# Patient Record
Sex: MALE | Race: WHITE | Employment: OTHER | URBAN - METROPOLITAN AREA
[De-identification: names, ages, dates, MRNs, and addresses within clinical notes are randomized per-mention and may not be internally consistent; named-entity substitution may affect disease eponyms.]

---

## 2017-06-14 ENCOUNTER — TELEPHONE (OUTPATIENT)
Dept: CARDIOLOGY CLINIC | Age: 67
End: 2017-06-14

## 2017-07-05 ENCOUNTER — PROCEDURE VISIT (OUTPATIENT)
Dept: CARDIOLOGY CLINIC | Age: 67
End: 2017-07-05

## 2017-07-05 VITALS — DIASTOLIC BLOOD PRESSURE: 86 MMHG | HEART RATE: 64 BPM | SYSTOLIC BLOOD PRESSURE: 138 MMHG

## 2017-07-05 DIAGNOSIS — Z95.810 ICD (IMPLANTABLE CARDIOVERTER-DEFIBRILLATOR), BIVENTRICULAR, IN SITU: Primary | ICD-10-CM

## 2017-07-05 PROCEDURE — 93284 PRGRMG EVAL IMPLANTABLE DFB: CPT | Performed by: INTERNAL MEDICINE

## 2017-07-05 PROCEDURE — 93290 INTERROG DEV EVAL ICPMS IP: CPT | Performed by: INTERNAL MEDICINE

## 2017-07-06 ENCOUNTER — TELEPHONE (OUTPATIENT)
Dept: CARDIOLOGY CLINIC | Age: 67
End: 2017-07-06

## 2017-07-07 ENCOUNTER — TELEPHONE (OUTPATIENT)
Dept: CARDIOLOGY CLINIC | Age: 67
End: 2017-07-07

## 2017-08-07 DIAGNOSIS — I48.20 CHRONIC ATRIAL FIBRILLATION (HCC): ICD-10-CM

## 2017-08-07 DIAGNOSIS — I48.91 LONE ATRIAL FIBRILLATION (HCC): ICD-10-CM

## 2017-08-07 DIAGNOSIS — I10 HTN (HYPERTENSION), BENIGN: ICD-10-CM

## 2017-08-07 RX ORDER — AMIODARONE HYDROCHLORIDE 200 MG/1
200 TABLET ORAL DAILY
Qty: 30 TABLET | Refills: 0 | Status: SHIPPED | OUTPATIENT
Start: 2017-08-07 | End: 2017-09-06 | Stop reason: SDUPTHER

## 2017-08-08 DIAGNOSIS — I48.91 LONE ATRIAL FIBRILLATION (HCC): ICD-10-CM

## 2017-08-08 DIAGNOSIS — I48.20 CHRONIC ATRIAL FIBRILLATION (HCC): ICD-10-CM

## 2017-08-08 DIAGNOSIS — I10 HTN (HYPERTENSION), BENIGN: ICD-10-CM

## 2017-08-08 RX ORDER — HYDRALAZINE HYDROCHLORIDE 50 MG/1
TABLET, FILM COATED ORAL
Qty: 90 TABLET | Refills: 0 | Status: SHIPPED | OUTPATIENT
Start: 2017-08-08 | End: 2017-11-01 | Stop reason: SDUPTHER

## 2017-08-10 DIAGNOSIS — I48.20 CHRONIC ATRIAL FIBRILLATION (HCC): ICD-10-CM

## 2017-08-10 DIAGNOSIS — I48.91 LONE ATRIAL FIBRILLATION (HCC): ICD-10-CM

## 2017-08-10 DIAGNOSIS — I10 HTN (HYPERTENSION), BENIGN: ICD-10-CM

## 2017-08-10 RX ORDER — AMIODARONE HYDROCHLORIDE 200 MG/1
200 TABLET ORAL DAILY
Qty: 30 TABLET | Refills: 0 | OUTPATIENT
Start: 2017-08-10

## 2017-08-16 ENCOUNTER — OFFICE VISIT (OUTPATIENT)
Dept: CARDIOLOGY CLINIC | Age: 67
End: 2017-08-16

## 2017-08-16 ENCOUNTER — TELEPHONE (OUTPATIENT)
Dept: CARDIOLOGY CLINIC | Age: 67
End: 2017-08-16

## 2017-08-16 VITALS
SYSTOLIC BLOOD PRESSURE: 138 MMHG | DIASTOLIC BLOOD PRESSURE: 88 MMHG | WEIGHT: 315 LBS | HEART RATE: 71 BPM | BODY MASS INDEX: 45.1 KG/M2 | HEIGHT: 70 IN

## 2017-08-16 DIAGNOSIS — I48.91 LONE ATRIAL FIBRILLATION (HCC): Primary | ICD-10-CM

## 2017-08-16 PROCEDURE — 93000 ELECTROCARDIOGRAM COMPLETE: CPT | Performed by: INTERNAL MEDICINE

## 2017-08-16 PROCEDURE — 99213 OFFICE O/P EST LOW 20 MIN: CPT | Performed by: INTERNAL MEDICINE

## 2017-08-16 RX ORDER — TRIAMTERENE AND HYDROCHLOROTHIAZIDE 37.5; 25 MG/1; MG/1
1 TABLET ORAL EVERY MORNING
COMMUNITY
End: 2020-02-04

## 2017-09-06 DIAGNOSIS — I48.20 CHRONIC ATRIAL FIBRILLATION (HCC): ICD-10-CM

## 2017-09-06 DIAGNOSIS — I48.91 LONE ATRIAL FIBRILLATION (HCC): ICD-10-CM

## 2017-09-06 DIAGNOSIS — I10 HTN (HYPERTENSION), BENIGN: ICD-10-CM

## 2017-09-07 RX ORDER — AMIODARONE HYDROCHLORIDE 200 MG/1
200 TABLET ORAL DAILY
Qty: 30 TABLET | Refills: 6 | Status: SHIPPED | OUTPATIENT
Start: 2017-09-07 | End: 2017-11-03 | Stop reason: ALTCHOICE

## 2017-09-15 ENCOUNTER — TELEPHONE (OUTPATIENT)
Dept: SURGERY | Age: 67
End: 2017-09-15

## 2017-09-25 ENCOUNTER — TELEPHONE (OUTPATIENT)
Dept: NON INVASIVE DIAGNOSTICS | Age: 67
End: 2017-09-25

## 2017-09-26 ENCOUNTER — PROCEDURE VISIT (OUTPATIENT)
Dept: CARDIOLOGY CLINIC | Age: 67
End: 2017-09-26

## 2017-09-26 VITALS — DIASTOLIC BLOOD PRESSURE: 84 MMHG | HEART RATE: 64 BPM | SYSTOLIC BLOOD PRESSURE: 132 MMHG

## 2017-09-26 DIAGNOSIS — Z95.810 ICD (IMPLANTABLE CARDIOVERTER-DEFIBRILLATOR), BIVENTRICULAR, IN SITU: Primary | ICD-10-CM

## 2017-09-26 PROCEDURE — 93284 PRGRMG EVAL IMPLANTABLE DFB: CPT | Performed by: INTERNAL MEDICINE

## 2017-09-28 ENCOUNTER — OFFICE VISIT (OUTPATIENT)
Dept: SURGERY | Age: 67
End: 2017-09-28

## 2017-09-28 VITALS
HEIGHT: 72 IN | HEART RATE: 81 BPM | SYSTOLIC BLOOD PRESSURE: 139 MMHG | BODY MASS INDEX: 42.66 KG/M2 | DIASTOLIC BLOOD PRESSURE: 77 MMHG | WEIGHT: 315 LBS

## 2017-09-28 DIAGNOSIS — K40.90 INGUINAL HERNIA OF RIGHT SIDE WITHOUT OBSTRUCTION OR GANGRENE: Primary | ICD-10-CM

## 2017-09-28 PROCEDURE — 99204 OFFICE O/P NEW MOD 45 MIN: CPT | Performed by: SURGERY

## 2017-09-28 ASSESSMENT — ENCOUNTER SYMPTOMS
COLOR CHANGE: 0
SORE THROAT: 0
RECTAL PAIN: 0
BACK PAIN: 0
CONSTIPATION: 0
EYE REDNESS: 0
EYE ITCHING: 0
ANAL BLEEDING: 0
PHOTOPHOBIA: 0
CHOKING: 0
STRIDOR: 0
APNEA: 0
ABDOMINAL PAIN: 1

## 2017-10-06 ENCOUNTER — TELEPHONE (OUTPATIENT)
Dept: SURGERY | Age: 67
End: 2017-10-06

## 2017-10-11 ENCOUNTER — HOSPITAL ENCOUNTER (OUTPATIENT)
Dept: CT IMAGING | Age: 67
Discharge: HOME | End: 2017-10-11
Attending: SURGERY | Admitting: SURGERY

## 2017-10-11 DIAGNOSIS — K40.90 UNILATERAL INGUINAL HERNIA WITHOUT OBSTRUCTION OR GANGRENE, RECURRENCE NOT SPECIFIED: ICD-10-CM

## 2017-10-11 DIAGNOSIS — K40.90 UNILATERAL INGUINAL HERNIA WITHOUT OBSTRUCTION OR GANGRENE: ICD-10-CM

## 2017-10-12 ENCOUNTER — OFFICE VISIT (OUTPATIENT)
Dept: SURGERY | Age: 67
End: 2017-10-12

## 2017-10-12 VITALS
HEART RATE: 71 BPM | BODY MASS INDEX: 42.66 KG/M2 | SYSTOLIC BLOOD PRESSURE: 138 MMHG | DIASTOLIC BLOOD PRESSURE: 70 MMHG | HEIGHT: 72 IN | WEIGHT: 315 LBS

## 2017-10-12 DIAGNOSIS — K40.90 INGUINAL HERNIA OF RIGHT SIDE WITHOUT OBSTRUCTION OR GANGRENE: Primary | ICD-10-CM

## 2017-10-12 PROCEDURE — 99214 OFFICE O/P EST MOD 30 MIN: CPT | Performed by: SURGERY

## 2017-10-12 ASSESSMENT — ENCOUNTER SYMPTOMS
ANAL BLEEDING: 0
EYE REDNESS: 0
EYE ITCHING: 0
BACK PAIN: 0
SORE THROAT: 0
CHOKING: 0
STRIDOR: 0
PHOTOPHOBIA: 0
RECTAL PAIN: 0
APNEA: 0
COLOR CHANGE: 0
CONSTIPATION: 0

## 2017-10-13 NOTE — PROGRESS NOTES
no rebound and no guarding. A hernia is present. Hernia confirmed positive in the right inguinal area. Musculoskeletal: Normal range of motion. Neurological: He is alert and oriented to person, place, and time. Skin: Skin is warm. Psychiatric: He has a normal mood and affect. ASSESSMENT:  1. Inguinal hernia of right side without obstruction or gangrene          PLAN:  Treatment:  I will proceed with robotic-assisted right inguinal hernia repair. Patient acknowledges the placement of mesh in order to reduce recurrence. .  Patient counseled on risks, benefits, and alternatives of treatment plan at length. Patient states an understanding and willingness to proceed with plan. No orders of the defined types were placed in this encounter. No orders of the defined types were placed in this encounter. Follow Up:  Return in about 2 weeks (around 10/26/2017).       Gaby Good MD

## 2017-10-16 ENCOUNTER — PROCEDURE VISIT (OUTPATIENT)
Dept: CARDIOLOGY CLINIC | Age: 67
End: 2017-10-16

## 2017-10-16 ENCOUNTER — HOSPITAL ENCOUNTER (OUTPATIENT)
Dept: PULMONOLOGY | Age: 67
Discharge: HOME | End: 2017-10-16
Attending: INTERNAL MEDICINE | Admitting: INTERNAL MEDICINE

## 2017-10-16 DIAGNOSIS — Z92.29 PERSONAL HISTORY OF OTHER DRUG THERAPY (CODE): ICD-10-CM

## 2017-10-16 DIAGNOSIS — Z92.29 PERSONAL HISTORY OF OTHER DRUG THERAPY: ICD-10-CM

## 2017-10-16 DIAGNOSIS — Z95.810 ICD (IMPLANTABLE CARDIOVERTER-DEFIBRILLATOR), BIVENTRICULAR, IN SITU: Primary | ICD-10-CM

## 2017-10-16 PROCEDURE — 93297 REM INTERROG DEV EVAL ICPMS: CPT | Performed by: INTERNAL MEDICINE

## 2017-10-16 PROCEDURE — 93296 REM INTERROG EVL PM/IDS: CPT | Performed by: INTERNAL MEDICINE

## 2017-10-16 PROCEDURE — 93295 DEV INTERROG REMOTE 1/2/MLT: CPT | Performed by: INTERNAL MEDICINE

## 2017-10-17 ENCOUNTER — TELEPHONE (OUTPATIENT)
Dept: SURGERY | Age: 67
End: 2017-10-17

## 2017-10-17 NOTE — TELEPHONE ENCOUNTER
Spoke to Penelope regarding his surgery (robotic right inguinal hernia repair) scheduled @ [facility]. Notified of dates, times and location. PAT - 11/6/17 @ 9  SURGERY - 11/10/17 @ 10  P/O - 11/20/17 @ 900    NPO AFTER MIDNIGHT  HOLD ELIQUIS 5 DAYS PRIOR (FAXED OVER CC REQUEST TO DR. Federico Joya)  OKAY TO CONT.  81MG ASA   SENT

## 2017-10-25 ENCOUNTER — TELEPHONE (OUTPATIENT)
Dept: CARDIOLOGY CLINIC | Age: 67
End: 2017-10-25

## 2017-10-25 NOTE — TELEPHONE ENCOUNTER
Cardiac Risk assessment letter faxed to Dr. Lilian Lizama for (R) Inguinal Hernia, Robotic (L) Inguinal Hernia Repair surgery.   [phone]

## 2017-10-26 ENCOUNTER — TELEPHONE (OUTPATIENT)
Dept: CARDIOLOGY CLINIC | Age: 67
End: 2017-10-26

## 2017-10-26 DIAGNOSIS — R94.2 ABNORMAL PFTS: Primary | ICD-10-CM

## 2017-10-26 NOTE — TELEPHONE ENCOUNTER
Patient advised of abnormal PFT and that Dr. Candace Stanton wants him to discontinue Amiodarone. Per Dr. Doron Burgess, patient should follow up with Pulmonologist.  Patient advised and voiced understanding. Referral in [facility] and sent to Dr. Lovely Alonzo.

## 2017-11-01 DIAGNOSIS — I48.91 LONE ATRIAL FIBRILLATION (HCC): ICD-10-CM

## 2017-11-01 DIAGNOSIS — I10 HTN (HYPERTENSION), BENIGN: ICD-10-CM

## 2017-11-01 DIAGNOSIS — I48.20 CHRONIC ATRIAL FIBRILLATION (HCC): ICD-10-CM

## 2017-11-01 NOTE — TELEPHONE ENCOUNTER
Patient called needs 90 day supply sent to [address] [place] [address] & [address] [facility]. Advised 24 hours.

## 2017-11-02 RX ORDER — HYDRALAZINE HYDROCHLORIDE 50 MG/1
50 TABLET, FILM COATED ORAL 3 TIMES DAILY
Qty: 90 TABLET | Refills: 3 | Status: SHIPPED | OUTPATIENT
Start: 2017-11-02 | End: 2018-03-01 | Stop reason: SDUPTHER

## 2017-11-02 NOTE — ANESTHESIA PRE-OP
Pre-Admission Testing   Pre-Procedural Screening   NP Note    PMH:  Past Medical History:   Diagnosis Date    Atrial fibrillation ([address]     CAD (coronary artery disease)     Cardiac pacemaker 2/19/2014    BIV ICD Serial # BL [id number] Moderl # [id number]    Cardiomyopathy ([address]     CHF (congestive heart failure) ([address]     Hematoma - postoperative 2/19/2014    Pseudo aneurism post op at [address] groin    History of cardiac cath 12/4/2013 12/13 EF30% mild LAD, CX     History of cardiovascular disorder 12/4/2013    EF 29%, mild ischemia RCA    History of cardioversion 12/6/2013 12/13 unsuccessful    History of cardioversion 5/18/15    DCCV-.  History of echocardiogram 12/4/2013 12/13 EF 30-35%, mild dilated LV, severe LV syst funct, mild MR, TR     History of transesophageal echocardiography (KVNG) for monitoring 12/4/2013 12/13 no clots    Hyperlipidemia     Hypertelorism     Hypertension     Obesity     S/P ablation of atrial fibrillation 2/14/2014    for a-fib by dr. Renny Chandra S/P cardiac cath 2/5/14       PSH:    Past Surgical History:   Procedure Laterality Date    ATRIAL ABLATION SURGERY  2/21/2014    for a-fib    CARDIAC DEFIBRILLATOR PLACEMENT  2/19/14    Medtronic BiV ICD [facility]    CARDIOVERSION  05/18/2015    KVNG/Cardioversion    KNEE ARTHROSCOPY          Current Medications:   Not in a hospital admission. Allergies:   No Known Allergies          Social History:  Social History     Social History    Marital status:      Spouse name: N/A    Number of children: N/A    Years of education: N/A     Occupational History    Not on file. Social History Main Topics    Smoking status: Former Smoker     Packs/day: 1.00     Years: 4.00     Types: Cigarettes, Cigars     Start date: 7     Quit date: 1974    Smokeless tobacco: Never Used    Alcohol use 0.0 oz/week      Comment: \"Occ.  Maybe Once A Week\"    Drug use:     Sexual activity: Not Currently     Other Topics II  Teeth:  Missing upper and lower wisdom teeth        LUNGS:  No increased work of breathing, good air exchange, clear to auscultation bilaterally, no crackles or wheezing  CARDIOVASCULAR:  Normal apical impulse, regular rate and rhythm, normal S1 and S2, no S3 or S4, and no murmur noted    Testing Results:    EKG:  A-V paced  11/6/2017    LABS:    CBC  Lab Results   Component Value Date/Time    WBC 14.7 (H) 11/06/2017 12:40 PM    HGB 14.6 11/06/2017 12:40 PM    HCT 44.5 11/06/2017 12:40 PM     11/06/2017 12:40 PM     RENAL  Lab Results   Component Value Date/Time     11/06/2017 12:40 PM    K 5.0 11/06/2017 12:40 PM    CL 99 11/06/2017 12:40 PM    CO2 27 11/06/2017 12:40 PM    BUN 29 (H) 11/06/2017 12:40 PM    CREATININE 1.5 (H) 11/06/2017 12:40 PM    GLUCOSE 100 11/06/2017 12:40 PM       Summary:  Chart reviewed, pt. Interviewed and examined. Planned surgical procedure:  Right inguinal hernia per Dr. Gini Sandhoff    1. Cardiac risk assessment per Dr. Cintia Tolentino. Considered a medium risk candidate for any joslyn-operative cardiac complications. ICD/Pacer interrogation: 10/15/2017  Medtronic DDD mode, no events. AP- 97%  Pacer dependent. Battery life: 4.2 years. hx HTN, HLD, CMP, s/p ICD pacer implanted 2/2014. AF s/p cardioversion 2013, 2015. S/p ablation, 2014. On beta blocker, ASA, and Eliquis, last dose 11/5/2017. Cardiac cath @ [address] [place] [address], 2014 with post cath pseudoaneurysm, hematoma with transfusion. 2.  ANTOINE, wears CPAP. Instructed to bring machine DOS. 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 Has NL PFT's. Remote smoking hx 1970's. Able to lie flat. PFT's 9/2016:  Pre FEV1 2.88L, Post FEV1 3.09L, 82% of pred. 3.  Denies reflux. Not on PPI. 4.  BUN: 29, CR: 1.5 previous baseline was 1.3. WBC: 14.7. Labs to Dr. Gini Sandhoff via [employer] secure text. 6.  Right inguinal hernia.      Anesthesia Evaluation will follow by a certified practitioner prior to surgery.     Electronically signed by Kevin Escamilla NP on 11/2/2017 at 1:52 PM

## 2017-11-03 ENCOUNTER — INITIAL CONSULT (OUTPATIENT)
Dept: PULMONOLOGY | Age: 67
End: 2017-11-03

## 2017-11-03 VITALS
HEIGHT: 72 IN | HEART RATE: 71 BPM | OXYGEN SATURATION: 96 % | BODY MASS INDEX: 42.66 KG/M2 | WEIGHT: 315 LBS | SYSTOLIC BLOOD PRESSURE: 120 MMHG | DIASTOLIC BLOOD PRESSURE: 72 MMHG | RESPIRATION RATE: 20 BRPM

## 2017-11-03 DIAGNOSIS — I48.91 ATRIAL FIBRILLATION, UNSPECIFIED TYPE (HCC): ICD-10-CM

## 2017-11-03 DIAGNOSIS — J41.0 SIMPLE CHRONIC BRONCHITIS (HCC): Primary | ICD-10-CM

## 2017-11-03 DIAGNOSIS — G47.33 OBSTRUCTIVE SLEEP APNEA: ICD-10-CM

## 2017-11-03 PROBLEM — J44.9 COPD (CHRONIC OBSTRUCTIVE PULMONARY DISEASE) (HCC): Status: ACTIVE | Noted: 2017-11-03

## 2017-11-03 PROCEDURE — 99204 OFFICE O/P NEW MOD 45 MIN: CPT | Performed by: INTERNAL MEDICINE

## 2017-11-03 NOTE — PROGRESS NOTES
Subjective:   CHIEF COMPLAINT / HPI: Lauren Quiroga is a 19-year-old male referred here for evaluation of abnormal pulmonary function testing. He had this PFT testing recently at [facility] to follow-up on his long-term amiodarone exposure. He denies a past history of chronic lung disease or asthma but does periodically hear himself wheeze and states that he was a cigarette smoker for about 3 years as a young adult and has had secondary smoke exposure. He is never been formally diagnosed with COPD or asthma in the past.  He denies chest pain or chest discomfort. He has a long history of chronic Atrial fibrillation and his had cardioversion done and has an implanted defibrillator. He also carries a history of cardiomyopathy. About 4 years ago he was diagnosed with obstructive sleep apnea and is on CPAP nightly and is compliant with its use and has had a good clinical response with less daytime sleepiness and improved daytime energy     He presently is being evaluated for a right inguinal hernia and is to have surgery by Dr. Hollie Chandra in the near future    Past Medical History:  Past Medical History:   Diagnosis Date    Atrial fibrillation ([address]     CAD (coronary artery disease)     Cardiac pacemaker 2/19/2014    BIV ICD Serial # BL [id number] Moderl # [id number]    Cardiomyopathy ([address]     CHF (congestive heart failure) ([address]     COPD (chronic obstructive pulmonary disease) ([facility] 11/3/2017    Hematoma - postoperative 2/19/2014    Pseudo aneurism post op at [address] groin    History of cardiac cath 12/4/2013 12/13 EF30% mild LAD, CX     History of cardiovascular disorder 12/4/2013    EF 29%, mild ischemia RCA    History of cardioversion 12/6/2013 12/13 unsuccessful    History of cardioversion 5/18/15    DCCV-.      History of echocardiogram 12/4/2013 12/13 EF 30-35%, mild dilated LV, severe LV syst funct, mild MR, TR     History of transesophageal echocardiography (KVNG) for monitoring 12/4/2013 Height: 6' (1.829 m)         CONSTITUTIONAL:  awake, alert, cooperative, no apparent distress, and appears stated age, Moderately obese  NECK:  Supple and nontender,  trachea midline, no adenopathy, thyroid nl, no JVD, no wheezing or stridor over neck, Increased neck girth  CHEST: Chest expansion equal and symmetrical, no intercostal retraction, no increased work of breathing  LUNGS: Breath sounds are clear throughout all areas. I hear no wheezing or rhonchi   CARDIOVASCULAR: Normal S1 and S2 , no murmurs or gallops ,no pericardial rubs  ABDOMEN:  normal bowel sounds, non-distended and no masses palpated, has right inguinal hernia. No hepatospleenomegaly  LYMPHADENOPATHY:  no axillary or supraclavicular adenopathy. No cervical adnenopathy  RIGHT AND LEFT LOWER EXTREMITIES: No edema, no inflammation, no tenderness. RADIOLOGY: CT abdomen 10/11/17  FINDINGS:   Lower Chest: Calcified granuloma in the left lung base.  Lung bases are   otherwise clear. I specifically do not see any evidence of interstitial lung disease or pulmonary fibrosis      PFT: Spirometry done at [facility] on 10/18/17 demonstrated a minimal tomild obstructive defect with a significant response to bronchodilators in his small airways. His total lung capacity was normal which rules out a restrictive lung process which is typically seen with Cordarone-induced pulmonary toxicity    Assessment:     1. Simple chronic bronchitis ([address]    2. Obstructive sleep apnea    3. Atrial fibrillation, unspecified type ([address]        Plan:   I suspect he does have mild but asymptomatic COPD. This can be followed by serial pulmonary function testing in the future. No specific therapy is required this time. I do not think he has any definite evidence of Cordarone-induced pulmonary toxicity. I will get a baseline chest x-ray but will hold off on CT chest scanning at this time. He is okay for his upcoming surgery.   Return in about 6 months (around 5/3/2018) for Recheck for COPD, Recheck for Obstructive Sleep Apnea. This dictation was performed with a verbal recognition program and it was checked for errors. It is possible that there are still dictated errors within this office note. Any errors should be brought immediately to my attention for correction. All efforts were made to ensure that this office note is accurate.

## 2017-11-06 ENCOUNTER — HOSPITAL ENCOUNTER (OUTPATIENT)
Dept: PREADMISSION TESTING | Age: 67
Discharge: HOME | End: 2017-11-06
Attending: SURGERY | Admitting: SURGERY

## 2017-11-06 VITALS
TEMPERATURE: 97.5 F | HEART RATE: 72 BPM | BODY MASS INDEX: 45.1 KG/M2 | HEIGHT: 69 IN | DIASTOLIC BLOOD PRESSURE: 62 MMHG | WEIGHT: 304.5 LBS | SYSTOLIC BLOOD PRESSURE: 131 MMHG | RESPIRATION RATE: 16 BRPM | OXYGEN SATURATION: 96 %

## 2017-11-06 DIAGNOSIS — Z92.29 PERSONAL HISTORY OF OTHER DRUG THERAPY (CODE): ICD-10-CM

## 2017-11-06 LAB
ANION GAP SERPL CALCULATED.3IONS-SCNC: 13 MMOL/L (ref 4–16)
BUN BLDV-MCNC: 29 MG/DL (ref 6–23)
CALCIUM SERPL-MCNC: 9.9 MG/DL (ref 8.3–10.6)
CHLORIDE BLD-SCNC: 99 MMOL/L (ref 99–110)
CO2: 27 MMOL/L (ref 21–32)
CREAT SERPL-MCNC: 1.5 MG/DL (ref 0.9–1.3)
GFR AFRICAN AMERICAN: 57 ML/MIN/1.73M2
GFR NON-AFRICAN AMERICAN: 47 ML/MIN/1.73M2
GLUCOSE BLD-MCNC: 100 MG/DL (ref 70–140)
HCT VFR BLD CALC: 44.5 % (ref 42–52)
HEMOGLOBIN: 14.6 GM/DL (ref 13.5–18)
MCH RBC QN AUTO: 29.8 PG (ref 27–31)
MCHC RBC AUTO-ENTMCNC: 32.8 % (ref 32–36)
MCV RBC AUTO: 90.8 FL (ref 78–100)
PDW BLD-RTO: 13.4 % (ref 11.7–14.9)
PLATELET # BLD: 305 K/CU MM (ref 140–440)
PMV BLD AUTO: 11 FL (ref 7.5–11.1)
POTASSIUM SERPL-SCNC: 5 MMOL/L (ref 3.5–5.1)
RBC # BLD: 4.9 M/CU MM (ref 4.6–6.2)
SODIUM BLD-SCNC: 139 MMOL/L (ref 135–145)
WBC # BLD: 14.7 K/CU MM (ref 4–10.5)

## 2017-11-06 RX ORDER — ZOLPIDEM TARTRATE 10 MG/1
TABLET ORAL NIGHTLY
COMMUNITY
End: 2020-07-27 | Stop reason: ALTCHOICE

## 2017-11-07 ENCOUNTER — HOSPITAL ENCOUNTER (OUTPATIENT)
Dept: GENERAL RADIOLOGY | Age: 67
Discharge: HOME | End: 2017-11-07
Attending: INTERNAL MEDICINE | Admitting: INTERNAL MEDICINE

## 2017-11-07 DIAGNOSIS — I48.91 ATRIAL FIBRILLATION, UNSPECIFIED TYPE (HCC): ICD-10-CM

## 2017-11-07 DIAGNOSIS — J41.0 SIMPLE CHRONIC BRONCHITIS (HCC): ICD-10-CM

## 2017-11-07 LAB
EKG ATRIAL RATE: 78 BPM
EKG DIAGNOSIS: NORMAL
EKG Q-T INTERVAL: 390 MS
EKG QRS DURATION: 122 MS
EKG QTC CALCULATION (BAZETT): 447 MS
EKG R AXIS: 248 DEGREES
EKG T AXIS: 41 DEGREES
EKG VENTRICULAR RATE: 79 BPM

## 2017-11-10 PROBLEM — K40.90 RIGHT INGUINAL HERNIA: Status: ACTIVE | Noted: 2017-11-10

## 2017-11-20 ENCOUNTER — OFFICE VISIT (OUTPATIENT)
Dept: SURGERY | Age: 67
End: 2017-11-20

## 2017-11-20 VITALS
HEIGHT: 69 IN | DIASTOLIC BLOOD PRESSURE: 78 MMHG | WEIGHT: 304.01 LBS | BODY MASS INDEX: 45.03 KG/M2 | HEART RATE: 80 BPM | SYSTOLIC BLOOD PRESSURE: 131 MMHG

## 2017-11-20 DIAGNOSIS — K40.90 NON-RECURRENT UNILATERAL INGUINAL HERNIA WITHOUT OBSTRUCTION OR GANGRENE: Primary | ICD-10-CM

## 2017-11-20 PROCEDURE — 99024 POSTOP FOLLOW-UP VISIT: CPT | Performed by: SURGERY

## 2017-11-20 NOTE — PROGRESS NOTES
Chief Complaint   Patient presents with    Post-Op Check     c/c-post op check- [facility] 11/10/17         SUBJECTIVE:  Patient here for post op visit s/p Robotic-Assisted right JUAN inguinal hernia repair with mesh, 11/10/17 . This is 1st visit. Pain is minimal.  Wounds: min bruising and no discharge. BM regular some constipation, used colace once. OBJECTIVE:  Physical Exam   Constitutional: He is oriented to person, place, and time. He appears well-developed and well-nourished. No distress. Abdominal: Soft. Bowel sounds are normal. He exhibits no distension and no mass. There is no tenderness. There is no rebound and no guarding. Neurological: He is alert and oriented to person, place, and time. He has normal reflexes. He displays normal reflexes. No cranial nerve deficit. He exhibits normal muscle tone. Coordination normal.   Skin: Skin is warm and dry. No rash noted. He is not diaphoretic. No erythema. No pallor. Wound well healed without signs of active infection. Suture line intact. Abdomen soft, nontender, nondistended. ASSESSMENT:  Patient doing well on this post operative check. Wounds well healed. 1. Non-recurrent unilateral inguinal hernia without obstruction or gangrene        PLAN:  Continue same  Increase activity as tolerated  Take colace PRN for constipation        No orders of the defined types were placed in this encounter. No orders of the defined types were placed in this encounter. Follow Up: Return in about 2 weeks (around 12/4/2017).   Cindy Montelongo PA-C

## 2017-12-04 ENCOUNTER — OFFICE VISIT (OUTPATIENT)
Dept: SURGERY | Age: 67
End: 2017-12-04

## 2017-12-04 VITALS
HEART RATE: 71 BPM | SYSTOLIC BLOOD PRESSURE: 138 MMHG | WEIGHT: 290 LBS | HEIGHT: 69 IN | BODY MASS INDEX: 42.95 KG/M2 | DIASTOLIC BLOOD PRESSURE: 67 MMHG

## 2017-12-04 DIAGNOSIS — K40.90 NON-RECURRENT UNILATERAL INGUINAL HERNIA WITHOUT OBSTRUCTION OR GANGRENE: Primary | ICD-10-CM

## 2017-12-04 PROCEDURE — 99024 POSTOP FOLLOW-UP VISIT: CPT | Performed by: SURGERY

## 2017-12-04 NOTE — PROGRESS NOTES
Chief Complaint   Patient presents with    Post-Op Check     c/c-post op check- sugar [facility] 11/10/17         SUBJECTIVE:  Patient here for post op visit s/p Robotic-Assisted right JUAN inguinal hernia repair with mesh, 11/10/17. This is 2nd visit. Pain is 0/10  Wounds: min bruising and no discharge. BM some constipation     OBJECTIVE:  Physical Exam    Wound well healed without signs of active infection. Suture line intact. Abdomen soft, nontender, nondistended. Path reveals:       ASSESSMENT:  Patient doing well on this post operative check. Wounds well healed. 1. Non-recurrent unilateral inguinal hernia without obstruction or gangrene        PLAN:    Continue same  Increase activity as tolerated        No orders of the defined types were placed in this encounter. No orders of the defined types were placed in this encounter. Follow Up: Return if symptoms worsen or fail to improve.     Juan Carlos Coe MD

## 2018-01-22 ENCOUNTER — PROCEDURE VISIT (OUTPATIENT)
Dept: CARDIOLOGY CLINIC | Age: 68
End: 2018-01-22

## 2018-01-22 DIAGNOSIS — Z95.810 ICD (IMPLANTABLE CARDIOVERTER-DEFIBRILLATOR), BIVENTRICULAR, IN SITU: Primary | ICD-10-CM

## 2018-01-22 PROCEDURE — 93296 REM INTERROG EVL PM/IDS: CPT | Performed by: INTERNAL MEDICINE

## 2018-01-22 PROCEDURE — 93297 REM INTERROG DEV EVAL ICPMS: CPT | Performed by: INTERNAL MEDICINE

## 2018-01-22 PROCEDURE — 93295 DEV INTERROG REMOTE 1/2/MLT: CPT | Performed by: INTERNAL MEDICINE

## 2018-03-01 DIAGNOSIS — I48.91 LONE ATRIAL FIBRILLATION (HCC): ICD-10-CM

## 2018-03-01 DIAGNOSIS — I10 HTN (HYPERTENSION), BENIGN: ICD-10-CM

## 2018-03-01 DIAGNOSIS — I48.20 CHRONIC ATRIAL FIBRILLATION (HCC): ICD-10-CM

## 2018-03-01 RX ORDER — HYDRALAZINE HYDROCHLORIDE 50 MG/1
50 TABLET, FILM COATED ORAL 3 TIMES DAILY
Qty: 270 TABLET | Refills: 3 | Status: SHIPPED | OUTPATIENT
Start: 2018-03-01 | End: 2018-08-20 | Stop reason: SDUPTHER

## 2018-04-30 ENCOUNTER — PROCEDURE VISIT (OUTPATIENT)
Dept: CARDIOLOGY CLINIC | Age: 68
End: 2018-04-30

## 2018-04-30 DIAGNOSIS — Z95.810 ICD (IMPLANTABLE CARDIOVERTER-DEFIBRILLATOR), BIVENTRICULAR, IN SITU: Primary | ICD-10-CM

## 2018-04-30 PROCEDURE — 93295 DEV INTERROG REMOTE 1/2/MLT: CPT | Performed by: INTERNAL MEDICINE

## 2018-04-30 PROCEDURE — 93296 REM INTERROG EVL PM/IDS: CPT | Performed by: INTERNAL MEDICINE

## 2018-04-30 PROCEDURE — 93297 REM INTERROG DEV EVAL ICPMS: CPT | Performed by: INTERNAL MEDICINE

## 2018-05-03 ENCOUNTER — OFFICE VISIT (OUTPATIENT)
Dept: PULMONOLOGY | Age: 68
End: 2018-05-03

## 2018-05-03 VITALS
WEIGHT: 269 LBS | HEART RATE: 67 BPM | SYSTOLIC BLOOD PRESSURE: 124 MMHG | DIASTOLIC BLOOD PRESSURE: 78 MMHG | HEIGHT: 72 IN | BODY MASS INDEX: 36.44 KG/M2 | OXYGEN SATURATION: 94 %

## 2018-05-03 DIAGNOSIS — G47.33 OBSTRUCTIVE SLEEP APNEA: ICD-10-CM

## 2018-05-03 DIAGNOSIS — J42 CHRONIC BRONCHITIS, UNSPECIFIED CHRONIC BRONCHITIS TYPE (HCC): Primary | ICD-10-CM

## 2018-05-03 PROCEDURE — G8417 CALC BMI ABV UP PARAM F/U: HCPCS | Performed by: INTERNAL MEDICINE

## 2018-05-03 PROCEDURE — 99213 OFFICE O/P EST LOW 20 MIN: CPT | Performed by: INTERNAL MEDICINE

## 2018-05-03 PROCEDURE — 1036F TOBACCO NON-USER: CPT | Performed by: INTERNAL MEDICINE

## 2018-05-03 PROCEDURE — G8926 SPIRO NO PERF OR DOC: HCPCS | Performed by: INTERNAL MEDICINE

## 2018-05-03 PROCEDURE — 3023F SPIROM DOC REV: CPT | Performed by: INTERNAL MEDICINE

## 2018-05-03 PROCEDURE — G8427 DOCREV CUR MEDS BY ELIG CLIN: HCPCS | Performed by: INTERNAL MEDICINE

## 2018-05-03 PROCEDURE — 1123F ACP DISCUSS/DSCN MKR DOCD: CPT | Performed by: INTERNAL MEDICINE

## 2018-05-03 PROCEDURE — 3017F COLORECTAL CA SCREEN DOC REV: CPT | Performed by: INTERNAL MEDICINE

## 2018-05-03 PROCEDURE — 4040F PNEUMOC VAC/ADMIN/RCVD: CPT | Performed by: INTERNAL MEDICINE

## 2018-05-03 RX ORDER — ALLOPURINOL 300 MG/1
300 TABLET ORAL DAILY
COMMUNITY

## 2018-05-08 ENCOUNTER — TELEPHONE (OUTPATIENT)
Dept: CARDIOLOGY CLINIC | Age: 68
End: 2018-05-08

## 2018-05-25 ENCOUNTER — OFFICE VISIT (OUTPATIENT)
Dept: CARDIOLOGY CLINIC | Age: 68
End: 2018-05-25

## 2018-05-25 VITALS
WEIGHT: 262.8 LBS | DIASTOLIC BLOOD PRESSURE: 78 MMHG | HEART RATE: 64 BPM | BODY MASS INDEX: 35.59 KG/M2 | OXYGEN SATURATION: 98 % | HEIGHT: 72 IN | SYSTOLIC BLOOD PRESSURE: 126 MMHG

## 2018-05-25 DIAGNOSIS — I48.19 PERSISTENT ATRIAL FIBRILLATION (HCC): Primary | ICD-10-CM

## 2018-05-25 PROCEDURE — G8417 CALC BMI ABV UP PARAM F/U: HCPCS | Performed by: INTERNAL MEDICINE

## 2018-05-25 PROCEDURE — 1123F ACP DISCUSS/DSCN MKR DOCD: CPT | Performed by: INTERNAL MEDICINE

## 2018-05-25 PROCEDURE — 1036F TOBACCO NON-USER: CPT | Performed by: INTERNAL MEDICINE

## 2018-05-25 PROCEDURE — G8427 DOCREV CUR MEDS BY ELIG CLIN: HCPCS | Performed by: INTERNAL MEDICINE

## 2018-05-25 PROCEDURE — 4040F PNEUMOC VAC/ADMIN/RCVD: CPT | Performed by: INTERNAL MEDICINE

## 2018-05-25 PROCEDURE — 99213 OFFICE O/P EST LOW 20 MIN: CPT | Performed by: INTERNAL MEDICINE

## 2018-05-25 PROCEDURE — 3017F COLORECTAL CA SCREEN DOC REV: CPT | Performed by: INTERNAL MEDICINE

## 2018-05-29 ENCOUNTER — TELEPHONE (OUTPATIENT)
Dept: CARDIOLOGY CLINIC | Age: 68
End: 2018-05-29

## 2018-06-05 PROBLEM — Z51.81 VISIT FOR MONITORING TIKOSYN THERAPY: Status: ACTIVE | Noted: 2018-06-05

## 2018-06-05 PROBLEM — Z79.899 VISIT FOR MONITORING TIKOSYN THERAPY: Status: ACTIVE | Noted: 2018-06-05

## 2018-06-08 ENCOUNTER — TELEPHONE (OUTPATIENT)
Dept: CARDIOLOGY CLINIC | Age: 68
End: 2018-06-08

## 2018-06-15 ENCOUNTER — OFFICE VISIT (OUTPATIENT)
Dept: CARDIOLOGY CLINIC | Age: 68
End: 2018-06-15

## 2018-06-15 VITALS
HEART RATE: 84 BPM | DIASTOLIC BLOOD PRESSURE: 71 MMHG | WEIGHT: 258.6 LBS | BODY MASS INDEX: 35.03 KG/M2 | SYSTOLIC BLOOD PRESSURE: 112 MMHG | OXYGEN SATURATION: 97 % | HEIGHT: 72 IN

## 2018-06-15 DIAGNOSIS — Z51.81 VISIT FOR MONITORING TIKOSYN THERAPY: Primary | ICD-10-CM

## 2018-06-15 DIAGNOSIS — Z79.899 VISIT FOR MONITORING TIKOSYN THERAPY: Primary | ICD-10-CM

## 2018-06-15 DIAGNOSIS — I48.91 ATRIAL FIBRILLATION, UNSPECIFIED TYPE (HCC): ICD-10-CM

## 2018-06-15 PROCEDURE — 99212 OFFICE O/P EST SF 10 MIN: CPT | Performed by: INTERNAL MEDICINE

## 2018-06-15 PROCEDURE — G8427 DOCREV CUR MEDS BY ELIG CLIN: HCPCS | Performed by: INTERNAL MEDICINE

## 2018-06-15 PROCEDURE — 93000 ELECTROCARDIOGRAM COMPLETE: CPT | Performed by: INTERNAL MEDICINE

## 2018-06-15 PROCEDURE — 3017F COLORECTAL CA SCREEN DOC REV: CPT | Performed by: INTERNAL MEDICINE

## 2018-06-15 PROCEDURE — 1111F DSCHRG MED/CURRENT MED MERGE: CPT | Performed by: INTERNAL MEDICINE

## 2018-06-15 PROCEDURE — 4040F PNEUMOC VAC/ADMIN/RCVD: CPT | Performed by: INTERNAL MEDICINE

## 2018-06-15 PROCEDURE — G8417 CALC BMI ABV UP PARAM F/U: HCPCS | Performed by: INTERNAL MEDICINE

## 2018-06-15 PROCEDURE — 1036F TOBACCO NON-USER: CPT | Performed by: INTERNAL MEDICINE

## 2018-06-15 PROCEDURE — 1123F ACP DISCUSS/DSCN MKR DOCD: CPT | Performed by: INTERNAL MEDICINE

## 2018-08-06 ENCOUNTER — TELEPHONE (OUTPATIENT)
Dept: CARDIOLOGY CLINIC | Age: 68
End: 2018-08-06

## 2018-08-06 ENCOUNTER — PROCEDURE VISIT (OUTPATIENT)
Dept: CARDIOLOGY CLINIC | Age: 68
End: 2018-08-06

## 2018-08-06 DIAGNOSIS — Z95.810 ICD (IMPLANTABLE CARDIOVERTER-DEFIBRILLATOR), BIVENTRICULAR, IN SITU: Primary | ICD-10-CM

## 2018-08-06 PROCEDURE — 93297 REM INTERROG DEV EVAL ICPMS: CPT | Performed by: INTERNAL MEDICINE

## 2018-08-06 PROCEDURE — 93295 DEV INTERROG REMOTE 1/2/MLT: CPT | Performed by: INTERNAL MEDICINE

## 2018-08-06 PROCEDURE — 93296 REM INTERROG EVL PM/IDS: CPT | Performed by: INTERNAL MEDICINE

## 2018-08-16 ENCOUNTER — TELEPHONE (OUTPATIENT)
Dept: CARDIOLOGY CLINIC | Age: 68
End: 2018-08-16

## 2018-08-20 DIAGNOSIS — I48.91 LONE ATRIAL FIBRILLATION (HCC): ICD-10-CM

## 2018-08-20 DIAGNOSIS — I48.20 CHRONIC ATRIAL FIBRILLATION (HCC): ICD-10-CM

## 2018-08-20 DIAGNOSIS — I10 HTN (HYPERTENSION), BENIGN: ICD-10-CM

## 2018-08-20 RX ORDER — HYDRALAZINE HYDROCHLORIDE 50 MG/1
50 TABLET, FILM COATED ORAL 2 TIMES DAILY
Qty: 60 TABLET | Refills: 6 | Status: SHIPPED | OUTPATIENT
Start: 2018-08-20 | End: 2019-03-09 | Stop reason: SDUPTHER

## 2018-08-20 RX ORDER — LISINOPRIL 20 MG/1
20 TABLET ORAL 2 TIMES DAILY
Qty: 60 TABLET | Refills: 6 | Status: SHIPPED | OUTPATIENT
Start: 2018-08-20 | End: 2019-03-21 | Stop reason: SDUPTHER

## 2018-08-20 NOTE — TELEPHONE ENCOUNTER
Pharmacy called needing new script for Lisinopril 10 mg bid Hydralazine 50 mg bid   These were changed while in [facility] they are giving patient a few till we call

## 2018-09-06 RX ORDER — DOFETILIDE 0.25 MG/1
CAPSULE ORAL
Qty: 60 CAPSULE | Refills: 3 | Status: SHIPPED | OUTPATIENT
Start: 2018-09-06 | End: 2019-02-01 | Stop reason: SDUPTHER

## 2018-09-14 ENCOUNTER — OFFICE VISIT (OUTPATIENT)
Dept: CARDIOLOGY CLINIC | Age: 68
End: 2018-09-14

## 2018-09-14 VITALS
OXYGEN SATURATION: 96 % | HEIGHT: 72 IN | HEART RATE: 77 BPM | DIASTOLIC BLOOD PRESSURE: 60 MMHG | SYSTOLIC BLOOD PRESSURE: 122 MMHG | RESPIRATION RATE: 17 BRPM | WEIGHT: 257.4 LBS | BODY MASS INDEX: 34.86 KG/M2

## 2018-09-14 DIAGNOSIS — I10 HTN (HYPERTENSION), BENIGN: ICD-10-CM

## 2018-09-14 DIAGNOSIS — I48.91 ATRIAL FIBRILLATION, UNSPECIFIED TYPE (HCC): ICD-10-CM

## 2018-09-14 DIAGNOSIS — Z79.899 VISIT FOR MONITORING TIKOSYN THERAPY: ICD-10-CM

## 2018-09-14 DIAGNOSIS — Z51.81 VISIT FOR MONITORING TIKOSYN THERAPY: ICD-10-CM

## 2018-09-14 DIAGNOSIS — Z79.899 MEDICATION MANAGEMENT: Primary | ICD-10-CM

## 2018-09-14 PROCEDURE — G8417 CALC BMI ABV UP PARAM F/U: HCPCS | Performed by: NURSE PRACTITIONER

## 2018-09-14 PROCEDURE — 1101F PT FALLS ASSESS-DOCD LE1/YR: CPT | Performed by: NURSE PRACTITIONER

## 2018-09-14 PROCEDURE — 93000 ELECTROCARDIOGRAM COMPLETE: CPT | Performed by: NURSE PRACTITIONER

## 2018-09-14 PROCEDURE — G8427 DOCREV CUR MEDS BY ELIG CLIN: HCPCS | Performed by: NURSE PRACTITIONER

## 2018-09-14 PROCEDURE — 1123F ACP DISCUSS/DSCN MKR DOCD: CPT | Performed by: NURSE PRACTITIONER

## 2018-09-14 PROCEDURE — 4040F PNEUMOC VAC/ADMIN/RCVD: CPT | Performed by: NURSE PRACTITIONER

## 2018-09-14 PROCEDURE — 99213 OFFICE O/P EST LOW 20 MIN: CPT | Performed by: NURSE PRACTITIONER

## 2018-09-14 PROCEDURE — 1036F TOBACCO NON-USER: CPT | Performed by: NURSE PRACTITIONER

## 2018-09-14 PROCEDURE — 3017F COLORECTAL CA SCREEN DOC REV: CPT | Performed by: NURSE PRACTITIONER

## 2018-09-14 NOTE — PROGRESS NOTES
Electrophysiology FU Note      Chief complaint :   Atrial fibrillation    Referring physician:  Zebedee Simmonds      Primary care physician: Sylvia Leyva MD      History of Present Illness: This visit (9/14/2018)  Patient presents s/p cardioversion and tikosyn loading 6/5/2018. Patient denies CP, SOB, palpitations, dizziness. Complaining of bruising due to eliquis. Previous visit (6/15/2018)  Chief Complaint   Patient presents with    Atrial Fibrillation     Ak-s/p cardioversion and Tikosyn 6/5. Patient denies CP, SOB, palpitations. Does report some dizziness when standing but this is occasional. Patient brought a list of his blood pressure readings. Previous visit: (5/25/2018  Chief Complaint   Patient presents with    Atrial Fibrillation     Ak-Discuss Afib on device. Patient denies CP, palpitations. Denies edema. Does report lightheadedness upon standing at times, but subsides as soon as he gets moving. Patient has an ablation 2014 and cardioversion 2015. Patient does report using CPAP. Patient reports not usually have symptoms of Afib. States he only did the first time. Previous visit:  Chief Complaint   Patient presents with    Follow Up After Procedure     Patient had KVNG and DCCV done 5/18/15. Patient states he does feel any different now when at rest than he did before DCCV. Denies chest pain, dizziness, shortness of breath, palpitations, and edema. He reports though he is able to do more garden work and is more active though.         Past Medical History:   Diagnosis Date    Atrial fibrillation [facility])     Cardiac pacemaker 2/19/2014    BIV ICD Serial # BL [id number] Moderl # [id number]    Cardiomyopathy ([facility]     COPD (chronic obstructive pulmonary disease) ([facility])     Sees Dr. Gutierrez Steele    Hematoma - postoperative 02/19/2014    Pseudo aneurism post op at [address] groin    History of blood transfusion 2014    No Reaction To Blood Transfusion Received    History of cardiac cath 12/4/2013 12/13 EF30% mild LAD, CX     History of cardiovascular disorder 12/4/2013    EF 29%, mild ischemia RCA    History of cardioversion 12/6/2013 12/13 unsuccessful    History of cardioversion 5/18/15    DCCV-.  History of echocardiogram 12/4/2013 12/13 EF 30-35%, mild dilated LV, severe LV syst funct, mild MR, TR     History of transesophageal echocardiography (KVNG) for monitoring 12/4/2013 12/13 no clots    Hyperlipidemia     Hypertelorism     Hypertension     Obesity     S/P ablation of atrial fibrillation 02/14/2014    for a-fib by dr. Marc Espana S/P cardiac cath 2/5/14    Shortness of breath on exertion     Sleep apnea     Uses CPAP    Teeth missing     Upper And Lower    Wears glasses     [place] teeth extracted     4 [place] Teeth Extracted In Past       Past Surgical History:   Procedure Laterality Date    ATRIAL ABLATION SURGERY  2/21/2014    for a-fib    CARDIAC DEFIBRILLATOR PLACEMENT  02/19/2014    Medtronic Viva S CRT-D Defibrillator Implanted    CARDIOVERSION  05/18/2015    KVNG/Cardioversion    COLONOSCOPY  Early 2000's    DENTAL SURGERY      Teeth Extracted In Past    HERNIA REPAIR Right 11/10/2017    inguinal hernia repair with mesh/ robotic    KNEE ARTHROSCOPY Left 1999    WISDOM TOOTH EXTRACTION      4 [place] Teeth Extracted In Past       Family History   Problem Relation Age of Onset    High Blood Pressure Brother     Heart Disease Brother     Cancer Mother         Cancer Unsure What Kind    Early Death Mother 44        Cancer Unsure What Kind    Heart Attack Father     Heart Disease Father         Heart Attack    Diabetes Father         reports that he quit smoking about 44 years ago. His smoking use included Cigarettes and Cigars. He started smoking about 48 years ago. He has a 4.00 pack-year smoking history. He has never used smokeless tobacco. He reports that he drinks alcohol.  He reports that diarrhea, constipation and blood in stool. Endocrine: Negative for cold intolerance and heat intolerance. Genitourinary: Negative for dysuria, hematuria and flank pain. Musculoskeletal: Positive for arthralgias. Negative for myalgias, back pain and neck stiffness. Skin: Negative for color change and rash. Allergic/Immunologic: Negative for food allergies. Neurological:  Negative for  numbness and headaches. Occasional dizziness when he stands up. Hematological: Does not bruise/bleed easily. Psychiatric/Behavioral: Negative for confusion and agitation. Physical Examination:    /60 (Site: Right Upper Arm, Position: Sitting, Cuff Size: Large Adult)   Pulse 77   Resp 17   Ht 6' (1.829 m)   Wt 257 lb 6.4 oz (116.8 kg)   SpO2 96%   BMI 34.91 kg/m²    Wt Readings from Last 3 Encounters:   09/14/18 257 lb 6.4 oz (116.8 kg)   06/15/18 258 lb 9.6 oz (117.3 kg)   06/08/18 259 lb 12.8 oz (117.8 kg)     Body mass index is 34.91 kg/m². Physical Exam   Constitutional: He is oriented to person, place, and time and well-developed, well-nourished, and in no distress. HENT:   Head: Normocephalic and atraumatic. Eyes: Conjunctivae and EOM are normal. Pupils are equal, round, and reactive to light. Right eye exhibits no discharge. Neck: Normal range of motion. No JVD present. No thyromegaly present. Cardiovascular: Normal rate and regular rhythm. Exam reveals no friction rub. Murmur heard. Pulmonary/Chest: Effort normal and breath sounds normal. No stridor. No respiratory distress. He has no wheezes. Abdominal: Soft. Bowel sounds are normal. He exhibits no distension. There is no tenderness. Musculoskeletal: Normal range of motion. He exhibits no edema or tenderness. Neurological: He is alert and oriented to person, place, and time. No cranial nerve deficit. Skin: Skin is warm and dry. No rash noted. No erythema.    Psychiatric: Mood and affect normal.     CBC:   Lab Results

## 2018-10-03 ENCOUNTER — TELEPHONE (OUTPATIENT)
Dept: CARDIOLOGY CLINIC | Age: 68
End: 2018-10-03

## 2018-10-03 NOTE — TELEPHONE ENCOUNTER
According to [facility] hospitals prescription for Tikosyn sent in 9/6/2018 with 3 refills. Did verify refills available with [address]. Message left on patients voicemail advising. Also asked patient to call the office with any further questions or concerns.

## 2018-11-08 ENCOUNTER — OFFICE VISIT (OUTPATIENT)
Dept: PULMONOLOGY | Age: 68
End: 2018-11-08
Payer: MEDICARE

## 2018-11-08 VITALS
HEART RATE: 79 BPM | SYSTOLIC BLOOD PRESSURE: 128 MMHG | OXYGEN SATURATION: 97 % | WEIGHT: 261.2 LBS | BODY MASS INDEX: 35.38 KG/M2 | DIASTOLIC BLOOD PRESSURE: 76 MMHG | HEIGHT: 72 IN

## 2018-11-08 DIAGNOSIS — G47.33 OBSTRUCTIVE SLEEP APNEA: ICD-10-CM

## 2018-11-08 DIAGNOSIS — J42 CHRONIC BRONCHITIS, UNSPECIFIED CHRONIC BRONCHITIS TYPE (HCC): Primary | ICD-10-CM

## 2018-11-08 PROCEDURE — 99213 OFFICE O/P EST LOW 20 MIN: CPT | Performed by: INTERNAL MEDICINE

## 2018-11-12 ENCOUNTER — PROCEDURE VISIT (OUTPATIENT)
Dept: CARDIOLOGY CLINIC | Age: 68
End: 2018-11-12
Payer: MEDICARE

## 2018-11-12 DIAGNOSIS — Z95.810 ICD (IMPLANTABLE CARDIOVERTER-DEFIBRILLATOR), BIVENTRICULAR, IN SITU: Primary | ICD-10-CM

## 2018-11-12 PROCEDURE — 93295 DEV INTERROG REMOTE 1/2/MLT: CPT | Performed by: INTERNAL MEDICINE

## 2018-11-12 PROCEDURE — 93296 REM INTERROG EVL PM/IDS: CPT | Performed by: INTERNAL MEDICINE

## 2018-11-12 PROCEDURE — 93297 REM INTERROG DEV EVAL ICPMS: CPT | Performed by: INTERNAL MEDICINE

## 2018-12-11 ENCOUNTER — NURSE ONLY (OUTPATIENT)
Dept: PULMONOLOGY | Age: 68
End: 2018-12-11

## 2018-12-11 DIAGNOSIS — J42 CHRONIC BRONCHITIS, UNSPECIFIED CHRONIC BRONCHITIS TYPE (HCC): ICD-10-CM

## 2018-12-11 DIAGNOSIS — J44.9 COPD, MILD (HCC): Primary | ICD-10-CM

## 2018-12-11 LAB
EXPIRATORY TIME-POST: NORMAL SEC
EXPIRATORY TIME: NORMAL SEC
FEF 25-75% %CHNG: NORMAL
FEF 25-75% %PRED-POST: NORMAL %
FEF 25-75% %PRED-PRE: NORMAL L/SEC
FEF 25-75% PRED: NORMAL L/SEC
FEF 25-75%-POST: NORMAL L/SEC
FEF 25-75%-PRE: NORMAL L/SEC
FEV1 %PRED-POST: 72 %
FEV1 %PRED-PRE: 72 %
FEV1 PRED: 3.67 L
FEV1-POST: 2.64 L
FEV1-PRE: 2.63 L
FEV1/FVC %PRED-POST: 97 %
FEV1/FVC %PRED-PRE: 107 %
FEV1/FVC PRED: 74.4 %
FEV1/FVC-POST: 72.1 %
FEV1/FVC-PRE: 79.9 %
FVC %PRED-POST: 75 L
FVC %PRED-PRE: 67 %
FVC PRED: 4.92 L
FVC-POST: 3.67 L
FVC-PRE: 3.29 L
PEF %PRED-POST: NORMAL %
PEF %PRED-PRE: NORMAL L/SEC
PEF PRED: NORMAL L/SEC
PEF%CHNG: NORMAL
PEF-POST: NORMAL L/SEC
PEF-PRE: NORMAL L/SEC

## 2018-12-11 PROCEDURE — 94060 EVALUATION OF WHEEZING: CPT | Performed by: INTERNAL MEDICINE

## 2018-12-11 PROCEDURE — 99999 PR OFFICE/OUTPT VISIT,PROCEDURE ONLY: CPT | Performed by: INTERNAL MEDICINE

## 2018-12-11 ASSESSMENT — PULMONARY FUNCTION TESTS
FVC_PREDICTED: 4.92
FVC_PERCENT_PREDICTED_PRE: 67
FEV1/FVC_PERCENT_PREDICTED_POST: 97
FVC_PERCENT_PREDICTED_POST: 75
FVC_POST: 3.67
FVC_PRE: 3.29
FEV1_PERCENT_PREDICTED_POST: 72
FEV1/FVC_POST: 72.1
FEV1_PREDICTED: 3.67
FEV1/FVC_PREDICTED: 74.4
FEV1_PRE: 2.63
FEV1_PERCENT_PREDICTED_PRE: 72
FEV1_POST: 2.64
FEV1/FVC_PRE: 79.9
FEV1/FVC_PERCENT_PREDICTED_PRE: 107

## 2019-02-04 RX ORDER — DOFETILIDE 0.25 MG/1
CAPSULE ORAL
Qty: 60 CAPSULE | Refills: 3 | Status: SHIPPED | OUTPATIENT
Start: 2019-02-04 | End: 2019-05-30 | Stop reason: SDUPTHER

## 2019-02-21 ENCOUNTER — PROCEDURE VISIT (OUTPATIENT)
Dept: CARDIOLOGY CLINIC | Age: 69
End: 2019-02-21
Payer: MEDICARE

## 2019-02-21 DIAGNOSIS — Z95.810 ICD (IMPLANTABLE CARDIOVERTER-DEFIBRILLATOR), BIVENTRICULAR, IN SITU: Primary | ICD-10-CM

## 2019-02-21 PROCEDURE — 93295 DEV INTERROG REMOTE 1/2/MLT: CPT | Performed by: INTERNAL MEDICINE

## 2019-02-21 PROCEDURE — 93296 REM INTERROG EVL PM/IDS: CPT | Performed by: INTERNAL MEDICINE

## 2019-02-21 PROCEDURE — 93297 REM INTERROG DEV EVAL ICPMS: CPT | Performed by: INTERNAL MEDICINE

## 2019-03-09 DIAGNOSIS — I10 HTN (HYPERTENSION), BENIGN: ICD-10-CM

## 2019-03-09 DIAGNOSIS — I48.91 LONE ATRIAL FIBRILLATION (HCC): ICD-10-CM

## 2019-03-09 DIAGNOSIS — I48.20 CHRONIC ATRIAL FIBRILLATION (HCC): ICD-10-CM

## 2019-03-11 ENCOUNTER — OFFICE VISIT (OUTPATIENT)
Dept: CARDIOLOGY CLINIC | Age: 69
End: 2019-03-11
Payer: MEDICARE

## 2019-03-11 VITALS
HEART RATE: 80 BPM | BODY MASS INDEX: 36.3 KG/M2 | HEIGHT: 72 IN | WEIGHT: 268 LBS | DIASTOLIC BLOOD PRESSURE: 60 MMHG | SYSTOLIC BLOOD PRESSURE: 110 MMHG

## 2019-03-11 DIAGNOSIS — I48.91 ATRIAL FIBRILLATION, UNSPECIFIED TYPE (HCC): Primary | ICD-10-CM

## 2019-03-11 DIAGNOSIS — R06.02 SOB (SHORTNESS OF BREATH): ICD-10-CM

## 2019-03-11 PROCEDURE — 1101F PT FALLS ASSESS-DOCD LE1/YR: CPT | Performed by: INTERNAL MEDICINE

## 2019-03-11 PROCEDURE — 99214 OFFICE O/P EST MOD 30 MIN: CPT | Performed by: INTERNAL MEDICINE

## 2019-03-11 PROCEDURE — 93000 ELECTROCARDIOGRAM COMPLETE: CPT | Performed by: INTERNAL MEDICINE

## 2019-03-11 PROCEDURE — G8417 CALC BMI ABV UP PARAM F/U: HCPCS | Performed by: INTERNAL MEDICINE

## 2019-03-11 PROCEDURE — G8484 FLU IMMUNIZE NO ADMIN: HCPCS | Performed by: INTERNAL MEDICINE

## 2019-03-11 PROCEDURE — 3017F COLORECTAL CA SCREEN DOC REV: CPT | Performed by: INTERNAL MEDICINE

## 2019-03-11 PROCEDURE — 1036F TOBACCO NON-USER: CPT | Performed by: INTERNAL MEDICINE

## 2019-03-11 PROCEDURE — 4040F PNEUMOC VAC/ADMIN/RCVD: CPT | Performed by: INTERNAL MEDICINE

## 2019-03-11 PROCEDURE — G8427 DOCREV CUR MEDS BY ELIG CLIN: HCPCS | Performed by: INTERNAL MEDICINE

## 2019-03-11 PROCEDURE — 1123F ACP DISCUSS/DSCN MKR DOCD: CPT | Performed by: INTERNAL MEDICINE

## 2019-03-11 RX ORDER — HYDRALAZINE HYDROCHLORIDE 50 MG/1
50 TABLET, FILM COATED ORAL 2 TIMES DAILY
Qty: 60 TABLET | Refills: 6 | Status: SHIPPED | OUTPATIENT
Start: 2019-03-11 | End: 2020-02-04

## 2019-03-12 ENCOUNTER — TELEPHONE (OUTPATIENT)
Dept: CARDIOLOGY CLINIC | Age: 69
End: 2019-03-12

## 2019-03-21 RX ORDER — LISINOPRIL 20 MG/1
20 TABLET ORAL 2 TIMES DAILY
Qty: 180 TABLET | Refills: 1 | Status: SHIPPED | OUTPATIENT
Start: 2019-03-21 | End: 2019-08-13 | Stop reason: SDUPTHER

## 2019-04-01 ENCOUNTER — PROCEDURE VISIT (OUTPATIENT)
Dept: CARDIOLOGY CLINIC | Age: 69
End: 2019-04-01
Payer: MEDICARE

## 2019-04-01 DIAGNOSIS — I48.91 ATRIAL FIBRILLATION, UNSPECIFIED TYPE (HCC): ICD-10-CM

## 2019-04-01 DIAGNOSIS — R06.02 SOB (SHORTNESS OF BREATH): Primary | ICD-10-CM

## 2019-04-01 LAB
LV EF: 53 %
LVEF MODALITY: NORMAL

## 2019-04-01 PROCEDURE — 93306 TTE W/DOPPLER COMPLETE: CPT | Performed by: INTERNAL MEDICINE

## 2019-04-02 ENCOUNTER — TELEPHONE (OUTPATIENT)
Dept: CARDIOLOGY CLINIC | Age: 69
End: 2019-04-02

## 2019-05-29 ENCOUNTER — PROCEDURE VISIT (OUTPATIENT)
Dept: CARDIOLOGY CLINIC | Age: 69
End: 2019-05-29
Payer: MEDICARE

## 2019-05-29 DIAGNOSIS — Z95.810 ICD (IMPLANTABLE CARDIOVERTER-DEFIBRILLATOR), BIVENTRICULAR, IN SITU: Primary | ICD-10-CM

## 2019-05-29 PROCEDURE — 93297 REM INTERROG DEV EVAL ICPMS: CPT | Performed by: INTERNAL MEDICINE

## 2019-05-29 PROCEDURE — 93295 DEV INTERROG REMOTE 1/2/MLT: CPT | Performed by: INTERNAL MEDICINE

## 2019-05-29 PROCEDURE — 93296 REM INTERROG EVL PM/IDS: CPT | Performed by: INTERNAL MEDICINE

## 2019-05-30 RX ORDER — DOFETILIDE 0.25 MG/1
CAPSULE ORAL
Qty: 60 CAPSULE | Refills: 3 | Status: SHIPPED | OUTPATIENT
Start: 2019-05-30 | End: 2019-10-01 | Stop reason: SDUPTHER

## 2019-06-27 ENCOUNTER — TELEPHONE (OUTPATIENT)
Dept: CARDIOLOGY CLINIC | Age: 69
End: 2019-06-27

## 2019-08-09 ENCOUNTER — HOSPITAL ENCOUNTER (OUTPATIENT)
Age: 69
Discharge: HOME | End: 2019-08-09

## 2019-08-09 LAB
ANION GAP SERPL CALCULATED.3IONS-SCNC: 9 MMOL/L (ref 4–16)
BUN BLDV-MCNC: 12 MG/DL (ref 6–23)
CALCIUM SERPL-MCNC: 8.9 MG/DL (ref 8.3–10.6)
CHLORIDE BLD-SCNC: 101 MMOL/L (ref 99–110)
CO2: 28 MMOL/L (ref 21–32)
CREAT SERPL-MCNC: 1 MG/DL (ref 0.9–1.3)
GFR AFRICAN AMERICAN: >60 ML/MIN/1.73M2
GFR NON-AFRICAN AMERICAN: >60 ML/MIN/1.73M2
GLUCOSE BLD-MCNC: 107 MG/DL (ref 70–99)
HCT VFR BLD CALC: 32.4 % (ref 42–52)
HEMOGLOBIN: 10.1 GM/DL (ref 13.5–18)
MCH RBC QN AUTO: 30 PG (ref 27–31)
MCHC RBC AUTO-ENTMCNC: 31.2 % (ref 32–36)
MCV RBC AUTO: 96.1 FL (ref 78–100)
PDW BLD-RTO: 13.6 % (ref 11.7–14.9)
PLATELET # BLD: 199 K/CU MM (ref 140–440)
PMV BLD AUTO: 11.1 FL (ref 7.5–11.1)
POTASSIUM SERPL-SCNC: 4.7 MMOL/L (ref 3.5–5.1)
PRO-BNP: 276 PG/ML
RBC # BLD: 3.37 M/CU MM (ref 4.6–6.2)
SODIUM BLD-SCNC: 138 MMOL/L (ref 135–145)
WBC # BLD: 9 K/CU MM (ref 4–10.5)

## 2019-08-09 PROCEDURE — 85027 COMPLETE CBC AUTOMATED: CPT

## 2019-08-09 PROCEDURE — 83880 ASSAY OF NATRIURETIC PEPTIDE: CPT

## 2019-08-09 PROCEDURE — 36415 COLL VENOUS BLD VENIPUNCTURE: CPT

## 2019-08-09 PROCEDURE — 80048 BASIC METABOLIC PNL TOTAL CA: CPT

## 2019-08-13 RX ORDER — LISINOPRIL 20 MG/1
TABLET ORAL
Qty: 180 TABLET | Refills: 1 | Status: SHIPPED | OUTPATIENT
Start: 2019-08-13

## 2019-09-12 ENCOUNTER — PROCEDURE VISIT (OUTPATIENT)
Dept: CARDIOLOGY CLINIC | Age: 69
End: 2019-09-12
Payer: MEDICARE

## 2019-09-12 DIAGNOSIS — Z95.810 ICD (IMPLANTABLE CARDIOVERTER-DEFIBRILLATOR), BIVENTRICULAR, IN SITU: Primary | ICD-10-CM

## 2019-09-13 PROCEDURE — 93295 DEV INTERROG REMOTE 1/2/MLT: CPT | Performed by: INTERNAL MEDICINE

## 2019-09-13 PROCEDURE — 93297 REM INTERROG DEV EVAL ICPMS: CPT | Performed by: INTERNAL MEDICINE

## 2019-09-13 PROCEDURE — 93296 REM INTERROG EVL PM/IDS: CPT | Performed by: INTERNAL MEDICINE

## 2019-10-01 RX ORDER — DOFETILIDE 0.25 MG/1
CAPSULE ORAL
Qty: 60 CAPSULE | Refills: 3 | Status: SHIPPED | OUTPATIENT
Start: 2019-10-01 | End: 2020-02-03 | Stop reason: SDUPTHER

## 2019-11-20 ENCOUNTER — OFFICE VISIT (OUTPATIENT)
Dept: PULMONOLOGY | Age: 69
End: 2019-11-20
Payer: MEDICARE

## 2019-11-20 VITALS
SYSTOLIC BLOOD PRESSURE: 132 MMHG | BODY MASS INDEX: 34.1 KG/M2 | HEART RATE: 84 BPM | WEIGHT: 251.4 LBS | DIASTOLIC BLOOD PRESSURE: 80 MMHG | OXYGEN SATURATION: 98 %

## 2019-11-20 DIAGNOSIS — G47.33 OBSTRUCTIVE SLEEP APNEA: Primary | ICD-10-CM

## 2019-11-20 DIAGNOSIS — J42 CHRONIC BRONCHITIS, UNSPECIFIED CHRONIC BRONCHITIS TYPE (HCC): ICD-10-CM

## 2019-11-20 PROCEDURE — 3017F COLORECTAL CA SCREEN DOC REV: CPT | Performed by: INTERNAL MEDICINE

## 2019-11-20 PROCEDURE — G8417 CALC BMI ABV UP PARAM F/U: HCPCS | Performed by: INTERNAL MEDICINE

## 2019-11-20 PROCEDURE — G8484 FLU IMMUNIZE NO ADMIN: HCPCS | Performed by: INTERNAL MEDICINE

## 2019-11-20 PROCEDURE — 99213 OFFICE O/P EST LOW 20 MIN: CPT | Performed by: INTERNAL MEDICINE

## 2019-11-20 PROCEDURE — 1123F ACP DISCUSS/DSCN MKR DOCD: CPT | Performed by: INTERNAL MEDICINE

## 2019-11-20 PROCEDURE — G8926 SPIRO NO PERF OR DOC: HCPCS | Performed by: INTERNAL MEDICINE

## 2019-11-20 PROCEDURE — 1036F TOBACCO NON-USER: CPT | Performed by: INTERNAL MEDICINE

## 2019-11-20 PROCEDURE — G8427 DOCREV CUR MEDS BY ELIG CLIN: HCPCS | Performed by: INTERNAL MEDICINE

## 2019-11-20 PROCEDURE — 4040F PNEUMOC VAC/ADMIN/RCVD: CPT | Performed by: INTERNAL MEDICINE

## 2019-11-20 PROCEDURE — 3023F SPIROM DOC REV: CPT | Performed by: INTERNAL MEDICINE

## 2019-12-10 ENCOUNTER — TELEPHONE (OUTPATIENT)
Dept: CARDIOLOGY CLINIC | Age: 69
End: 2019-12-10

## 2019-12-10 ENCOUNTER — PROCEDURE VISIT (OUTPATIENT)
Dept: CARDIOLOGY CLINIC | Age: 69
End: 2019-12-10

## 2019-12-10 DIAGNOSIS — Z95.810 ICD (IMPLANTABLE CARDIOVERTER-DEFIBRILLATOR), BIVENTRICULAR, IN SITU: Primary | ICD-10-CM

## 2020-02-03 RX ORDER — DOFETILIDE 0.25 MG/1
CAPSULE ORAL
Qty: 60 CAPSULE | Refills: 3 | Status: SHIPPED | OUTPATIENT
Start: 2020-02-03 | End: 2020-02-04 | Stop reason: SDUPTHER

## 2020-02-04 ENCOUNTER — OFFICE VISIT (OUTPATIENT)
Dept: CARDIOLOGY CLINIC | Age: 70
End: 2020-02-04
Payer: MEDICARE

## 2020-02-04 VITALS
SYSTOLIC BLOOD PRESSURE: 138 MMHG | WEIGHT: 266.8 LBS | HEART RATE: 80 BPM | HEIGHT: 70 IN | DIASTOLIC BLOOD PRESSURE: 90 MMHG | BODY MASS INDEX: 38.2 KG/M2

## 2020-02-04 PROCEDURE — 3017F COLORECTAL CA SCREEN DOC REV: CPT | Performed by: INTERNAL MEDICINE

## 2020-02-04 PROCEDURE — 99214 OFFICE O/P EST MOD 30 MIN: CPT | Performed by: INTERNAL MEDICINE

## 2020-02-04 PROCEDURE — G8417 CALC BMI ABV UP PARAM F/U: HCPCS | Performed by: INTERNAL MEDICINE

## 2020-02-04 PROCEDURE — G8484 FLU IMMUNIZE NO ADMIN: HCPCS | Performed by: INTERNAL MEDICINE

## 2020-02-04 PROCEDURE — 93000 ELECTROCARDIOGRAM COMPLETE: CPT | Performed by: INTERNAL MEDICINE

## 2020-02-04 PROCEDURE — 1036F TOBACCO NON-USER: CPT | Performed by: INTERNAL MEDICINE

## 2020-02-04 PROCEDURE — 1123F ACP DISCUSS/DSCN MKR DOCD: CPT | Performed by: INTERNAL MEDICINE

## 2020-02-04 PROCEDURE — G8427 DOCREV CUR MEDS BY ELIG CLIN: HCPCS | Performed by: INTERNAL MEDICINE

## 2020-02-04 PROCEDURE — 4040F PNEUMOC VAC/ADMIN/RCVD: CPT | Performed by: INTERNAL MEDICINE

## 2020-02-04 RX ORDER — DOFETILIDE 0.25 MG/1
CAPSULE ORAL
Qty: 60 CAPSULE | Refills: 3 | Status: SHIPPED | OUTPATIENT
Start: 2020-02-04 | End: 2020-05-28 | Stop reason: SDUPTHER

## 2020-02-04 NOTE — PROGRESS NOTES
Reported on 11/20/2019) 180 tablet 1    zolpidem (AMBIEN) 10 MG tablet Take by mouth nightly      triamterene-hydrochlorothiazide (MAXZIDE-25) 37.5-25 MG per tablet Take 1 tablet by mouth every morning        No current facility-administered medications for this visit. Allergies: Patient has no known allergies. Past Medical History:   Diagnosis Date    Atrial fibrillation [facility])     Cardiac pacemaker 2/19/2014    BIV ICD Serial # BL [id number] Moderl # [id number]    Cardiomyopathy ([facility]     COPD (chronic obstructive pulmonary disease) ([facility]     Sees Dr. Jaswinder Mon    Hematoma - postoperative 02/19/2014    Pseudo aneurism post op at [address] groin    History of blood transfusion 2014    No Reaction To Blood Transfusion Received    History of cardiac cath 12/4/2013 12/13 EF30% mild LAD, CX     History of cardiovascular disorder 12/4/2013    EF 29%, mild ischemia RCA    History of cardioversion 12/6/2013 12/13 unsuccessful    History of cardioversion 5/18/15    DCCV-.      History of echocardiogram 04/01/2019    History of transesophageal echocardiography (KVNG) for monitoring 12/4/2013 12/13 no clots    Hyperlipidemia     Hypertelorism     Hypertension     Obesity     S/P ablation of atrial fibrillation 02/14/2014    for a-fib by dr. Marty Mayfield S/P cardiac cath 2/5/14    Shortness of breath on exertion     Sleep apnea     Uses CPAP    Teeth missing     Upper And Lower    Wears glasses     [place] teeth extracted     4 [place] Teeth Extracted In Past     Past Surgical History:   Procedure Laterality Date    ATRIAL ABLATION SURGERY  2/21/2014    for a-fib    CARDIAC DEFIBRILLATOR PLACEMENT  02/19/2014    Medtronic Viva S CRT-D Defibrillator Implanted    CARDIOVERSION  05/18/2015    KVNG/Cardioversion    COLONOSCOPY  Early 2000's    DENTAL SURGERY      Teeth Extracted In Past    HERNIA REPAIR Right 11/10/2017    inguinal hernia repair with mesh/ robotic    KNEE ARTHROSCOPY Left 1999    251 lb 6.4 oz (114 kg)   03/11/19 268 lb (121.6 kg)     Body mass index is 38.28 kg/m². GENERAL - Alert, oriented, pleasant, in no apparent distress. EYES: No jaundice, no conjunctival pallor. SKIN: It is warm & dry. No rashes. No Echhymosis    HEENT - No clinically significant abnormalities seen. Neck - Supple. No jugular venous distention noted. No carotid bruits. Cardiovascular - Normal S1 and S2 without obvious murmur or gallop. Extremities - No cyanosis, clubbing, or significant edema. Pulmonary - No respiratory distress. No wheezes or rales. Abdomen - No masses, tenderness, or organomegaly. Musculoskeletal - No significant edema. No joint deformities. No muscle wasting. Neurologic - Cranial nerves II through XII are grossly intact. There were no gross focal neurologic abnormalities. Lab Review   Lab Results   Component Value Date    TROPONINT <0.010 04/02/2018     BNP:    Lab Results   Component Value Date     02/04/2014     PT/INR:    Lab Results   Component Value Date    INR 1.54 06/05/2018     No results found for: LABA1C  Lab Results   Component Value Date    WBC 9.0 08/09/2019    HCT 32.4 (L) 08/09/2019    MCV 96.1 08/09/2019     08/09/2019     Lab Results   Component Value Date    CHOL 134 02/05/2014    TRIG 104 02/05/2014    HDL 31 (L) 02/05/2014    LDLDIRECT 93 02/05/2014     Lab Results   Component Value Date    ALT 27 04/02/2018    AST 25 04/02/2018     BMP:    Lab Results   Component Value Date     08/09/2019    K 4.7 08/09/2019     08/09/2019    CO2 28 08/09/2019    BUN 12 08/09/2019    CREATININE 1.0 08/09/2019     CMP:   Lab Results   Component Value Date     08/09/2019    K 4.7 08/09/2019     08/09/2019    CO2 28 08/09/2019    BUN 12 08/09/2019    PROT 6.7 04/02/2018     TSH:    Lab Results   Component Value Date    TSHHS 1.146 12/04/2013       QUALITY MEASURES REVIEWED:    Impression:    1.  History of atrial fibrillation       Patient

## 2020-02-04 NOTE — PROGRESS NOTES
[id number] Score for Atrial Fibrillation Stroke Risk   Risk   Factors  Component Value   C CHF No 0   H HTN Yes 1   A2 Age >= 76 No,  (80 y.o.) 0   D DM No 0   S2 Prior Stroke/TIA No 0   V Vascular Disease No 0   A Age 74-69 Yes,  (80 y.o.) 1   Sc Sex male 0    [id number]  Score  2   Score last updated 5/7/28 7:15 AM    Click here for a link to the UpToDate guideline \"Atrial Fibrillation: Anticoagulation therapy to prevent embolization    Disclaimer: Risk Score calculation is dependent on accuracy of patient problem list and past encounter diagnosis.

## 2020-02-04 NOTE — LETTER
CLINICAL STAFF DOCUMENTATION    Tanja Benavides  1950  [id number]    Have you had any Chest Pain - No     Have you had any Shortness of Breath - No       ave you had any dizziness - No       Have you had any palpitations - No     Is the patient on any of the following medications - Dofetilide (Tikosyn)  If Yes DO EKG    Do you have any edema - swelling in legs to ankles    If Yes - CHECK TO SEE IF THE EDEMA IS PITTING  How long have they been having edema - Months  If Yes - Have they worn compression stockings No    Check Venous \"LEG PROBLEM Questionnaire\"    Do you have a surgery or procedure scheduled in the near future - No  If Yes- DO EKG        Ask patient if they want to sign up for MyChart if they are not already signed up    Check to see if we have an E-MAIL on file for the patient    Check medication list thoroughly!!!  BE SURE TO ASK PATIENT IF THEY NEED MEDICATION REFILLS

## 2020-03-20 ENCOUNTER — PROCEDURE VISIT (OUTPATIENT)
Dept: CARDIOLOGY CLINIC | Age: 70
End: 2020-03-20
Payer: MEDICARE

## 2020-03-20 PROCEDURE — 93296 REM INTERROG EVL PM/IDS: CPT | Performed by: INTERNAL MEDICINE

## 2020-03-20 PROCEDURE — 93295 DEV INTERROG REMOTE 1/2/MLT: CPT | Performed by: INTERNAL MEDICINE

## 2020-03-30 ENCOUNTER — TELEPHONE (OUTPATIENT)
Dept: PULMONOLOGY | Age: 70
End: 2020-03-30

## 2020-05-01 ENCOUNTER — TELEPHONE (OUTPATIENT)
Dept: CARDIOLOGY CLINIC | Age: 70
End: 2020-05-01

## 2020-05-28 RX ORDER — DOFETILIDE 0.25 MG/1
CAPSULE ORAL
Qty: 60 CAPSULE | Refills: 3 | Status: SHIPPED | OUTPATIENT
Start: 2020-05-28 | End: 2020-09-29 | Stop reason: SDUPTHER

## 2020-06-22 ENCOUNTER — PROCEDURE VISIT (OUTPATIENT)
Dept: CARDIOLOGY CLINIC | Age: 70
End: 2020-06-22
Payer: MEDICARE

## 2020-06-22 PROCEDURE — 93296 REM INTERROG EVL PM/IDS: CPT | Performed by: INTERNAL MEDICINE

## 2020-06-22 PROCEDURE — 93295 DEV INTERROG REMOTE 1/2/MLT: CPT | Performed by: INTERNAL MEDICINE

## 2020-07-27 ENCOUNTER — VIRTUAL VISIT (OUTPATIENT)
Dept: CARDIOLOGY CLINIC | Age: 70
End: 2020-07-27
Payer: MEDICARE

## 2020-07-27 PROBLEM — Z01.810 PREOP CARDIOVASCULAR EXAM: Status: ACTIVE | Noted: 2018-06-05

## 2020-07-27 PROBLEM — Z98.890 S/P ABLATION OF ATRIAL FIBRILLATION: Status: ACTIVE | Noted: 2020-07-27

## 2020-07-27 PROBLEM — Z86.79 S/P ABLATION OF ATRIAL FIBRILLATION: Status: ACTIVE | Noted: 2020-07-27

## 2020-07-27 PROBLEM — Z95.0 PACEMAKER: Status: ACTIVE | Noted: 2020-07-27

## 2020-07-27 PROCEDURE — 1123F ACP DISCUSS/DSCN MKR DOCD: CPT | Performed by: NURSE PRACTITIONER

## 2020-07-27 PROCEDURE — 1036F TOBACCO NON-USER: CPT | Performed by: NURSE PRACTITIONER

## 2020-07-27 PROCEDURE — G8417 CALC BMI ABV UP PARAM F/U: HCPCS | Performed by: NURSE PRACTITIONER

## 2020-07-27 PROCEDURE — 3017F COLORECTAL CA SCREEN DOC REV: CPT | Performed by: NURSE PRACTITIONER

## 2020-07-27 PROCEDURE — 99214 OFFICE O/P EST MOD 30 MIN: CPT | Performed by: NURSE PRACTITIONER

## 2020-07-27 PROCEDURE — 4040F PNEUMOC VAC/ADMIN/RCVD: CPT | Performed by: NURSE PRACTITIONER

## 2020-07-27 PROCEDURE — G8427 DOCREV CUR MEDS BY ELIG CLIN: HCPCS | Performed by: NURSE PRACTITIONER

## 2020-07-27 RX ORDER — HYDROCHLOROTHIAZIDE 25 MG/1
25 TABLET ORAL DAILY
COMMUNITY
End: 2022-04-14 | Stop reason: CLARIF

## 2020-07-27 ASSESSMENT — ENCOUNTER SYMPTOMS
SHORTNESS OF BREATH: 0
WHEEZING: 0
APNEA: 0

## 2020-07-27 NOTE — PROGRESS NOTES
[facility]                    2020    TELEHEALTH EVALUATION -- Audio/Visual (During ZDPJQ-36 public health emergency)    HPI:    Isabel Denise (:  1950) has requested an audio/video evaluation for : H/O atrial fibrillation s/p ablation, BIVICD, HTN, and HLD. Patient is also here for cardiac clearance for having knee replacement done by Dr. Terrence Pimentel. Review of Systems   Constitutional: Negative for unexpected weight change. Eyes: Negative for visual disturbance. Respiratory: Negative for apnea, shortness of breath and wheezing. Cardiovascular: Negative for chest pain, palpitations and leg swelling. Endocrine: Negative for polyuria. Genitourinary: Negative for difficulty urinating and dysuria. Musculoskeletal: Positive for joint swelling. Negative for arthralgias. Neurological: Negative for dizziness and weakness. Hematological: Does not bruise/bleed easily. Psychiatric/Behavioral: Negative for agitation. Prior to Visit Medications    Medication Sig Taking? Authorizing Provider   hydroCHLOROthiazide (HYDRODIURIL) 25 MG tablet Take 25 mg by mouth daily Yes Historical Provider, MD   apixaban (ELIQUIS) 5 MG TABS tablet Take 1 tablet by mouth 2 times daily Yes JETT Martinez - CNP   dofetilide (TIKOSYN) 250 MCG capsule TAKE 1 CAPSULE BY MOUTH EVERY TWELVE HOURS Yes Kay Nath MD   lisinopril (PRINIVIL;ZESTRIL) 20 MG tablet TAKE 1 TABLET BY MOUTH TWICE DAILY Yes JETT Brown CNP   allopurinol (ZYLOPRIM) 300 MG tablet Take 300 mg by mouth daily Yes Historical Provider, MD   Multiple Vitamins-Minerals (MULTIVITAMIN PO) Take by mouth every morning Over The Counter Yes Historical Provider, MD   CPAP Machine MISC nightly  Yes Historical Provider, MD   carvedilol (COREG) 25 MG tablet Take 1 tablet by mouth 2 times daily (with meals).  Yes Cyndy Almeida MD       Social History     Tobacco Use    Smoking status: Former Smoker     Packs/day: 1.00     Years: 4.00     Pack years: 4.00     Types: Cigarettes, Cigars     Start date:      Last attempt to quit: 1974     Years since quittin.6    Smokeless tobacco: Never Used   Substance Use Topics    Alcohol use: Yes     Alcohol/week: 0.0 standard drinks     Comment: \"Occ. Maybe Once A Week\"    Drug use: No        No Known Allergies,   Past Medical History:   Diagnosis Date    [facility]     Cardiac pacemaker 2014    BIV ICD Serial # [id number] Moderl # [id number]    Cardiomy[facility]     COPD (chronic obstructive pulmonary disease) ([facility]     Sees Dr. Sobeida Forte    Hematoma - postoperative 2014    Pseudo aneurism post op at [address] groin    History of blood transfusion     No Reaction To Blood Transfusion Received    History of cardiac cath 2013 EF30% mild LAD, CX     History of cardiovascular disorder 2013    EF 29%, mild ischemia RCA    History of cardioversion 2013 unsuccessful    History of cardioversion 5/18/15    DCCV-.  History of echocardiogram 2019    History of transesophageal echocardiography (KVNG) for monitoring 2013 no clots    Hyperlipidemia     Hypertelorism     Hypertension     Obesity     S/P ablation of atrial fibrillation 2014    for a-fib by dr. Katy Machado S/P cardiac cath 14    Shortness of breath on exertion     Sleep apnea     Uses CPAP    Teeth missing     Upper And Lower    Wears glasses     Mckinney teeth extracted     4 Mckinney Teeth Extracted In Past   ,   Social History     Tobacco Use    Smoking status: Former Smoker     Packs/day: 1.00     Years: 4.00     Pack years: 4.00     Types: Cigarettes, Cigars     Start date:      Last attempt to quit: 1974     Years since quittin.6    Smokeless tobacco: Never Used   Substance Use Topics    Alcohol use: Yes     Alcohol/week: 0.0 standard drinks     Comment: \"Occ.  Maybe Once A Week\"    Drug use: No   ,   Family History   Problem Relation Age of Onset    High Blood Pressure Brother     Heart Disease Brother     Cancer Mother         Cancer Unsure What Kind    Early Death Mother 44        Cancer Unsure What Kind    Heart Attack Father     Heart Disease Father         Heart Attack    Diabetes Father    ,   Health Maintenance   Topic Date Due    Hepatitis C screen  1950    DTaP/Tdap/Td vaccine (1 - Tdap) 06/18/1971    Diabetes screen  06/18/1992    Shingles Vaccine (1 of 2) 06/18/2002    Colon cancer screen colonoscopy  06/18/2002    Pneumococcal 65+ years Vaccine (1 of 1 - PPSV23) 06/18/2017    Lipid screen  02/05/2019    Annual Wellness Visit (AWV)  06/23/2019    Potassium monitoring  08/09/2020    Creatinine monitoring  08/09/2020    Flu vaccine (1) 09/01/2020    AAA screen  Completed    Hepatitis A vaccine  Aged Out    Hepatitis B vaccine  Aged Out    Hib vaccine  Aged Out    Meningococcal (ACWY) vaccine  Aged Out       PHYSICAL EXAMINATION:  [ INSTRUCTIONS:  \"[x]\" Indicates a positive item  \"[]\" Indicates a negative item  -- DELETE ALL ITEMS NOT EXAMINED]  Vital Signs: (As obtained by patient/caregiver or practitioner observation)      Constitutional: [x] Appears well-developed and well-nourished [] No apparent distress      [] Abnormal-   Mental status  [x] Alert and awake  [x] Oriented to person/place/time [x]Able to follow commands      Eyes:  EOM    [x]  Normal  [] Abnormal-  Sclera  [x]  Normal  [] Abnormal -         Discharge [x]  None visible  [] Abnormal -    HENT:   [x] Normocephalic, atraumatic.   [] Abnormal   [x] Mouth/Throat: Mucous membranes are moist.     External Ears [x] Normal  [] Abnormal-     Neck: [x] No visualized mass     Pulmonary/Chest: [x] Respiratory effort normal.  [] No visualized signs of difficulty breathing or respiratory distress        [] Abnormal-      Musculoskeletal:   [x] Normal gait with no signs of ataxia         [x] Normal range of motion of neck        [] Abnormal-       Neurological:        [x] No Facial Asymmetry (Cranial nerve 7 motor function) (limited exam to video visit)          [x] No gaze palsy        [] Abnormal-         Skin:        [x] No significant exanthematous lesions or discoloration noted on facial skin         [] Abnormal-            Psychiatric:       [x] Normal Affect [] No Hallucinations        [] Abnormal-     Other pertinent observable physical exam findings-     Due to this being a TeleHealth encounter, evaluation of the following organ systems is limited: Vitals/Constitutional/EENT/Resp/CV/GI//MS/Neuro/Skin/Heme-Lymph-Imm. Echo:4/1/19   LV function and size are normal, Ejection Fraction low normal 50-55%. Mild concentric left ventricular hypertrophy. No regional wall motion abnormalities were detected. Normal diastolic filling pattern for age. Mildly dilated left atrium. Mild mitral and tricuspid regurgitation is present. Pacemaker/AICD lead(s) was(were) visualized within the RA/RV cavity. RVSP= 33 mmHg. No evidence of pericardial effusion. Cath:12/4/13  Mild ischemia of RCA, LAD, circumflex    All current labs and testing reviewed        ASSESSMENT/PLAN:  1. HTN (hypertension), benign    Controlled  He is to continue current medications (Hydrochlorothiazide 25 mg, lisinopril 20 mg,   advised low salt diet   Testing ordered:  no   Last testing: Echo 2019    2. Pacemaker  Patient has a Medtronic BiV-ICD, mode: DDDR, estimated battery life is 18 years, Opt. Vol well compensated heart failure, patient is pacemaker dependent. 3. S/P ablation of atrial fibrillation  -Multiple failed cardioversions also had previous ablation of atrial fibrillation at [facility] which failed. Patient then was put on Tikosyn therapy after cardioversion in 2018. And has been following up with Dr. Tatiana Wilson since then. Continue Eliquis     4.  Hyperlipidemia, unspecified hyperlipidemia type    -At or near goal No    -No recent labs available- will request copy from PCP- lab slip given   -He not on any Hepatic function panel WNL. No abdominal pain. No myalgias.     -The nature of cardiac risk has been fully discussed with this patient. I have made him aware of his LDL target goal given his cardiovascular risk analysis. I have discussed the appropriate diet. The need for lifelong compliance in order to reduce risk is stressed. A regular exercise program is recommended to help achieve and maintain normal body weight, fitness and improve lipid balance. A written copy of a low fat, low cholesterol diet has been given to the patient. Preop cardiovascular exam    Revised Cardiac Risk Index:   High risk type of surgery no   H/O ischemic heart disease  (h/o MI, or a positive stress test, current complaint of chest pain considered to be secondary to myocardial ischemia,  Use of nitrate therapy or ECG with pathological Q waves; do not count prior coronary revascularization procedure unless one of the other criteria for ischemic heart disease is present) no     H/O heart failure yes  H/O CVA no   H/O DM treated with insulin no  Preoperative serum creatinine >2.0 mg/dl (177 micromol/L) no     The calculated rate of cardiac death, nonfatal myocardial infarction, and nonfatal cardiac arrest according to the number of predictors is; One risk factor 1.0% ( 95% CI: 0.5-1.4)     he is considered a low risk for surgery. Anticoagulation can be held prior to surgery at the discretion of the surgeon. Current recommendations are to hold 24-48 hours prior to surgery. It should be resumed as soon as possible if held. Return in about 6 months (around 1/27/2021) for With physican. An  electronic signature was used to authenticate this note. --JETT Pitt - CNP on 7/27/2020 at 2:16 PM      I confirm that this visit was completed in a telehealth setting ,using synchronous audiovisual technology for real time patient interaction . The patient identity with name and date of birth was confirmed . This evaluation of patient was done by telehealth in the setting of [address] , which precluded assurance of safe in person visit at the time of service. The patient consented to and accepts potential risks associated with telemedical evaluation and care was taken to assess philip presence of any medical issues that would be more  appropriate for expedited in -person care. Pursuant to the emergency declaration under the [address] [place] [address] waiver authority and the [employer] Act, this Virtual  Visit was conducted, with patient's consent, to reduce the patient's risk of exposure to COVID-19 and provide continuity of care for an established patient. Services were provided through a video synchronous discussion virtually to substitute for in-person clinic visit. I Affirm this is a Patient Initiated Episode with an Established Patient who has not had a related appointment within my department in the past 7 days or scheduled within the next 24 hours.     Total Time: minutes: 21-30 minutes

## 2020-08-12 ENCOUNTER — TELEPHONE (OUTPATIENT)
Dept: CARDIOLOGY CLINIC | Age: 70
End: 2020-08-12

## 2020-08-12 NOTE — TELEPHONE ENCOUNTER
Patient called stated that he had a knee   Replacement 8/4 , he is having a issue  At a spot on his knee bleeding   He is on eliquis , He is going to reach   Out to his surgeon as well

## 2020-08-26 PROBLEM — Z01.810 PREOP CARDIOVASCULAR EXAM: Status: RESOLVED | Noted: 2018-06-05 | Resolved: 2020-08-26

## 2020-09-27 PROCEDURE — 93296 REM INTERROG EVL PM/IDS: CPT | Performed by: INTERNAL MEDICINE

## 2020-09-27 PROCEDURE — 93297 REM INTERROG DEV EVAL ICPMS: CPT | Performed by: INTERNAL MEDICINE

## 2020-09-27 PROCEDURE — 93295 DEV INTERROG REMOTE 1/2/MLT: CPT | Performed by: INTERNAL MEDICINE

## 2020-09-29 RX ORDER — DOFETILIDE 0.25 MG/1
CAPSULE ORAL
Qty: 60 CAPSULE | Refills: 3 | Status: SHIPPED | OUTPATIENT
Start: 2020-09-29 | End: 2021-01-19

## 2020-10-01 ENCOUNTER — PROCEDURE VISIT (OUTPATIENT)
Dept: CARDIOLOGY CLINIC | Age: 70
End: 2020-10-01
Payer: MEDICARE

## 2020-11-03 PROBLEM — I48.91 ATRIAL FIBRILLATION (HCC): Status: RESOLVED | Noted: 2020-11-03 | Resolved: 2020-11-03

## 2020-11-23 ENCOUNTER — OFFICE VISIT (OUTPATIENT)
Dept: PULMONOLOGY | Age: 70
End: 2020-11-23
Payer: MEDICARE

## 2020-11-23 VITALS
OXYGEN SATURATION: 97 % | WEIGHT: 266 LBS | HEART RATE: 77 BPM | HEIGHT: 70 IN | BODY MASS INDEX: 38.08 KG/M2 | SYSTOLIC BLOOD PRESSURE: 122 MMHG | DIASTOLIC BLOOD PRESSURE: 72 MMHG

## 2020-11-23 PROCEDURE — G8417 CALC BMI ABV UP PARAM F/U: HCPCS | Performed by: INTERNAL MEDICINE

## 2020-11-23 PROCEDURE — 99213 OFFICE O/P EST LOW 20 MIN: CPT | Performed by: INTERNAL MEDICINE

## 2020-11-23 PROCEDURE — 3017F COLORECTAL CA SCREEN DOC REV: CPT | Performed by: INTERNAL MEDICINE

## 2020-11-23 PROCEDURE — G8926 SPIRO NO PERF OR DOC: HCPCS | Performed by: INTERNAL MEDICINE

## 2020-11-23 PROCEDURE — 1123F ACP DISCUSS/DSCN MKR DOCD: CPT | Performed by: INTERNAL MEDICINE

## 2020-11-23 PROCEDURE — 1036F TOBACCO NON-USER: CPT | Performed by: INTERNAL MEDICINE

## 2020-11-23 PROCEDURE — 3023F SPIROM DOC REV: CPT | Performed by: INTERNAL MEDICINE

## 2020-11-23 PROCEDURE — G8484 FLU IMMUNIZE NO ADMIN: HCPCS | Performed by: INTERNAL MEDICINE

## 2020-11-23 PROCEDURE — 4040F PNEUMOC VAC/ADMIN/RCVD: CPT | Performed by: INTERNAL MEDICINE

## 2020-11-23 PROCEDURE — G8427 DOCREV CUR MEDS BY ELIG CLIN: HCPCS | Performed by: INTERNAL MEDICINE

## 2020-11-23 NOTE — PROGRESS NOTES
SUBJECTIVE:  Chief Complaint: Obstructive sleep apnea, history of mild COPD  Mr. Ambar Berman continues to wear CPAP around 7 hours per night and continues get an excellent clinical benefit with less daytime sleepiness and improve daytime energy. He mentions that he had his right knee surgery this summer and tolerated quite well. He has had no episodes of bronchitis since I last saw him. He is not on bronchodilator therapy. He continues to get new facemask and CPAP equipment every 6 months. ROS:  Constitution:  HEENT: Negative for ear, throat pain  Cardiovascular: Negative for chest pain, syncope, edema  Pulmonary: See HPI  Musculoskeletal: Negative for DVT, myalgias, arthralgias    OBJECTIVE:  /72   Pulse 77   Ht 5' 10\" (1.778 m)   Wt 266 lb (120.7 kg)   SpO2 97%   BMI 38.17 kg/m²      Physical Exam:  Constitutional:  He appears well developed and well-nourished. Moderately overweight  Neck:  Supple, No palpable lymphadenopathy, No JVD  Cardiovascular:  S1, S2 Normal, Regular rhythm, no murmurs or gallops, No pericardial  rubs. Pulmonary: Breath sounds are clear throughout all areas without wheezing or rhonchi  Abdomen: Not examined  Extremities: no edema, No DVT  Neurologic: Oriented x3, No focal deficits    Radiology: Chest x-ray on 4/2/2018 showed COPD with no acute cardiopulmonary abnormality  PFT: Office spirometry on 12/11/2018 demonstrated no significant airways obstruction with a borderline response to bronchodilators      Echocardiogram: 4/1/2019   LV function and size are normal, Ejection Fraction low normal 50-55%. Mild concentric left ventricular hypertrophy. No regional wall motion abnormalities were detected. Normal diastolic filling pattern for age. Mildly dilated left atrium. Mild mitral and tricuspid regurgitation is present. Pacemaker/AICD lead(s) was(were) visualized within the RA/RV cavity. RVSP= 33 mmHg. No evidence of pericardial effusion  ASSESSMENT:    1. Obstructive sleep apnea    2. Chronic bronchitis, unspecified chronic bronchitis type ([facility]          PLAN:   I will make no change in his current therapy. I did encourage him to continue wearing CPAP nightly. If he becomes more symptomatic with recurrent episodes of bronchitis I would consider bronchodilator therapy but not at this time. If he becomes more short of breath I will repeat his pulmonary function test.  I will continue to follow him    We have discussed the need to maintain yearly flu immunization, pneumococcal vaccination. We have discussed Coronavirus precaution including handwashing practice, wiping items touched in public such as gas pumps, door handles, shopping carts, etc. Self monitoring for infection - fever, chills, cough, SOB. Should they develop symptoms they should call office for further instructions. Return in about 1 year (around 11/23/2021) for Recheck for Obstructive Sleep Apnea, Recheck for COPD. This dictation was performed with a verbal recognition program and it was checked for errors. It is possible that there are still dictated errors within this office note. Any errors should be brought immediately to my attention for correction. All efforts were made to ensure that this office note is accurate.

## 2021-01-29 ENCOUNTER — OFFICE VISIT (OUTPATIENT)
Dept: CARDIOLOGY CLINIC | Age: 71
End: 2021-01-29
Payer: MEDICARE

## 2021-01-29 ENCOUNTER — TELEPHONE (OUTPATIENT)
Dept: CARDIOLOGY CLINIC | Age: 71
End: 2021-01-29

## 2021-01-29 VITALS
BODY MASS INDEX: 41.23 KG/M2 | DIASTOLIC BLOOD PRESSURE: 68 MMHG | HEIGHT: 70 IN | WEIGHT: 288 LBS | HEART RATE: 73 BPM | SYSTOLIC BLOOD PRESSURE: 116 MMHG

## 2021-01-29 DIAGNOSIS — I10 HTN (HYPERTENSION), BENIGN: ICD-10-CM

## 2021-01-29 DIAGNOSIS — Z95.810 ICD (IMPLANTABLE CARDIOVERTER-DEFIBRILLATOR), BIVENTRICULAR, IN SITU: Primary | ICD-10-CM

## 2021-01-29 PROCEDURE — G8417 CALC BMI ABV UP PARAM F/U: HCPCS | Performed by: INTERNAL MEDICINE

## 2021-01-29 PROCEDURE — 99214 OFFICE O/P EST MOD 30 MIN: CPT | Performed by: INTERNAL MEDICINE

## 2021-01-29 PROCEDURE — 3017F COLORECTAL CA SCREEN DOC REV: CPT | Performed by: INTERNAL MEDICINE

## 2021-01-29 PROCEDURE — 1036F TOBACCO NON-USER: CPT | Performed by: INTERNAL MEDICINE

## 2021-01-29 PROCEDURE — G8427 DOCREV CUR MEDS BY ELIG CLIN: HCPCS | Performed by: INTERNAL MEDICINE

## 2021-01-29 PROCEDURE — 93284 PRGRMG EVAL IMPLANTABLE DFB: CPT | Performed by: INTERNAL MEDICINE

## 2021-01-29 PROCEDURE — 4040F PNEUMOC VAC/ADMIN/RCVD: CPT | Performed by: INTERNAL MEDICINE

## 2021-01-29 PROCEDURE — 93290 INTERROG DEV EVAL ICPMS IP: CPT | Performed by: INTERNAL MEDICINE

## 2021-01-29 PROCEDURE — G8484 FLU IMMUNIZE NO ADMIN: HCPCS | Performed by: INTERNAL MEDICINE

## 2021-01-29 PROCEDURE — 1123F ACP DISCUSS/DSCN MKR DOCD: CPT | Performed by: INTERNAL MEDICINE

## 2021-01-29 RX ORDER — DOFETILIDE 0.25 MG/1
CAPSULE ORAL
Qty: 60 CAPSULE | Refills: 3 | Status: SHIPPED | OUTPATIENT
Start: 2021-01-29 | End: 2021-05-17

## 2021-01-29 NOTE — LETTER
Cardiology [address]  Phone: [phone]  Fax: [phone]    1/29/2021        Jonathan Michaels  [address] [place] [id number]            Dear Christopher Rosales: This is your Carelink schedule. You can justo your calendar with these dates. Remember that your device is wireless and should automatically do these checks while you are sleeping. If for any reason I do not get your transmission then I will call you and ask that you send a manual transmission. If you have any questions or concerns, please call and ask for James Arana at [phone]. Thank you.

## 2021-01-29 NOTE — PROGRESS NOTES
Ashutosh Lopes  is a  Established patient  ,78 y.o.   male here for evaluation of the following chief complaint(s):    DCM        SUBJECTIVE/OBJECTIVE:  HPI : h/o  DCM, a fib, ICD now here  Has no comaplins    Review of Systems    neg    Vitals:    01/29/21 0915   BP: 116/68   Pulse: 73   Weight: 288 lb (130.6 kg)   Height: 5' 10\" (1.778 m)     /68   Pulse 73   Ht 5' 10\" (1.778 m)   Wt 288 lb (130.6 kg)   BMI 41.32 kg/m²   Patient-Reported Vitals 7/27/2020   Patient-Reported Weight 268 lbs   Patient-Reported Height 5' 10\"   Patient-Reported Systolic 554   Patient-Reported Diastolic 78   Patient-Reported Pulse 71   Patient-Reported Temperature 97.7     Wt Readings from Last 3 Encounters:   01/29/21 288 lb (130.6 kg)   11/23/20 266 lb (120.7 kg)   02/04/20 266 lb 12.8 oz (121 kg)     Body mass index is 41.32 kg/m². Physical Exam     Neck: JVD      Lungs : clear    Cardio : Si and S2 audilble      Ext: edema      All pertinent data reviewed      Meds : reviewed         Tests ordered    ICD check    ASSESSMENT/PLAN:    - DCM  On meds    - Atrial fibrillation, pt is  compliant with meds. Patient does not have symptoms from atrial fibrillation    - ICD: carelink    · ICD Analysis:    ICD analysis is reviewed and filed in the device chart. Analysis is consistent with normal  function with stable leads and appropriate battery status for the age of the device. No therapies detected. Programming parameters are for the optimum function for this device in this patient. Patient is using pacer function A pace58%, V [id number]%. Optivolnormal    Recommend every three month device check and follow up office visit as scheduled. Kwadwo Solis MD, 1/29/2021 9:21 AM            - NSVT   Stable    - ANTOINE on cpap    - Atrial fibrillation, pt is  compliant with meds. Patient does not have symptoms from atrial fibrillation\    Mortality from the morbid obesity is very high: Body mass index is 41.32 kg/m².           An electronic signature was used to authenticate this note.     --Jourdan Harding MD

## 2021-05-06 ENCOUNTER — PROCEDURE VISIT (OUTPATIENT)
Dept: CARDIOLOGY CLINIC | Age: 71
End: 2021-05-06
Payer: MEDICARE

## 2021-05-06 VITALS — HEART RATE: 74 BPM | SYSTOLIC BLOOD PRESSURE: 124 MMHG | DIASTOLIC BLOOD PRESSURE: 72 MMHG

## 2021-05-06 DIAGNOSIS — Z95.810 ICD (IMPLANTABLE CARDIOVERTER-DEFIBRILLATOR), BIVENTRICULAR, IN SITU: Primary | ICD-10-CM

## 2021-05-06 PROCEDURE — 93290 INTERROG DEV EVAL ICPMS IP: CPT | Performed by: INTERNAL MEDICINE

## 2021-05-06 PROCEDURE — 93284 PRGRMG EVAL IMPLANTABLE DFB: CPT | Performed by: INTERNAL MEDICINE

## 2021-05-07 NOTE — PROCEDURES
Amira Azar  1950  Hannah Carolina MD  Chief Complaint   Patient presents with    Procedure     ICD check     /72   Pulse 74     ICD analysis is consistent with normal Medtronic CRT ICD function with stable leads and appropriate battery status for the age of the device. No therapies detected since last check on January 29, 2021. Programming parameters are for the optimum function for this device in this patient. Device is programmed to DDDR mode lower rate of 70 bpm and 99.3% pacing in both ventricles. OptiVol is stable suggesting well compensated CHF status. Remaining device longevity is 5 months. Recommend every three month device check and follow up office visit as scheduled.     Judie Baires MD, 5/7/2021 4:50 PM

## 2021-05-17 RX ORDER — DOFETILIDE 0.25 MG/1
CAPSULE ORAL
Qty: 60 CAPSULE | Refills: 3 | Status: SHIPPED | OUTPATIENT
Start: 2021-05-17 | End: 2021-09-13

## 2021-08-09 ENCOUNTER — OFFICE VISIT (OUTPATIENT)
Dept: CARDIOLOGY CLINIC | Age: 71
End: 2021-08-09
Payer: MEDICARE

## 2021-08-09 VITALS
HEIGHT: 70 IN | SYSTOLIC BLOOD PRESSURE: 116 MMHG | HEART RATE: 69 BPM | BODY MASS INDEX: 42.52 KG/M2 | DIASTOLIC BLOOD PRESSURE: 80 MMHG | WEIGHT: 297 LBS

## 2021-08-09 DIAGNOSIS — E78.2 MIXED HYPERLIPIDEMIA: ICD-10-CM

## 2021-08-09 DIAGNOSIS — G47.33 OBSTRUCTIVE SLEEP APNEA: ICD-10-CM

## 2021-08-09 DIAGNOSIS — I10 HTN (HYPERTENSION), BENIGN: ICD-10-CM

## 2021-08-09 DIAGNOSIS — Z86.79 S/P ABLATION OF ATRIAL FIBRILLATION: ICD-10-CM

## 2021-08-09 DIAGNOSIS — Z98.890 S/P ABLATION OF ATRIAL FIBRILLATION: ICD-10-CM

## 2021-08-09 DIAGNOSIS — E66.01 CLASS 3 SEVERE OBESITY DUE TO EXCESS CALORIES WITH SERIOUS COMORBIDITY AND BODY MASS INDEX (BMI) OF 40.0 TO 44.9 IN ADULT (HCC): ICD-10-CM

## 2021-08-09 DIAGNOSIS — Z95.810 ICD (IMPLANTABLE CARDIOVERTER-DEFIBRILLATOR), BIVENTRICULAR, IN SITU: Primary | ICD-10-CM

## 2021-08-09 PROCEDURE — 1123F ACP DISCUSS/DSCN MKR DOCD: CPT | Performed by: NURSE PRACTITIONER

## 2021-08-09 PROCEDURE — 4040F PNEUMOC VAC/ADMIN/RCVD: CPT | Performed by: NURSE PRACTITIONER

## 2021-08-09 PROCEDURE — G8417 CALC BMI ABV UP PARAM F/U: HCPCS | Performed by: NURSE PRACTITIONER

## 2021-08-09 PROCEDURE — G8427 DOCREV CUR MEDS BY ELIG CLIN: HCPCS | Performed by: NURSE PRACTITIONER

## 2021-08-09 PROCEDURE — 1036F TOBACCO NON-USER: CPT | Performed by: NURSE PRACTITIONER

## 2021-08-09 PROCEDURE — 99214 OFFICE O/P EST MOD 30 MIN: CPT | Performed by: NURSE PRACTITIONER

## 2021-08-09 PROCEDURE — 3017F COLORECTAL CA SCREEN DOC REV: CPT | Performed by: NURSE PRACTITIONER

## 2021-08-09 ASSESSMENT — ENCOUNTER SYMPTOMS
COUGH: 0
SHORTNESS OF BREATH: 0

## 2021-08-09 NOTE — LETTER
Dr. Corinne Noble  1950  [id number]    Have you had any Chest Pain that is not new? - No    Have you had any Shortness of Breath - No    Have you had any dizziness - No    Have you had any palpitations that are not new?  - No    Do you have any edema - swelling in No      Do you have a surgery or procedure scheduled in the near future - No

## 2021-08-09 NOTE — PROGRESS NOTES
[address]aldron [address]  Phone: [phone]    Fax [phone]    Cem Erwin MD, Kathy Metzger MD, [address], MD, MD Lennie Avendano MD Paulett Kohler, MD Ronie Euler, MD Harmony López, APRN      Carlitos Marrow, APRN  Treasure Merritt, APRN     Hossein Salas, APRN    CARDIOLOGY  NOTE    2021    Sean Watson (:  1950) is a 73 y.o. male,Established patient with Dr. Hector Eckert, here for evaluation of the following chief complaint(s):  6 Month Follow-Up    SUBJECTIVE/OBJECTIVE:    Patient is her to follow up on the following:  ICD (implantable cardioverter-defibrillator), biventricular, in situ  (primary encounter diagnosis)  HTN (hypertension), benign  Mixed hyperlipidemia  S/P ablation of atrial fibrillation  Obstructive sleep apnea  Class 3 severe obesity due to excess calories with serious comorbidity and body mass index (BMI) of 40.0 to 44.9 in adult [facility])    Patient states that he is feeling well. Patient is a former smoker. Patient denies issues obtaining or taking medications. Patient is active and does not do organized exercise. He golfs 2 days a week, but uses a golf cart frequently. Patient denies chest pain, shortness of breath, palpitations, dizziness, orthopnea, lower leg swelling, or syncope. Review of Systems   Constitutional: Negative for fatigue and fever. Respiratory: Negative for cough and shortness of breath. Cardiovascular: Negative for chest pain, palpitations and leg swelling. Musculoskeletal: Negative for arthralgias and gait problem. Neurological: Negative for dizziness, syncope, weakness, light-headedness and headaches.        Vitals:    21 1322   BP: 116/80   Pulse: 69   Weight: 297 lb (134.7 kg)   Height: 5' 10\" (1.778 m)       Wt Readings from Last 3 Encounters:   21 297 lb (134.7 kg)   21 288 lb (130.6 kg)   20 266 lb (120.7 kg) BP Readings from Last 3 Encounters:   08/09/21 116/80   05/06/21 124/72   01/29/21 116/68       Prior to Admission medications    Medication Sig Start Date End Date Taking? Authorizing Provider   apixaban (ELIQUIS) 5 MG TABS tablet Take 1 tablet by mouth 2 times daily 5/17/21  Yes Daniela Huitron MD   dofetilide (TIKOSYN) 250 MCG capsule TAKE 1 CAPSULE BY MOUTH EVERY 12 HOURS 5/17/21  Yes Daniela Huitron MD   hydroCHLOROthiazide (HYDRODIURIL) 25 MG tablet Take 25 mg by mouth daily   Yes Historical Provider, MD   lisinopril (PRINIVIL;ZESTRIL) 20 MG tablet TAKE 1 TABLET BY MOUTH TWICE DAILY 8/13/19  Yes JETT Martines CNP   allopurinol (ZYLOPRIM) 300 MG tablet Take 300 mg by mouth daily   Yes Historical Provider, MD   Multiple Vitamins-Minerals (MULTIVITAMIN PO) Take by mouth every morning Over The Counter   Yes Historical Provider, MD   CPAP Machine MISC nightly    Yes Historical Provider, MD   carvedilol (COREG) 25 MG tablet Take 1 tablet by mouth 2 times daily (with meals). 4/20/15  Yes Jolanta Bowers MD       Physical Exam  Vitals reviewed. Constitutional:       General: He is not in acute distress. Appearance: Normal appearance. He is obese. He is not ill-appearing. HENT:      Head: Atraumatic. Neck:      Vascular: No carotid bruit. Cardiovascular:      Rate and Rhythm: Normal rate and regular rhythm. Pulses: Normal pulses. Heart sounds: Normal heart sounds. No murmur heard. Pulmonary:      Effort: Pulmonary effort is normal. No respiratory distress. Breath sounds: Normal breath sounds. Musculoskeletal:         General: No swelling or deformity. Cervical back: Neck supple. No muscular tenderness. Neurological:      Mental Status: He is alert.          Health Maintenance   Topic Date Due    Hepatitis C screen  Never done    COVID-19 Vaccine (1) Never done    DTaP/Tdap/Td vaccine (1 - Tdap) Never done    Diabetes screen  Never done    Colon cancer screen colonoscopy  Never done    Shingles Vaccine (1 of 2) Never done    Pneumococcal 65+ years Vaccine (1 of 1 - PPSV23) Never done    Lipid screen  02/05/2019    Annual Wellness Visit (AWV)  Never done    Potassium monitoring  08/09/2020    Creatinine monitoring  08/09/2020    Flu vaccine (1) 09/01/2021    AAA screen  Completed    Hepatitis A vaccine  Aged Out    Hepatitis B vaccine  Aged Out    Hib vaccine  Aged Out    Meningococcal (ACWY) vaccine  Aged Out       Lab Review   Lab Results   Component Value Date    CHOL 134 02/05/2014    TRIG 104 02/05/2014    HDL 31 02/05/2014    LDLDIRECT 93 02/05/2014        Echocardiogram: 4/1/2019  Summary   LV function and size are normal, Ejection Fraction low normal 50-55%. Mild concentric left ventricular hypertrophy. No regional wall motion abnormalities were detected. Normal diastolic filling pattern for age. Mildly dilated left atrium. Mild mitral and tricuspid regurgitation is present. Pacemaker/AICD lead(s) was(were) visualized within the RA/RV cavity. RVSP= 33 mmHg. No evidence of pericardial effusion. Stress test: 12/4/2013    LH: 2/9/2014    PPM interrogation Randy Will MD, 5/7/2021 4:50 PM   ICD analysis is consistent with normal Medtronic CRT ICD function with stable leads and appropriate battery status for the age of the device. No therapies detected since last check on January 29, 2021. Programming parameters are for the optimum function for this device in this patient. Device is programmed to DDDR mode lower rate of 70 bpm and 99.3% pacing in both ventricles. OptiVol is stable suggesting well compensated CHF status. Remaining device longevity is 5 months. ASSESSMENT/PLAN:    1. ICD (implantable cardioverter-defibrillator), biventricular, in situ  Will have Dr. Mt Mccray see for gen change  -     Ambulatory referral to Cardiology  2. HTN (hypertension), benign  Stable  Continue lisinopril, HCTZ, and coreg  3.  Mixed hyperlipidemia  No lipids on file. 4. S/P ablation of atrial fibrillation  No significant issues with afib on PPM report  Continue tikosyn, coreg, eliquis 5 mg BID due to age < [de-identified] and weight > 60 kg.   5. Obstructive sleep apnea  Uses CPAP nightly. Feels like it is well controlled  Sees Dr. Lashae Grey   6. Class 3 severe obesity due to excess calories with serious comorbidity and body mass index (BMI) of 40.0 to 44.9 in [facility])  Encourage to exercise. 2 times a week, golf. 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 Return in about 6 months (around 2/9/2022) for with Dr. Lennox Mars, or maxime if needed. An electronic signature was used to authenticate this note.     Electronically signed by JETT Hernandez CNP on 8/9/2021 at 1:49 PM

## 2021-08-09 NOTE — PATIENT INSTRUCTIONS
Please be informed that if you contact our office outside of normal business hours the physician on call cannot help with any scheduling or rescheduling issues, procedure instruction questions or any type of medication issue. We advise you for any urgent/emergency that you go to the nearest emergency room!     PLEASE CALL OUR OFFICE DURING NORMAL BUSINESS HOURS    Monday - Friday   8 am to 5 pm    [address]: [phone]    Mary Alice:  [phone]

## 2021-09-08 ENCOUNTER — OFFICE VISIT (OUTPATIENT)
Dept: CARDIOLOGY CLINIC | Age: 71
End: 2021-09-08
Payer: MEDICARE

## 2021-09-08 VITALS
WEIGHT: 296.4 LBS | BODY MASS INDEX: 42.43 KG/M2 | OXYGEN SATURATION: 97 % | SYSTOLIC BLOOD PRESSURE: 118 MMHG | RESPIRATION RATE: 12 BRPM | HEIGHT: 70 IN | HEART RATE: 72 BPM | DIASTOLIC BLOOD PRESSURE: 80 MMHG

## 2021-09-08 DIAGNOSIS — I48.91 LONE ATRIAL FIBRILLATION (HCC): ICD-10-CM

## 2021-09-08 DIAGNOSIS — I48.92 ATRIAL FLUTTER, UNSPECIFIED TYPE (HCC): Primary | ICD-10-CM

## 2021-09-08 PROCEDURE — 3017F COLORECTAL CA SCREEN DOC REV: CPT | Performed by: INTERNAL MEDICINE

## 2021-09-08 PROCEDURE — 99203 OFFICE O/P NEW LOW 30 MIN: CPT | Performed by: INTERNAL MEDICINE

## 2021-09-08 PROCEDURE — 1123F ACP DISCUSS/DSCN MKR DOCD: CPT | Performed by: INTERNAL MEDICINE

## 2021-09-08 PROCEDURE — 1036F TOBACCO NON-USER: CPT | Performed by: INTERNAL MEDICINE

## 2021-09-08 PROCEDURE — G8427 DOCREV CUR MEDS BY ELIG CLIN: HCPCS | Performed by: INTERNAL MEDICINE

## 2021-09-08 PROCEDURE — 4040F PNEUMOC VAC/ADMIN/RCVD: CPT | Performed by: INTERNAL MEDICINE

## 2021-09-08 PROCEDURE — G8417 CALC BMI ABV UP PARAM F/U: HCPCS | Performed by: INTERNAL MEDICINE

## 2021-09-08 NOTE — PROGRESS NOTES
Electrophysiology Reconsult Note      Chief complaint :   Atrial fibrillation    Referring physician:  Kedar Shields      Primary care physician: Madeleine Perez MD      History of Present Illness: This visit (9/8/2021)      Chief Complaint   Patient presents with    Atrial Fibrillation     Pt is here to discuss gen change. Pt denies chest pain, shortness of breath, dizziness, palpitations, and edema. Previous visit:(6/15/2018)      Chief Complaint   Patient presents with    Atrial Fibrillation     Ak-s/p cardioversion and Tikosyn 6/5. Patient denies CP, SOB, palpitations. Does report some dizziness when standing but this is occasional. Patient brought a list of his blood pressure readings. Previous visit: (5/25/2018      Chief Complaint   Patient presents with    Atrial Fibrillation     Ak-Discuss Afib on device. Patient denies CP, palpitations. Denies edema. Does report lightheadedness upon standing at times, but subsides as soon as he gets moving. Patient has an ablation 2014 and cardioversion 2015. Patient does report using CPAP. Patient reports not usually have symptoms of Afib. States he only did the first time. Previous visit:    Chief Complaint   Patient presents with    Follow Up After Procedure     Patient had KVNG and DCCV done 5/18/15. Patient states he does feel any different now when at rest than he did before DCCV. Denies chest pain, dizziness, shortness of breath, palpitations, and edema. He reports though he is able to do more garden work and is more active though.         Past Medical History:   Diagnosis Date    Atrial fibrillation [facility])     Cardiac pacemaker 2/19/2014    BIV ICD Serial # [id number] Moderl # [id number]    Cardiomyopathy ([facility]     COPD (chronic obstructive pulmonary disease) (HCC)     Sees Dr. Camron Schrader    Hematoma - postoperative 02/19/2014    Pseudo aneurism post op [address]  History of blood transfusion 2014    No Reaction To Blood Transfusion Received    History of cardiac cath 12/4/2013 12/13 EF30% mild LAD, CX     History of cardiovascular disorder 12/4/2013    EF 29%, mild ischemia RCA    History of cardioversion 12/6/2013 12/13 unsuccessful    History of cardioversion 5/18/15    [facility]  History of echocardiogram 04/01/2019    History of transesophageal echocardiography (KVNG) for monitoring 12/4/2013 12/13 no clots    Hyperlipidemia     Hypertelorism     Hypertension     Obesity     S/P ablation of atrial fibrillation 02/14/2014    for a-fib by dr. Tim Galvez S/P cardiac cath 2/5/14    Shortness of breath on exertion     Sleep apnea     Uses CPAP    Teeth missing     Upper And Lower    Wears glasses     Clyde teeth extracted     4 [place]de Teeth Extracted In Past       Past Surgical History:   Procedure Laterality Date    ATRIAL ABLATION SURGERY  2/21/2014    for a-fib    CARDIAC DEFIBRILLATOR PLACEMENT  02/19/2014    Medtronic Viva S CRT-D Defibrillator Implanted    CARDIOVERSION  05/18/2015    KVNG/Cardioversion    COLONOSCOPY  Early 2000's    DENTAL SURGERY      Teeth Extracted In Past    HERNIA REPAIR Right 11/10/2017    inguinal hernia repair with mesh/ robotic    KNEE ARTHROSCOPY Left 1999    TOTAL KNEE ARTHROPLASTY Left 08/2019    WISDOM TOOTH EXTRACTION      4 [place] Teeth Extracted In Past       Family History   Problem Relation Age of Onset    High Blood Pressure Brother     Heart Disease Brother     Cancer Mother         Cancer Unsure What Kind    Early Death Mother 44        Cancer Unsure What Kind    Heart Attack Father     Heart Disease Father         Heart Attack    Diabetes Father         reports that he quit smoking about 47 years ago. His smoking use included cigarettes and cigars. He started smoking about 51 years ago. He has a 4.00 pack-year smoking history.  He has never used smokeless tobacco. He reports current alcohol use. He reports that he does not use drugs. No Known Allergies    Current Outpatient Medications   Medication Sig Dispense Refill    Cholecalciferol (VITAMIN D3 PO) Take by mouth      apixaban (ELIQUIS) 5 MG TABS tablet Take 1 tablet by mouth 2 times daily 60 tablet 5    dofetilide (TIKOSYN) 250 MCG capsule TAKE 1 CAPSULE BY MOUTH EVERY 12 HOURS 60 capsule 3    hydroCHLOROthiazide (HYDRODIURIL) 25 MG tablet Take 25 mg by mouth daily      lisinopril (PRINIVIL;ZESTRIL) 20 MG tablet TAKE 1 TABLET BY MOUTH TWICE DAILY 180 tablet 1    allopurinol (ZYLOPRIM) 300 MG tablet Take 300 mg by mouth daily      Multiple Vitamins-Minerals (MULTIVITAMIN PO) Take by mouth every morning Over The Counter      CPAP Machine MISC nightly       carvedilol (COREG) 25 MG tablet Take 1 tablet by mouth 2 times daily (with meals). 60 tablet 5     No current facility-administered medications for this visit. Review of Systems:   Review of Systems   Constitutional: Denies fatigue. Negative for fever, chills and activity change. HENT: Negative for congestion, ear pain and tinnitus. Eyes: Negative for photophobia, pain and visual disturbance. Respiratory: shortness of breath improved. Negative for cough, chest tightness and wheezing. Cardiovascular: No palpitations. Negative for chest pain and leg swelling. Gastrointestinal: Negative for nausea, vomiting, abdominal pain, diarrhea, constipation and blood in stool. Endocrine: Negative for cold intolerance and heat intolerance. Genitourinary: Negative for dysuria, hematuria and flank pain. Musculoskeletal: Positive for arthralgias. Negative for myalgias, back pain and neck stiffness. Skin: Negative for color change and rash. Allergic/Immunologic: Negative for food allergies. Neurological:  Negative for  numbness and headaches. Occasional dizziness when he stands up. Hematological: Does not bruise/bleed easily.    Psychiatric/Behavioral: Negative for confusion and agitation. Physical Examination:    /80   Pulse 72   Resp 12   Ht 5' 10\" (1.778 m)   Wt 296 lb 6.4 oz (134.4 kg)   SpO2 97%   BMI 42.53 kg/m²    Wt Readings from Last 3 Encounters:   09/08/21 296 lb 6.4 oz (134.4 kg)   08/09/21 297 lb (134.7 kg)   01/29/21 288 lb (130.6 kg)     Body mass index is 42.53 kg/m². Physical Exam   Constitutional: He is oriented to person, place, and time and well-developed, well-nourished, and in no distress. HENT:   Head: Normocephalic and atraumatic. Eyes: Conjunctivae and EOM are normal. Pupils are equal, round, and reactive to light. Right eye exhibits no discharge. Neck: Normal range of motion. No JVD present. No thyromegaly present. Cardiovascular: Normal rate and regular rhythm. Exam reveals no friction rub. Murmur heard. Pulmonary/Chest: Effort normal and breath sounds normal. No stridor. No respiratory distress. He has no wheezes. Abdominal: Soft. Bowel sounds are normal. He exhibits no distension. There is no tenderness. Musculoskeletal: Normal range of motion. He exhibits no edema or tenderness. Neurological: He is alert and oriented to person, place, and time. No cranial nerve deficit. Skin: Skin is warm and dry. No rash noted. No erythema.    Psychiatric: Mood and affect normal.           CBC:   Lab Results   Component Value Date    WBC 9.0 08/09/2019    HGB 10.1 08/09/2019    HCT 32.4 08/09/2019     08/09/2019     Lipids:   Lab Results   Component Value Date    CHOL 134 02/05/2014    TRIG 104 02/05/2014    HDL 31 (L) 02/05/2014    LDLDIRECT 93 02/05/2014     PT/INR:   Lab Results   Component Value Date    INR 1.54 06/05/2018        BMP:    Lab Results   Component Value Date     08/09/2019    K 4.7 08/09/2019     08/09/2019    CO2 28 08/09/2019    BUN 12 08/09/2019     CMP:   Lab Results   Component Value Date    AST 25 04/02/2018    PROT 6.7 04/02/2018    BILITOT 0.6 04/02/2018    ALKPHOS 56 04/02/2018     TSH:    No results found for: TSH          IMPRESSION / RECOMMENDATIONS:     Atrial flutter persistent  Monitoring for tikosyn therapy  Persistent atrial fibrillation sp cardioversion and now in sinus BIV paced (previous ablation performed in outside facility )([facility])  CAD  Severe left ventricular systolic dysfunction SP BIV ICD  HTN  HLD      Patient is in atrial flutter with BiV paced. Patient heart rate is low so he is BiV paced and 99% of the time. Recommend cardioversion he just went into atrial arrhythmia couple of days ago.     Patient is on Tikosyn discussed with the patient and patient agreeable with the plan will plan for BiV ICD generator change after 2 months as still 2 months of battery life is left and patient device did not hit BRODIE yet    Discussed risks with procedure and patient agreeable with plan      Rodger Krishnan MD

## 2021-09-08 NOTE — PATIENT INSTRUCTIONS
COVID test, and sign consent form on 9/16/21 at the [facility]. Procedure scheduled with Katia Webb on 9/21.

## 2021-09-08 NOTE — LETTER
[facility]     Dr. Mireya Argueta     Transesophageal Echocardiogram with Cardioversion    Patient Name: Sean Watson  : 1950  MRN# [id number]     Date of Procedure: 21 Time: 1130 Arrival Time: [address]: [facility])     Call to [place] at [phone] 2 days before your procedure    X Please have COVID-19 Test done on 21 at the [place] [address]. You will need to Quarantine yourself until procedure. X Please do not have anything by mouth after midnight prior to or 8 hours before the procedure. X You may take your medication with a sip of water unless advised otherwise below. X Please continue to take Eliquis (apixaban) as directed. Patient Signature:____________________________     Staff Prepared: Jo Ann Hanna RN     Staff Given Instructions:_______________________________                                  [facility]  What is cardioversion? Cardioversion helps your heart return to a normal rhythm. It treats problems like atrial fibrillation. It is also sometimes used in emergencies. It can correct a fast heartbeat that causes low blood pressure, chest pain, or heart failure. Your doctor may ask you to take medicines before the treatment. These help prevent blood clots. Your doctor will watch you closely to make sure that there are no problems. Electrical cardioversion: The electrical procedure is done in a hospital. You will get medicine to help you relax and control the pain. Your doctor will put paddles or patches on your chest and back. These send an electric current to your heart. This resets your heart rhythm. The electrical part takes about 5 minutes. But you will probably be in the hospital for 1 to 2 hours. You will need to recover from the effects of the pain medicine.   Chemical cardioversion: The chemical procedure is most often done in a hospital. In most cases, the medicine is put into your arm through a tube called an IV. But you may get medicines to take by mouth. You may feel a quick sting or pinch when the IV starts. The procedure usually takes about 30 to 60 minutes for procedure. Transesophageal Echocardiogram  What is a transesophageal echocardiogram?  A transesophageal echocardiogram is a test to help your doctor look at the inside of your heart. A small device called a transducer directs sound waves toward your heart. The sound waves make a picture of the heart's valves and chambers. Your doctor may do this test to look for certain types of heart disease. Or it may be done to see how disease is affecting your heart. You will be given medicine to make you sleepy and comfortable during the test. The doctor puts a small, flexible tube into your throat and guides it to the esophagus. This is the tube that connects your mouth to your stomach. The doctor will ask you to swallow as the tube goes down. The transducer is at the tip of the tube. It gets close to your heart to make clear pictures. The doctor will look at the ultrasound pictures on a screen. You will not be able to eat or drink until the numbness from the throat spray wears off. Your throat may be sore for a few days after the test.  Follow-up care is a key part of your treatment and safety. Be sure to make and go to all appointments, and call your doctor if you are having problems. It's also a good idea to know your test results and keep a list of the medicines you take. IMPORTANT NOTICE TO PATIENTS: Prior Authorization  We will contact your insurance for prior authorization for the CPT code related to the procedure it is the patient's responsibility to contact their insurance to ensure they are following their medical plans provisions or requirements!                   [address]/CARDIOLOGY        Patient Name: Marily Wagner  : 1950 MRN# [id number]    Transesophageal Echocardiogram with Cardioversion    Day of Procedure Cath Lab Holding Area Preop Orders:    ? YOU HAVE MY PERMISSION TO TALK TO THE PATIENT-PLEASE    ? Anesthesia    ? KVNG with Anesthesia    ? IV peripheral saline lock  (preferred left arm). ? STAT LABS ON ARRIVAL: BMP, MAG, PHOS, CBC, PT INR, PTT    ? If taking Coumadin, PT INR  STAT on arrival day of procedure. ? 12 lead EKG STAT on arrival day of procedure. ? Diabetic patients >> Accu check Blood sugar check. ? Document home medications in EPIC and include date and time of last dose.    ? NPO.    ? Notify Dr. Purvi Ortez of abnormal lab results. ? Chest Prep> Clip hair anterior chest and posterior back. ? If Tikosyn or Sotalol loading  Planned >>3 [address] bed            Physician Signature:___________________Date:___________Time:____________                           *This consent is applicable for 30 days following patient signature*    PROCEDURAL INFORMED [address] / PROCEDURE    I (We), Napoleon Mcneil authorize, Dr. Katharine Mcgee   and such assistants as may be selected by him/her, to perform the following operation/procedures:  Transesophageal Echocardiogram with Cardioversion    Note: If unable to obtain consent prior to an emergent procedure, document the emergent reason in the medical record. This procedure has been explained to my (our) satisfaction and included in the explanation was:   A) the intended benefit, nature, and extent of the procedure to be performed;   B) the significant risks involved and the probability of success;   C) alternative procedures and methods of treatment;   D) the dangers and probable consequences of such alternatives (including no procedure or treatment);    E) the expected consequences of the procedure on my future health;   F) whether other qualified individuals would be performing important surgical tasks and / or whether  would be present to advise or support the procedure. I (we) understand that there are other risks of infection and other serious complications in the pre-operative/procedural and postoperative/procedural stages of my (our) care. I (we) have asked all of the questions which I (we) thought were important in deciding whether or not to undergo treatment or diagnosis. These questions have been answered to my (our) satisfaction. I (we) understand that no assurance can be given that the procedure will be a success, and no guarantee or warranty of success has been given to me (us). 2. It has been explained to me (us) that during the course of the operation/procedure, unforeseen conditions may be revealed that necessitate extension of the original procedure(s) or different procedure(s) than those set forth in Paragraph 1. I (we) authorize and request that the above-named physician, his/her assistants or his/her designees, perform procedures as necessary and desirable if deemed to be in my (our) best interest.     3. I acknowledge that other health care personnel may be observing this procedure for the purpose of medical education or other specified purposes as may be necessary as requested and/or approved by my (our) physician. 4. I (we) consent to the disposal by the hospital Pathologist of the removed tissue, parts or organs in accordance with hospital policy. 5. I do_____ do not______ consent to the use of a local infiltration pain blocking agent that will be used by my provider/surgical provider to help alleviate pain during my procedure. 6. I do_____ do not_____ consent to an emergent blood transfusion in the case of a life-threatening situation that requires blood components to be administered. This consent is valid for 24 hours from the beginning of the procedure. 7. This patient does _____ or does not ______currently have a DNR status/order.  If DNR order is in place, obtain \"Addendum to the Surgical

## 2021-09-13 RX ORDER — DOFETILIDE 0.25 MG/1
CAPSULE ORAL
Qty: 60 CAPSULE | Refills: 3 | Status: SHIPPED | OUTPATIENT
Start: 2021-09-13 | End: 2022-01-12

## 2021-09-16 ENCOUNTER — NURSE ONLY (OUTPATIENT)
Dept: CARDIOLOGY CLINIC | Age: 71
End: 2021-09-16
Payer: MEDICARE

## 2021-09-16 ENCOUNTER — HOSPITAL ENCOUNTER (OUTPATIENT)
Age: 71
Setting detail: SPECIMEN
Discharge: HOME | End: 2021-09-16
Payer: MEDICARE

## 2021-09-16 VITALS — SYSTOLIC BLOOD PRESSURE: 120 MMHG | DIASTOLIC BLOOD PRESSURE: 78 MMHG

## 2021-09-16 DIAGNOSIS — I48.91 LONE ATRIAL FIBRILLATION (HCC): ICD-10-CM

## 2021-09-16 PROCEDURE — 99211 OFF/OP EST MAY X REQ PHY/QHP: CPT | Performed by: INTERNAL MEDICINE

## 2021-09-16 PROCEDURE — U0003 INFECTIOUS AGENT DETECTION BY NUCLEIC ACID (DNA OR RNA); SEVERE ACUTE RESPIRATORY SYNDROME CORONAVIRUS 2 (SARS-COV-2) (CORONAVIRUS DISEASE [COVID-19]), AMPLIFIED PROBE TECHNIQUE, MAKING USE OF HIGH THROUGHPUT TECHNOLOGIES AS DESCRIBED BY CMS-2020-01-R: HCPCS

## 2021-09-16 PROCEDURE — U0005 INFEC AGEN DETEC AMPLI PROBE: HCPCS

## 2021-09-16 NOTE — PROGRESS NOTES
Patient informed of instructions and guidance for performing test.  Throat swab performed. Swab placed into labeled collection tube. Collection tube and lab order placed in plastic bag. Bag placed in biohazard bag and placed in fridge.  called for . Patient here in office and educated on KVNG/CV, schedule for 9/21/21 @ 1130, with arrival @ 0930, @ [facility]; risk explained; and consents signed. Instruction given to patient to :  NPO after midnight the night before procedure; call hospital at [phone] to pre-register. May take rest of morning meds of procedure Do not hold Eliquis Patient voiced understanding. Copies of consent & info scanned in chart.

## 2021-09-18 LAB
SARS-COV-2: NOT DETECTED
SOURCE: NORMAL

## 2021-09-21 ENCOUNTER — ANESTHESIA EVENT (OUTPATIENT)
Dept: NON INVASIVE DIAGNOSTICS | Age: 71
End: 2021-09-21

## 2021-09-21 ENCOUNTER — ANESTHESIA (OUTPATIENT)
Dept: NON INVASIVE DIAGNOSTICS | Age: 71
End: 2021-09-21

## 2021-09-21 ENCOUNTER — HOSPITAL ENCOUNTER (OUTPATIENT)
Dept: NON INVASIVE DIAGNOSTICS | Age: 71
Discharge: HOME | End: 2021-09-21
Payer: MEDICARE

## 2021-09-21 VITALS
DIASTOLIC BLOOD PRESSURE: 89 MMHG | SYSTOLIC BLOOD PRESSURE: 108 MMHG | OXYGEN SATURATION: 99 % | RESPIRATION RATE: 14 BRPM | HEART RATE: 79 BPM

## 2021-09-21 VITALS — DIASTOLIC BLOOD PRESSURE: 74 MMHG | SYSTOLIC BLOOD PRESSURE: 144 MMHG

## 2021-09-21 PROCEDURE — 6360000002 HC RX W HCPCS

## 2021-09-21 PROCEDURE — 93325 DOPPLER ECHO COLOR FLOW MAPG: CPT | Performed by: INTERNAL MEDICINE

## 2021-09-21 PROCEDURE — 93312 ECHO TRANSESOPHAGEAL: CPT

## 2021-09-21 PROCEDURE — 92960 CARDIOVERSION ELECTRIC EXT: CPT

## 2021-09-21 PROCEDURE — 7100000000 HC PACU RECOVERY - FIRST 15 MIN

## 2021-09-21 PROCEDURE — 93312 ECHO TRANSESOPHAGEAL: CPT | Performed by: INTERNAL MEDICINE

## 2021-09-21 PROCEDURE — 93005 ELECTROCARDIOGRAM TRACING: CPT | Performed by: INTERNAL MEDICINE

## 2021-09-21 PROCEDURE — 92960 CARDIOVERSION ELECTRIC EXT: CPT | Performed by: INTERNAL MEDICINE

## 2021-09-21 PROCEDURE — 7100000001 HC PACU RECOVERY - ADDTL 15 MIN

## 2021-09-21 PROCEDURE — 3700000000 HC ANESTHESIA ATTENDED CARE

## 2021-09-21 ASSESSMENT — ENCOUNTER SYMPTOMS: SHORTNESS OF BREATH: 1

## 2021-09-21 NOTE — PROGRESS NOTES
DISCHARGE INSTRUCTIONS DISCUSSED, PATIENT SIGNED AND COPIES GIVEN. DENIES ANY QUESTIONS OR CONCERNS.  IV DISCONTINUED WITHOUT ISSUES. ESCORTED TO PRIVATE VEHICLE WITH FAMILY PER WC.

## 2021-09-21 NOTE — PROCEDURES
Talmoon Homar   73 y.o., male  1950 9/21/2021    Attending: Evelyn Lord MD    Sedation : Anesthesia                        Indication:        Atrial flutter  / fibrillation                                                                                                                                               After obtaining the informed cosent. Pt brought to ep lab. Sedation per anesthesia . After proper sedation KVNG performed. Post procedure pt is stable.       Findings:  LV=  Low normal LVEF  LA=  dilated  MALLORIE= NO CLOTS  RV= icd lead noted, Appears mildly dilated  RA=  Pacer wire noted  IAS= PFO noted - could be from previous ablation  Bubble study=not done for PFO or ASD  AV=tricuspid, no significant regurgitation  MV=mild regurgitation  TV=mild regurgitation  PV=trivial regurgitation  Aorta=mild stable plaque noted  PUL CATHERINE FLOW=systolic blunting noted, In atrial arrhythmia    Impression:  No MALLORIE clot    Plan:  Proceed with cardioversion

## 2021-09-21 NOTE — H&P
Electrophysiology Reconsult Note      Chief complaint :   Atrial fibrillation    Referring physician:  Lui Schulz      Primary care physician: Fabio Chahal MD      History of Present Illness: Today visit (09/21/21)    Patient here for KVNG/Cardioversion. No change in H&P noted from previous clinic visit. Previous visit (9/8/2021)      Chief Complaint   Patient presents with    Atrial Fibrillation     Pt is here to discuss gen change. Pt denies chest pain, shortness of breath, dizziness, palpitations, and edema. Previous visit:(6/15/2018)      Chief Complaint   Patient presents with    Atrial Fibrillation     Ak-s/p cardioversion and Tikosyn 6/5. Patient denies CP, SOB, palpitations. Does report some dizziness when standing but this is occasional. Patient brought a list of his blood pressure readings. Previous visit: (5/25/2018      Chief Complaint   Patient presents with    Atrial Fibrillation     Ak-Discuss Afib on device. Patient denies CP, palpitations. Denies edema. Does report lightheadedness upon standing at times, but subsides as soon as he gets moving. Patient has an ablation 2014 and cardioversion 2015. Patient does report using CPAP. Patient reports not usually have symptoms of Afib. States he only did the first time. Previous visit:    Chief Complaint   Patient presents with    Follow Up After Procedure     Patient had KVNG and DCCV done 5/18/15. Patient states he does feel any different now when at rest than he did before DCCV. Denies chest pain, dizziness, shortness of breath, palpitations, and edema. He reports though he is able to do more garden work and is more active though.         Past Medical History:   Diagnosis Date    Atrial fibrillation [facility])     Cardiac pacemaker 2/19/2014    BIV ICD Serial # BL [id number] Moderl # [id number]    Cardiomyopathy [address] [address]     COPD (chronic obstructive pulmonary disease) ([facility] [address]     Sees Dr. Juhi Veira    Hematoma - postoperative 02/19/2014    Pseudo aneurism post op at [address] groin    History of blood transfusion 2014    No Reaction To Blood Transfusion Received    History of cardiac cath 12/4/2013 12/13 EF30% mild LAD, CX     History of cardiovascular disorder 12/4/2013    EF 29%, mild ischemia RCA    History of cardioversion 12/6/2013 12/13 unsuccessful    History of cardioversion 5/18/15    DCCV-.  History of echocardiogram 04/01/2019    History of transesophageal echocardiography (KVNG) for monitoring 12/4/2013 12/13 no clots    Hyperlipidemia     Hypertelorism     Hypertension     Obesity     S/P ablation of atrial fibrillation 02/14/2014    for a-fib by dr. Candelaria Gonsalez S/P cardiac cath 2/5/14    Shortness of breath on exertion     Sleep apnea     Uses CPAP    Teeth missing     Upper And Lower    Wears glasses     [place] teeth extracted     4 [place] Teeth Extracted In Past       Past Surgical History:   Procedure Laterality Date    ATRIAL ABLATION SURGERY  2/21/2014    for a-fib    CARDIAC DEFIBRILLATOR PLACEMENT  02/19/2014    Medtronic Viva S CRT-D Defibrillator Implanted    CARDIOVERSION  05/18/2015    KVNG/Cardioversion    COLONOSCOPY  Early 2000's    DENTAL SURGERY      Teeth Extracted In Past    HERNIA REPAIR Right 11/10/2017    inguinal hernia repair with mesh/ robotic    KNEE ARTHROSCOPY Left 1999    TOTAL KNEE ARTHROPLASTY Left 08/2019    WISDOM TOOTH EXTRACTION      4 [place] Teeth Extracted In Past       Family History   Problem Relation Age of Onset    High Blood Pressure Brother     Heart Disease Brother     Cancer Mother         Cancer Unsure What Kind    Early Death Mother 44        Cancer Unsure What Kind    Heart Attack Father     Heart Disease Father         Heart Attack    Diabetes Father         reports that he quit smoking about 47 years ago. His smoking use included cigarettes and cigars.  He started smoking about 51 years ago. He has a 4.00 pack-year smoking history. He has never used smokeless tobacco. He reports current alcohol use. He reports that he does not use drugs. No Known Allergies    Current Outpatient Medications   Medication Sig Dispense Refill    dofetilide (TIKOSYN) 250 MCG capsule TAKE 1 CAPSULE BY MOUTH EVERY 12 HOURS 60 capsule 3    Cholecalciferol (VITAMIN D3 PO) Take by mouth      apixaban (ELIQUIS) 5 MG TABS tablet Take 1 tablet by mouth 2 times daily 60 tablet 5    hydroCHLOROthiazide (HYDRODIURIL) 25 MG tablet Take 25 mg by mouth daily      lisinopril (PRINIVIL;ZESTRIL) 20 MG tablet TAKE 1 TABLET BY MOUTH TWICE DAILY 180 tablet 1    allopurinol (ZYLOPRIM) 300 MG tablet Take 300 mg by mouth daily      Multiple Vitamins-Minerals (MULTIVITAMIN PO) Take by mouth every morning Over The Counter      CPAP Machine MISC nightly       carvedilol (COREG) 25 MG tablet Take 1 tablet by mouth 2 times daily (with meals). 60 tablet 5     No current facility-administered medications for this encounter. Review of Systems:   Review of Systems   Constitutional: Denies fatigue. Negative for fever, chills and activity change. HENT: Negative for congestion, ear pain and tinnitus. Eyes: Negative for photophobia, pain and visual disturbance. Respiratory: shortness of breath improved. Negative for cough, chest tightness and wheezing. Cardiovascular: No palpitations. Negative for chest pain and leg swelling. Gastrointestinal: Negative for nausea, vomiting, abdominal pain, diarrhea, constipation and blood in stool. Endocrine: Negative for cold intolerance and heat intolerance. Genitourinary: Negative for dysuria, hematuria and flank pain. Musculoskeletal: Positive for arthralgias. Negative for myalgias, back pain and neck stiffness. Skin: Negative for color change and rash. Allergic/Immunologic: Negative for food allergies.    Neurological:  Negative for  numbness and headaches. Occasional dizziness when he stands up. Hematological: Does not bruise/bleed easily. Psychiatric/Behavioral: Negative for confusion and agitation. Physical Examination:    /89   Pulse 82   Resp 22   SpO2 98%    Wt Readings from Last 3 Encounters:   09/08/21 296 lb 6.4 oz (134.4 kg)   08/09/21 297 lb (134.7 kg)   01/29/21 288 lb (130.6 kg)     There is no height or weight on file to calculate BMI. Physical Exam   Constitutional: He is oriented to person, place, and time and well-developed, well-nourished, and in no distress. HENT:   Head: Normocephalic and atraumatic. Eyes: Conjunctivae and EOM are normal. Pupils are equal, round, and reactive to light. Right eye exhibits no discharge. Neck: Normal range of motion. No JVD present. No thyromegaly present. Cardiovascular: Normal rate and regular rhythm. Exam reveals no friction rub. Murmur heard. Pulmonary/Chest: Effort normal and breath sounds normal. No stridor. No respiratory distress. He has no wheezes. Abdominal: Soft. Bowel sounds are normal. He exhibits no distension. There is no tenderness. Musculoskeletal: Normal range of motion. He exhibits no edema or tenderness. Neurological: He is alert and oriented to person, place, and time. No cranial nerve deficit. Skin: Skin is warm and dry. No rash noted. No erythema.    Psychiatric: Mood and affect normal.           CBC:   Lab Results   Component Value Date    WBC 9.0 08/09/2019    HGB 10.1 08/09/2019    HCT 32.4 08/09/2019     08/09/2019     Lipids:   Lab Results   Component Value Date    CHOL 134 02/05/2014    TRIG 104 02/05/2014    HDL 31 (L) 02/05/2014    LDLDIRECT 93 02/05/2014     PT/INR:   Lab Results   Component Value Date    INR 1.54 06/05/2018        BMP:    Lab Results   Component Value Date     08/09/2019    K 4.7 08/09/2019     08/09/2019    CO2 28 08/09/2019    BUN 12 08/09/2019     CMP:   Lab Results   Component Value Date AST 25 04/02/2018    PROT 6.7 04/02/2018    BILITOT 0.6 04/02/2018    ALKPHOS 56 04/02/2018     TSH:    No results found for: TSH          IMPRESSION / RECOMMENDATIONS:     Atrial flutter persistent  Monitoring for tikosyn therapy  Persistent atrial fibrillation sp cardioversion and now in sinus BIV paced (previous ablation performed in outside facility )([facility])  CAD  Severe left ventricular systolic dysfunction SP BIV ICD  HTN  HLD      Patient is in atrial flutter with BiV paced. Patient heart rate is low so he is BiV paced and 99% of the time. Recommend cardioversion he just went into atrial arrhythmia couple of days ago.     Patient is on Tikosyn discussed with the patient and patient agreeable with the plan will plan for BiV ICD generator change after 2 months as still 2 months of battery life is left and patient device did not hit BRODIE yet    Discussed risks with procedure and patient agreeable with plan      Shahbaz Gomez MD

## 2021-09-21 NOTE — ANESTHESIA POSTPROCEDURE EVALUATION
Department of Anesthesiology  Postprocedure Note    Patient: Neha Reaves  MRN: [id number]  YOB: 1950  Date of evaluation: 9/21/2021  Time:  12:19 PM     Procedure Summary     Date: 09/21/21 Room / Location: [facility] Lab    Anesthesia Start: 1207 Anesthesia Stop: 1218    Procedure: [facility] KVNG W/CARDIOVERSION Diagnosis:       Unspecified atrial fibrillation      Other specified postprocedural states    Scheduled Providers:  Responsible Provider: Lisy Dalton MD    Anesthesia Type: MAC ASA Status: 4          Anesthesia Type: MAC    May Phase I:      May Phase II:      Last vitals: Reviewed and per EMR flowsheets.        Anesthesia Post Evaluation    Patient location during evaluation: bedside  Patient participation: complete - patient participated  Level of consciousness: awake and alert  Pain score: 0  Airway patency: patent  Nausea & Vomiting: no nausea  Complications: no  Cardiovascular status: hemodynamically stable  Respiratory status: acceptable  Hydration status: euvolemic

## 2021-09-21 NOTE — ANESTHESIA PRE PROCEDURE
Department of Anesthesiology  Preprocedure Note       Name:  Paul Morrison   Age:  73 y.o.  :  1950                                          MRN:  [id number]         Date:  2021      Surgeon: * Surgery not found *    Procedure:     Medications prior to admission:   Prior to Admission medications    Medication Sig Start Date End Date Taking? Authorizing Provider   dofetilide (TIKOSYN) 250 MCG capsule TAKE 1 CAPSULE BY MOUTH EVERY 12 HOURS 21   Alfredo Hoskins MD   Cholecalciferol (VITAMIN D3 PO) Take by mouth    Historical Provider, MD   apixaban (ELIQUIS) 5 MG TABS tablet Take 1 tablet by mouth 2 times daily 21   Alfredo Hoskins MD   hydroCHLOROthiazide (HYDRODIURIL) 25 MG tablet Take 25 mg by mouth daily    Historical Provider, MD   lisinopril (PRINIVIL;ZESTRIL) 20 MG tablet TAKE 1 TABLET BY MOUTH TWICE DAILY 19   JETT Henson - CNP   allopurinol (ZYLOPRIM) 300 MG tablet Take 300 mg by mouth daily    Historical Provider, MD   Multiple Vitamins-Minerals (MULTIVITAMIN PO) Take by mouth every morning Over The Counter    Historical Provider, MD   CPAP Machine MISC nightly     Historical Provider, MD   carvedilol (COREG) 25 MG tablet Take 1 tablet by mouth 2 times daily (with meals).  4/20/15   Jessenia Cordova MD       Current medications:    Current Outpatient Medications   Medication Sig Dispense Refill    dofetilide (TIKOSYN) 250 MCG capsule TAKE 1 CAPSULE BY MOUTH EVERY 12 HOURS 60 capsule 3    Cholecalciferol (VITAMIN D3 PO) Take by mouth      apixaban (ELIQUIS) 5 MG TABS tablet Take 1 tablet by mouth 2 times daily 60 tablet 5    hydroCHLOROthiazide (HYDRODIURIL) 25 MG tablet Take 25 mg by mouth daily      lisinopril (PRINIVIL;ZESTRIL) 20 MG tablet TAKE 1 TABLET BY MOUTH TWICE DAILY 180 tablet 1    allopurinol (ZYLOPRIM) 300 MG tablet Take 300 mg by mouth daily      Multiple Vitamins-Minerals (MULTIVITAMIN PO) Take by mouth every morning Over The Counter      CPAP Machine MIS nightly       carvedilol (COREG) 25 MG tablet Take 1 tablet by mouth 2 times daily (with meals). 60 tablet 5     No current facility-administered medications for this encounter. 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    Obesity E66.9    Hyperlipidemia E78.5    ICD (implantable cardioverter-defibrillator), biventricular, in situ Z95.810    COPD (chronic obstructive pulmonary disease) ([facility] [address] J44.9    Obstructive sleep apnea G47.33    Right inguinal hernia K40.90    Personal history of other drug therapy (CODE) Z92.29    Unilateral inguinal hernia without obstruction or gangrene K40.90    Pacemaker Z95.0    S/P ablation of atrial fibrillation Z98.890, Z86.79       Past Medical History:        Diagnosis Date    Atrial fibrillation ([facility] [address]     Cardiac pacemaker 2/19/2014    BIV ICD Serial # BL [id number] Moderl # [id number]    Cardiomyopathy ([facility] [address]     COPD (chronic obstructive pulmonary disease) ([facility] [address]     Sees Dr. Sumner [place]    Hematoma - postoperative 02/19/2014    Pseudo aneurism post op at [address] groin    History of blood transfusion 2014    No Reaction To Blood Transfusion Received    History of cardiac cath 12/4/2013 12/13 EF30% mild LAD, CX     History of cardiovascular disorder 12/4/2013    EF 29%, mild ischemia RCA    History of cardioversion 12/6/2013 12/13 unsuccessful    History of cardioversion 5/18/15    DCCV-.      History of echocardiogram 04/01/2019    History of transesophageal echocardiography (KVNG) for monitoring 12/4/2013 12/13 no clots    Hyperlipidemia     Hypertelorism     Hypertension     Obesity     S/P ablation of atrial fibrillation 02/14/2014    for a-fib by dr. Cristian Rosario S/P cardiac cath 2/5/14    Shortness of breath on exertion     Sleep apnea     Uses CPAP    Teeth missing     Upper And Lower    Wears glasses     [place] teeth extracted     4 [place] Teeth Extracted In Past       Past Surgical History:        Procedure Laterality Date    ATRIAL ABLATION SURGERY  2014    for a-fib    CARDIAC DEFIBRILLATOR PLACEMENT  2014    Medtronic Viva S CRT-D Defibrillator Implanted    CARDIOVERSION  2015    KVNG/Cardioversion    COLONOSCOPY  Early 's    DENTAL SURGERY      Teeth Extracted In Past    HERNIA REPAIR Right 11/10/2017    inguinal hernia repair with mesh/ robotic    KNEE ARTHROSCOPY Left     TOTAL KNEE ARTHROPLASTY Left 2019    WISDOM TOOTH EXTRACTION      4 [place] Teeth Extracted In Past       Social History:    Social History     Tobacco Use    Smoking status: Former Smoker     Packs/day: 1.00     Years: 4.00     Pack years: 4.00     Types: Cigarettes, Cigars     Start date: 5     Quit date:      Years since quittin.7    Smokeless tobacco: Never Used   Substance Use Topics    Alcohol use: Yes     Alcohol/week: 0.0 standard drinks     Comment: \"Occ. Maybe Once A Week\"                                Counseling given: Not Answered      Vital Signs (Current): There were no vitals filed for this visit.                                            BP Readings from Last 3 Encounters:   21 120/78   21 118/80   21 116/80       NPO Status:                                                                                 BMI:   Wt Readings from Last 3 Encounters:   21 296 lb 6.4 oz (134.4 kg)   21 297 lb (134.7 kg)   21 288 lb (130.6 kg)     There is no height or weight on file to calculate BMI.    CBC:   Lab Results   Component Value Date    WBC 9.0 2019    RBC 3.37 2019    HGB 10.1 2019    HCT 32.4 2019    MCV 96.1 2019    RDW 13.6 2019     2019       CMP:   Lab Results   Component Value Date     2019    K 4.7 2019     2019    CO2 28 2019 BUN 12 08/09/2019    CREATININE 1.0 08/09/2019    GFRAA >60 08/09/2019    LABGLOM >60 08/09/2019    GLUCOSE 107 08/09/2019    PROT 6.7 04/02/2018    CALCIUM 8.9 08/09/2019    BILITOT 0.6 04/02/2018    ALKPHOS 56 04/02/2018    AST 25 04/02/2018    ALT 27 04/02/2018       POC Tests: No results for input(s): POCGLU, POCNA, POCK, POCCL, POCBUN, POCHEMO, POCHCT in the last 72 hours. Coags:   Lab Results   Component Value Date    PROTIME 17.5 06/05/2018    INR 1.54 06/05/2018    APTT 24.8 06/05/2018       HCG (If Applicable): No results found for: PREGTESTUR, PREGSERUM, HCG, HCGQUANT     ABGs: No results found for: PHART, PO2ART, ZEX9JGG, SZP0OQO, BEART, E5AQTXEY     Type & Screen (If Applicable):  No results found for: LABABO, LABRH    Drug/Infectious Status (If Applicable):  No results found for: HIV, HEPCAB    COVID-19 Screening (If Applicable):   Lab Results   Component Value Date    COVID19 NOT DETECTED 09/16/2021           Anesthesia Evaluation    Airway:         Dental:          Pulmonary:   (+) COPD:  shortness of breath:  sleep apnea: on CPAP,                             Cardiovascular:  Exercise tolerance: poor (<4 METS),   (+) hypertension:, pacemaker (BIV ICD ): pacemaker and AICD, dysrhythmias: atrial fibrillation, BLANCHARD:, hyperlipidemia         Beta Blocker:  Dose within 24 Hrs      ROS comment: EF 29%, mild ischemia RCA     Neuro/Psych:               GI/Hepatic/Renal:   (+) morbid obesity          Endo/Other:    (+) blood dyscrasia: anticoagulation therapy:., .                 Abdominal:   (+) obese,           Vascular: Other Findings:             Anesthesia Plan      general and MAC     ASA 4       Induction: intravenous. Anesthetic plan and risks discussed with patient. Pre Anesthesia Evaluation complete. Anesthesia plan, risks, benefits, alternatives, and personnel discussed with patient and/or legal guardian.  Patient and/or legal guardian verbalized an understanding and agreed to proceed. Anesthesia plan discussed with care team members and agreed upon.   JETT Olivarez - MOHIT  9/21/2021        JETT Olivarez CRNA   9/21/2021

## 2021-09-21 NOTE — ANESTHESIA PRE PROCEDURE
Department of Anesthesiology  Preprocedure Note       Name:  Sean Watson   Age:  73 y.o.  :  1950                                          MRN:  [id number]         Date:  2021      Surgeon: * Surgery not found *    Procedure:     Medications prior to admission:   Prior to Admission medications    Medication Sig Start Date End Date Taking? Authorizing Provider   dofetilide (TIKOSYN) 250 MCG capsule TAKE 1 CAPSULE BY MOUTH EVERY 12 HOURS 21   Cem Erwin MD   Cholecalciferol (VITAMIN D3 PO) Take by mouth    Historical Provider, MD   apixaban (ELIQUIS) 5 MG TABS tablet Take 1 tablet by mouth 2 times daily 21   Cem Erwin MD   hydroCHLOROthiazide (HYDRODIURIL) 25 MG tablet Take 25 mg by mouth daily    Historical Provider, MD   lisinopril (PRINIVIL;ZESTRIL) 20 MG tablet TAKE 1 TABLET BY MOUTH TWICE DAILY 19   JETT Zavala - CNP   allopurinol (ZYLOPRIM) 300 MG tablet Take 300 mg by mouth daily    Historical Provider, MD   Multiple Vitamins-Minerals (MULTIVITAMIN PO) Take by mouth every morning Over The Counter    Historical Provider, MD   CPAP Machine MISC nightly     Historical Provider, MD   carvedilol (COREG) 25 MG tablet Take 1 tablet by mouth 2 times daily (with meals).  4/20/15   Josefina Soni MD       Current medications:    Current Outpatient Medications   Medication Sig Dispense Refill    dofetilide (TIKOSYN) 250 MCG capsule TAKE 1 CAPSULE BY MOUTH EVERY 12 HOURS 60 capsule 3    Cholecalciferol (VITAMIN D3 PO) Take by mouth      apixaban (ELIQUIS) 5 MG TABS tablet Take 1 tablet by mouth 2 times daily 60 tablet 5    hydroCHLOROthiazide (HYDRODIURIL) 25 MG tablet Take 25 mg by mouth daily      lisinopril (PRINIVIL;ZESTRIL) 20 MG tablet TAKE 1 TABLET BY MOUTH TWICE DAILY 180 tablet 1    allopurinol (ZYLOPRIM) 300 MG tablet Take 300 mg by mouth daily      Multiple Vitamins-Minerals (MULTIVITAMIN PO) Take by mouth every morning Over The Counter      CPAP Machine MIS nightly       carvedilol (COREG) 25 MG tablet Take 1 tablet by mouth 2 times daily (with meals). 60 tablet 5     No current facility-administered medications for this encounter. 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    Obesity E66.9    Hyperlipidemia E78.5    ICD (implantable cardioverter-defibrillator), biventricular, in situ Z95.810    COPD (chronic obstructive pulmonary disease) ([facility] [address] J44.9    Obstructive sleep apnea G47.33    Right inguinal hernia K40.90    Personal history of other drug therapy (CODE) Z92.29    Unilateral inguinal hernia without obstruction or gangrene K40.90    Pacemaker Z95.0    S/P ablation of atrial fibrillation Z98.890, Z86.79       Past Medical History:        Diagnosis Date    Atrial fibrillation ([facility] [address]     Cardiac pacemaker 2/19/2014    BIV ICD Serial # [id number] Moderl # [id number]    Cardiomyopathy ([facility] [address]     COPD (chronic obstructive pulmonary disease) ([facility] [address]     Sees Dr. Rowan Tafoya    Hematoma - postoperative 02/19/2014    Pseudo aneurism post op at [address] groin    History of blood transfusion 2014    No Reaction To Blood Transfusion Received    History of cardiac cath 12/4/2013 12/13 EF30% mild LAD, CX     History of cardiovascular disorder 12/4/2013    EF 29%, mild ischemia RCA    History of cardioversion 12/6/2013 12/13 unsuccessful    History of cardioversion 5/18/15    DCCV-.      History of echocardiogram 04/01/2019    History of transesophageal echocardiography (KVNG) for monitoring 12/4/2013 12/13 no clots    Hyperlipidemia     Hypertelorism     Hypertension     Obesity     S/P ablation of atrial fibrillation 02/14/2014    for a-fib by dr. Ebenezer Guillaume S/P cardiac cath 2/5/14    Shortness of breath on exertion     Sleep apnea     Uses CPAP    Teeth missing     Upper And Lower    Wears glasses     [place] teeth extracted     4 [place] Teeth Extracted In Past       Past Surgical History:        Procedure Laterality Date    ATRIAL ABLATION SURGERY  2014    for a-fib    CARDIAC DEFIBRILLATOR PLACEMENT  2014    Medtronic Viva S CRT-D Defibrillator Implanted    CARDIOVERSION  2015    KVNG/Cardioversion    COLONOSCOPY  Early 's    DENTAL SURGERY      Teeth Extracted In Past    HERNIA REPAIR Right 11/10/2017    inguinal hernia repair with mesh/ robotic    KNEE ARTHROSCOPY Left     TOTAL KNEE ARTHROPLASTY Left 2019    WISDOM TOOTH EXTRACTION      4 [place] Teeth Extracted In Past       Social History:    Social History     Tobacco Use    Smoking status: Former Smoker     Packs/day: 1.00     Years: 4.00     Pack years: 4.00     Types: Cigarettes, Cigars     Start date: 5     Quit date:      Years since quittin.7    Smokeless tobacco: Never Used   Substance Use Topics    Alcohol use: Yes     Alcohol/week: 0.0 standard drinks     Comment: \"Occ.  Maybe Once A Week\"                                Counseling given: Not Answered      Vital Signs (Current):   Vitals:    21 1037 21 1203   BP: (!) 156/86 (!) 148/93   Pulse: 70 74   Resp: 19 14   SpO2: 95% 96%                                              BP Readings from Last 3 Encounters:   21 (!) 148/93   21 120/78   21 118/80       NPO Status:                                                                                 BMI:   Wt Readings from Last 3 Encounters:   21 296 lb 6.4 oz (134.4 kg)   21 297 lb (134.7 kg)   21 288 lb (130.6 kg)     There is no height or weight on file to calculate BMI.    CBC:   Lab Results   Component Value Date    WBC 9.0 2019    RBC 3.37 2019    HGB 10.1 2019    HCT 32.4 2019    MCV 96.1 2019    RDW 13.6 2019     2019       CMP:   Lab Results   Component Value Date     08/09/2019    K 4.7 08/09/2019     08/09/2019    CO2 28 08/09/2019    BUN 12 08/09/2019    CREATININE 1.0 08/09/2019    GFRAA >60 08/09/2019    LABGLOM >60 08/09/2019    GLUCOSE 107 08/09/2019    PROT 6.7 04/02/2018    CALCIUM 8.9 08/09/2019    BILITOT 0.6 04/02/2018    ALKPHOS 56 04/02/2018    AST 25 04/02/2018    ALT 27 04/02/2018       POC Tests: No results for input(s): POCGLU, POCNA, POCK, POCCL, POCBUN, POCHEMO, POCHCT in the last 72 hours. Coags:   Lab Results   Component Value Date    PROTIME 17.5 06/05/2018    INR 1.54 06/05/2018    APTT 24.8 06/05/2018       HCG (If Applicable): No results found for: PREGTESTUR, PREGSERUM, HCG, HCGQUANT     ABGs: No results found for: PHART, PO2ART, BEN9OQZ, LEL1LAS, BEART, J9KCYEOJ     Type & Screen (If Applicable):  No results found for: LABABO, LABRH    Drug/Infectious Status (If Applicable):  No results found for: HIV, HEPCAB    COVID-19 Screening (If Applicable):   Lab Results   Component Value Date    COVID19 NOT DETECTED 09/16/2021           Anesthesia Evaluation    Airway: Mallampati: II  TM distance: >3 FB   Neck ROM: full  Mouth opening: > = 3 FB Dental: normal exam         Pulmonary:normal exam    (+) COPD:  shortness of breath:  sleep apnea: on CPAP,                             Cardiovascular:  Exercise tolerance: poor (<4 METS),   (+) hypertension:, pacemaker (BIV ICD ): pacemaker and AICD, dysrhythmias: atrial fibrillation, BLANCHARD:, hyperlipidemia         Beta Blocker:  Dose within 24 Hrs      ROS comment: EF 29%, mild ischemia RCA    PE comment: PACED   Neuro/Psych:               GI/Hepatic/Renal:   (+) morbid obesity          Endo/Other:    (+) blood dyscrasia: anticoagulation therapy:., .                 Abdominal:   (+) obese,           Vascular: Other Findings:             Anesthesia Plan      MAC     ASA 4       Induction: intravenous. Anesthetic plan and risks discussed with patient.       Plan discussed with surgical team.          Pre Anesthesia Evaluation complete. Anesthesia plan, risks, benefits, alternatives, and personnel discussed with patient and/or legal guardian. Patient and/or legal guardian verbalized an understanding and agreed to proceed. Anesthesia plan discussed with care team members and agreed upon.   JETT Marmolejo - MOHIT  9/21/2021        JETT Marmolejo CRNA   9/21/2021

## 2021-09-21 NOTE — PROGRESS NOTES
Discharge instructions given with voiced understanding. To follow up with dr Daniela Connelly. Discharged to home.

## 2021-09-22 LAB
EKG ATRIAL RATE: 227 BPM
EKG ATRIAL RATE: 78 BPM
EKG DIAGNOSIS: NORMAL
EKG DIAGNOSIS: NORMAL
EKG P AXIS: 78 DEGREES
EKG P-R INTERVAL: 170 MS
EKG Q-T INTERVAL: 446 MS
EKG Q-T INTERVAL: 448 MS
EKG QRS DURATION: 148 MS
EKG QRS DURATION: 150 MS
EKG QTC CALCULATION (BAZETT): 486 MS
EKG QTC CALCULATION (BAZETT): 508 MS
EKG R AXIS: 202 DEGREES
EKG R AXIS: 251 DEGREES
EKG T AXIS: 51 DEGREES
EKG T AXIS: 68 DEGREES
EKG VENTRICULAR RATE: 71 BPM
EKG VENTRICULAR RATE: 78 BPM

## 2021-09-22 PROCEDURE — 93010 ELECTROCARDIOGRAM REPORT: CPT | Performed by: INTERNAL MEDICINE

## 2021-09-24 ENCOUNTER — PROCEDURE VISIT (OUTPATIENT)
Dept: CARDIOLOGY CLINIC | Age: 71
End: 2021-09-24
Payer: MEDICARE

## 2021-09-24 DIAGNOSIS — Z45.02 IMPLANTABLE CARDIOVERTER-DEFIBRILLATOR (ICD) AT END OF BATTERY LIFE: Primary | ICD-10-CM

## 2021-09-24 PROCEDURE — 93297 REM INTERROG DEV EVAL ICPMS: CPT | Performed by: INTERNAL MEDICINE

## 2021-09-24 PROCEDURE — 93296 REM INTERROG EVL PM/IDS: CPT | Performed by: INTERNAL MEDICINE

## 2021-09-24 PROCEDURE — 93295 DEV INTERROG REMOTE 1/2/MLT: CPT | Performed by: INTERNAL MEDICINE

## 2021-09-29 ENCOUNTER — OFFICE VISIT (OUTPATIENT)
Dept: CARDIOLOGY CLINIC | Age: 71
End: 2021-09-29
Payer: MEDICARE

## 2021-09-29 VITALS
WEIGHT: 296.8 LBS | HEIGHT: 70 IN | DIASTOLIC BLOOD PRESSURE: 82 MMHG | HEART RATE: 75 BPM | SYSTOLIC BLOOD PRESSURE: 130 MMHG | BODY MASS INDEX: 42.49 KG/M2

## 2021-09-29 DIAGNOSIS — I48.92 ATRIAL FLUTTER, UNSPECIFIED TYPE (HCC): Primary | ICD-10-CM

## 2021-09-29 DIAGNOSIS — I10 HTN (HYPERTENSION), BENIGN: ICD-10-CM

## 2021-09-29 DIAGNOSIS — Z95.810 ICD (IMPLANTABLE CARDIOVERTER-DEFIBRILLATOR), BIVENTRICULAR, IN SITU: ICD-10-CM

## 2021-09-29 DIAGNOSIS — Z98.890 HISTORY OF CARDIOVERSION: ICD-10-CM

## 2021-09-29 PROBLEM — Z92.89 HISTORY OF CARDIOVERSION: Status: ACTIVE | Noted: 2020-07-27

## 2021-09-29 PROCEDURE — 93289 INTERROG DEVICE EVAL HEART: CPT | Performed by: NURSE PRACTITIONER

## 2021-09-29 PROCEDURE — 3017F COLORECTAL CA SCREEN DOC REV: CPT | Performed by: NURSE PRACTITIONER

## 2021-09-29 PROCEDURE — G8428 CUR MEDS NOT DOCUMENT: HCPCS | Performed by: NURSE PRACTITIONER

## 2021-09-29 PROCEDURE — 1123F ACP DISCUSS/DSCN MKR DOCD: CPT | Performed by: NURSE PRACTITIONER

## 2021-09-29 PROCEDURE — G8417 CALC BMI ABV UP PARAM F/U: HCPCS | Performed by: NURSE PRACTITIONER

## 2021-09-29 PROCEDURE — 1036F TOBACCO NON-USER: CPT | Performed by: NURSE PRACTITIONER

## 2021-09-29 PROCEDURE — 99214 OFFICE O/P EST MOD 30 MIN: CPT | Performed by: NURSE PRACTITIONER

## 2021-09-29 PROCEDURE — 4040F PNEUMOC VAC/ADMIN/RCVD: CPT | Performed by: NURSE PRACTITIONER

## 2021-09-29 NOTE — LETTER
Louise Beltran  1950  [id number]    Have you had any Chest Pain that is not new? - No  If Yes DO EKG - How does it feel -    How long does the pain last -      How long have you been having the pain -    Did you take a    And did it relieve the pain -      DO EKG IF: Patient has a Heart Rate above 100 or below 40     CAD (Coronary Artery Disease) patient should have one on file every 6 months        Have you had any Shortness of Breath - No  If Yes - When     Have you had any dizziness - No  If Yes DO ORTHOSTATIC BP - when do you feel dizzy    How long does it last .        Sitting wait 5 minutes do supine (laying down) wait 5 minutes then do standing - log each in \"vitals\" area in Epic   Be sure to ask what symptoms they are having if they get dizzy while completing ortho stats such as: room spinning, nausea, etc.    Have you had any palpitations that are not new? - No  If Yes DO EKG - Do you feel your heart   How long does it last - . Is the patient on any of the following medications -   If Yes DO EKG - Needs done every 6 months    Do you have any edema - swelling in No    If Yes - CHECK TO SEE IF THE EDEMA IS PITTING  How long have they been having edema -   If Yes - Have they worn compression stockings   If they have worn compression stockings      Vein \"LEG PROBLEM Questionnaire\"  1. Do you have prominent leg veins? 2. Do you have any skin discoloration? 3. Do you have any healed or active sores? 4. Do you have swelling of the legs? 5. Do you have a family history of varicose veins? 6. Does your profession involve pro-longed        standing or heavy lifting? 7. Have you been fighting overweight problems? 8. Do you have restless legs? 9. Do you have any night time cramps?     10. Do you have any of the following in your legs:             When did you have your last labs drawn   Where did you have them done   What doctor ordered     If we do not have these labs you are retrieve these labs for these providers! Do you have a surgery or procedure scheduled in the near future - No  If Yes- DO EKG  If Yes - Who is the surgery with?    Phone number of physician   Fax number of physician   Type of surgery   GIVE THIS INFORMATION TO JACQUE RUSS     Ask patient if they want to sign up for MyChart if they are not already signed up     Check to see if we have an E-MAIL on file for the patient     Check medication list thoroughly!!! AND RECONCILE OUTSIDE MEDICATIONS  If dose has changed change the entire order not just the [place] At check out add to every patient's \"wrap up\" the following dot phrase AFTERHOURSEDUCATION and ensure we explain this to our patients

## 2021-09-29 NOTE — PATIENT INSTRUCTIONS
COVID test, pre-testing, and sign consent form on 10/18/21 at the [facility]. Procedure scheduled with Sandi Mcduffie on 10/21/21.

## 2021-09-29 NOTE — PROGRESS NOTES
Electrophysiology Follow up Note      Chief complaint :   Atrial fibrillation    Referring physician:  Alma Rosa Cordoba      Primary care physician: Sharyle Meyers, MD      History of Present Illness: This visit 9/29/2021  Patient is here today for 1 week follow-up status post cardioversion. Patient reports he has been feeling well. He denies chest pain, palpitations, shortness of breath, lightheadedness, dizziness, edema or syncope. He states 5 days ago his ICD began beeping every day at 11 AM. He is taking his medications as prescribed. Previous visit (9/8/2021)      Chief Complaint   Patient presents with    Atrial Fibrillation     Pt is here to discuss gen change. Pt denies chest pain, shortness of breath, dizziness, palpitations, and edema. Previous visit:(6/15/2018)      Chief Complaint   Patient presents with    Atrial Fibrillation     Ak-s/p cardioversion and Tikosyn 6/5. Patient denies CP, SOB, palpitations. Does report some dizziness when standing but this is occasional. Patient brought a list of his blood pressure readings. Previous visit: (5/25/2018      Chief Complaint   Patient presents with    Atrial Fibrillation     Ak-Discuss Afib on device. Patient denies CP, palpitations. Denies edema. Does report lightheadedness upon standing at times, but subsides as soon as he gets moving. Patient has an ablation 2014 and cardioversion 2015. Patient does report using CPAP. Patient reports not usually have symptoms of Afib. States he only did the first time. Previous visit:    Chief Complaint   Patient presents with    Follow Up After Procedure     Patient had KVNG and DCCV done 5/18/15. Patient states he does feel any different now when at rest than he did before DCCV. Denies chest pain, dizziness, shortness of breath, palpitations, and edema.       He reports though he is able to do more garden work and is more active though. Past Medical History:   Diagnosis Date    Atrial fibrillation [facility])     Cardiac pacemaker 2/19/2014    BIV ICD Serial # BL [id number] Moderl # [id number]    Cardiomyopathy ([facility] [address]     COPD (chronic obstructive pulmonary disease) ([facility] [address]     Sees Dr. Rowan Tafoya    Hematoma - postoperative 02/19/2014    Pseudo aneurism post op at [address] groin    History of blood transfusion 2014    No Reaction To Blood Transfusion Received    History of cardiac cath 12/4/2013 12/13 EF30% mild LAD, CX     History of cardiovascular disorder 12/4/2013    EF 29%, mild ischemia RCA    History of cardioversion 12/6/2013 12/13 unsuccessful    History of cardioversion 5/18/15    DCCV-.      History of echocardiogram 04/01/2019    History of transesophageal echocardiography (KVNG) for monitoring 12/4/2013 12/13 no clots    Hyperlipidemia     Hypertelorism     Hypertension     Obesity     S/P ablation of atrial fibrillation 02/14/2014    for a-fib by dr. Ebenezer Guillaume S/P cardiac cath 2/5/14    Shortness of breath on exertion     Sleep apnea     Uses CPAP    Teeth missing     Upper And Lower    Wears glasses     [place] teeth extracted     4 [place] Teeth Extracted In Past       Past Surgical History:   Procedure Laterality Date    ATRIAL ABLATION SURGERY  2/21/2014    for a-fib    CARDIAC DEFIBRILLATOR PLACEMENT  02/19/2014    Medtronic Viva S CRT-D Defibrillator Implanted    CARDIOVERSION  05/18/2015    KVNG/Cardioversion    COLONOSCOPY  Early 2000's    DENTAL SURGERY      Teeth Extracted In Past    HERNIA REPAIR Right 11/10/2017    inguinal hernia repair with mesh/ robotic    KNEE ARTHROSCOPY Left 1999    TOTAL KNEE ARTHROPLASTY Left 08/2019    WISDOM TOOTH EXTRACTION      4 [place] Teeth Extracted In Past       Family History   Problem Relation Age of Onset    High Blood Pressure Brother     Heart Disease Brother     Cancer Mother         Cancer Unsure What Kind    Early Death Mother 44 Positive for arthralgias. Negative for myalgias, back pain and neck stiffness. Skin: Negative for color change and rash. Allergic/Immunologic: Negative for food allergies. Neurological:  Negative for  numbness and headaches. Occasional dizziness when he stands up. Hematological: Does not bruise/bleed easily. Psychiatric/Behavioral: Negative for confusion and agitation. Physical Examination:    /82   Pulse 75   Ht 5' 10\" (1.778 m)   Wt 296 lb 12.8 oz (134.6 kg)   BMI 42.59 kg/m²    Wt Readings from Last 3 Encounters:   09/29/21 296 lb 12.8 oz (134.6 kg)   09/08/21 296 lb 6.4 oz (134.4 kg)   08/09/21 297 lb (134.7 kg)     Body mass index is 42.59 kg/m². Physical Exam   Constitutional: He is oriented to person, place, and time and well-developed, well-nourished, and in no distress. HENT:   Head: Normocephalic are normal. Pupils are equal, round, and reactive to light. Right and left eye exhibits no discharge. Neck: Normal range of motion. No JVD present. No thyromegaly present. Cardiovascular: Normal rate and regular rhythm. Exam reveals no friction rub. Murmur heard. Pulmonary/Chest: Effort normal and breath sounds normal. No stridor. No respiratory distress. He has no wheezes. Abdominal: Soft. Bowel sounds are normal. He exhibits no distension. There is no tenderness. Musculoskeletal: Normal range of motion. He exhibits no edema or tenderness. Neurological: He is alert and oriented to person, place, and time. No cranial nerve deficit. Skin: Skin is warm and dry. No rash noted. No erythema.    Psychiatric: Mood and affect normal.           CBC:   Lab Results   Component Value Date    WBC 9.0 08/09/2019    HGB 10.1 08/09/2019    HCT 32.4 08/09/2019     08/09/2019     Lipids:   Lab Results   Component Value Date    CHOL 134 02/05/2014    TRIG 104 02/05/2014    HDL 31 (L) 02/05/2014    LDLDIRECT 93 02/05/2014     PT/INR:   Lab Results   Component Value Date INR 1.54 06/05/2018        BMP:    Lab Results   Component Value Date     08/09/2019    K 4.7 08/09/2019     08/09/2019    CO2 28 08/09/2019    BUN 12 08/09/2019     CMP:   Lab Results   Component Value Date    AST 25 04/02/2018    PROT 6.7 04/02/2018    BILITOT 0.6 04/02/2018    ALKPHOS 56 04/02/2018     TSH:    No results found for: TSH          IMPRESSION / RECOMMENDATIONS:       S/P   Cardioversion- persistent atrial flutter  Monitoring for Tikoysn therapy  Persistent atrial fibrillation s/p cardioversion  CAD  Severe left ventricular systolic dysfunction s/p BIV ICD  HTN  HLD    Patient reports he has been feeling well since cardioversion  EKG obtained-a sensed V paced- no atrial flutter noted    QTc 434- this is within range to continue Tikosyn 250 mcg BID  Continue eliquis 5mg BID    ICD interrogated  Device Assessment:     The device is [facility] BIVICD . Device interrogation was performed. Mode : DDDR     Sensing is normal. Impedence is normal.  Threshold is normal.     There has not been interval changes. Estimated battery life is RRT on September 24 2021    Atrial Arrhythmia : No    Non sustained VT episodes : No    Sustained VT episodes : No      Patient activity reported by the device to be 4.4 hr/day     The underlying rhythm is AP,.  55.9 % atrial paced; 98.1 % ventricular paced. The patient is pacemaker dependent. HF management parameter are with in normal limits    No atrial fibrillation or atrial flutter noted   Device is at [facility]. Will need to change generator in 1-3 months. Date will be given to patient today    Vitals:    09/29/21 1239   BP: 130/82   Pulse: 75     BP is stable.  Continue hydrochlorothiazide 25 mg daily, lisinopril 20 mg daily and Coreg 25 mg daily            JETT Cavazos - CNP

## 2021-10-06 ENCOUNTER — NURSE ONLY (OUTPATIENT)
Dept: CARDIOLOGY CLINIC | Age: 71
End: 2021-10-06
Payer: MEDICARE

## 2021-10-06 DIAGNOSIS — Z45.02 IMPLANTABLE CARDIOVERTER-DEFIBRILLATOR (ICD) AT END OF BATTERY LIFE: Primary | ICD-10-CM

## 2021-10-06 PROCEDURE — 93284 PRGRMG EVAL IMPLANTABLE DFB: CPT | Performed by: INTERNAL MEDICINE

## 2021-10-12 DIAGNOSIS — Z95.810 ICD (IMPLANTABLE CARDIOVERTER-DEFIBRILLATOR), BIVENTRICULAR, IN SITU: Primary | ICD-10-CM

## 2021-10-18 ENCOUNTER — NURSE ONLY (OUTPATIENT)
Dept: CARDIOLOGY CLINIC | Age: 71
End: 2021-10-18
Payer: MEDICARE

## 2021-10-18 ENCOUNTER — HOSPITAL ENCOUNTER (OUTPATIENT)
Age: 71
Discharge: HOME | End: 2021-10-18
Payer: MEDICARE

## 2021-10-18 ENCOUNTER — HOSPITAL ENCOUNTER (OUTPATIENT)
Dept: GENERAL RADIOLOGY | Age: 71
Discharge: HOME | End: 2021-10-18
Payer: MEDICARE

## 2021-10-18 ENCOUNTER — HOSPITAL ENCOUNTER (OUTPATIENT)
Age: 71
Setting detail: SPECIMEN
Discharge: HOME | End: 2021-10-18
Payer: MEDICARE

## 2021-10-18 VITALS — DIASTOLIC BLOOD PRESSURE: 78 MMHG | SYSTOLIC BLOOD PRESSURE: 130 MMHG

## 2021-10-18 DIAGNOSIS — Z01.818 PRE-PROCEDURAL EXAMINATION: ICD-10-CM

## 2021-10-18 DIAGNOSIS — Z45.02 ENCOUNTER DUE TO AICD AT END OF BATTERY LIFE: ICD-10-CM

## 2021-10-18 LAB
ANION GAP SERPL CALCULATED.3IONS-SCNC: 10 MMOL/L (ref 4–16)
APTT: 31 SECONDS (ref 25.1–37.1)
BASOPHILS ABSOLUTE: 0.1 K/CU MM
BASOPHILS RELATIVE PERCENT: 0.7 % (ref 0–1)
BUN BLDV-MCNC: 17 MG/DL (ref 6–23)
CALCIUM SERPL-MCNC: 9.6 MG/DL (ref 8.3–10.6)
CHLORIDE BLD-SCNC: 96 MMOL/L (ref 99–110)
CO2: 27 MMOL/L (ref 21–32)
CREAT SERPL-MCNC: 0.9 MG/DL (ref 0.9–1.3)
DIFFERENTIAL TYPE: ABNORMAL
EOSINOPHILS ABSOLUTE: 0.3 K/CU MM
EOSINOPHILS RELATIVE PERCENT: 3.5 % (ref 0–3)
GFR AFRICAN AMERICAN: >60 ML/MIN/1.73M2
GFR NON-AFRICAN AMERICAN: >60 ML/MIN/1.73M2
GLUCOSE BLD-MCNC: 99 MG/DL (ref 70–99)
HCT VFR BLD CALC: 49.7 % (ref 42–52)
HEMOGLOBIN: 16 GM/DL (ref 13.5–18)
IMMATURE NEUTROPHIL %: 0.5 % (ref 0–0.43)
INR BLD: 1.12 INDEX
LYMPHOCYTES ABSOLUTE: 2.1 K/CU MM
LYMPHOCYTES RELATIVE PERCENT: 24.2 % (ref 24–44)
MAGNESIUM: 2.1 MG/DL (ref 1.8–2.4)
MCH RBC QN AUTO: 29.5 PG (ref 27–31)
MCHC RBC AUTO-ENTMCNC: 32.2 % (ref 32–36)
MCV RBC AUTO: 91.7 FL (ref 78–100)
MONOCYTES ABSOLUTE: 0.8 K/CU MM
MONOCYTES RELATIVE PERCENT: 9.1 % (ref 0–4)
NUCLEATED RBC %: 0 %
PDW BLD-RTO: 13.8 % (ref 11.7–14.9)
PHOSPHORUS: 4.1 MG/DL (ref 2.5–4.9)
PLATELET # BLD: 241 K/CU MM (ref 140–440)
PMV BLD AUTO: 10.6 FL (ref 7.5–11.1)
POTASSIUM SERPL-SCNC: 4.5 MMOL/L (ref 3.5–5.1)
PROTHROMBIN TIME: 14.4 SECONDS (ref 11.7–14.5)
RBC # BLD: 5.42 M/CU MM (ref 4.6–6.2)
SEGMENTED NEUTROPHILS ABSOLUTE COUNT: 5.3 K/CU MM
SEGMENTED NEUTROPHILS RELATIVE PERCENT: 62 % (ref 36–66)
SODIUM BLD-SCNC: 133 MMOL/L (ref 135–145)
TOTAL IMMATURE NEUTOROPHIL: 0.04 K/CU MM
TOTAL NUCLEATED RBC: 0 K/CU MM
WBC # BLD: 8.5 K/CU MM (ref 4–10.5)

## 2021-10-18 PROCEDURE — 36415 COLL VENOUS BLD VENIPUNCTURE: CPT

## 2021-10-18 PROCEDURE — U0003 INFECTIOUS AGENT DETECTION BY NUCLEIC ACID (DNA OR RNA); SEVERE ACUTE RESPIRATORY SYNDROME CORONAVIRUS 2 (SARS-COV-2) (CORONAVIRUS DISEASE [COVID-19]), AMPLIFIED PROBE TECHNIQUE, MAKING USE OF HIGH THROUGHPUT TECHNOLOGIES AS DESCRIBED BY CMS-2020-01-R: HCPCS

## 2021-10-18 PROCEDURE — 85610 PROTHROMBIN TIME: CPT

## 2021-10-18 PROCEDURE — 85025 COMPLETE CBC W/AUTO DIFF WBC: CPT

## 2021-10-18 PROCEDURE — 84100 ASSAY OF PHOSPHORUS: CPT

## 2021-10-18 PROCEDURE — U0005 INFEC AGEN DETEC AMPLI PROBE: HCPCS

## 2021-10-18 PROCEDURE — 80048 BASIC METABOLIC PNL TOTAL CA: CPT

## 2021-10-18 PROCEDURE — 83735 ASSAY OF MAGNESIUM: CPT

## 2021-10-18 PROCEDURE — 85730 THROMBOPLASTIN TIME PARTIAL: CPT

## 2021-10-18 PROCEDURE — 71046 X-RAY EXAM CHEST 2 VIEWS: CPT

## 2021-10-18 PROCEDURE — 99211 OFF/OP EST MAY X REQ PHY/QHP: CPT | Performed by: INTERNAL MEDICINE

## 2021-10-18 NOTE — PROGRESS NOTES
Patient informed of instructions and guidance for performing test.  Throat swab performed. Swab placed into labeled collection tube. Collection tube and lab order placed in plastic bag. Bag placed in biohazard bag and placed in fridge.  called for . Patient here in office and educated on BiV ICD Gen Change, schedule for 10/21/21 @ 1130, with arrival @ 0930, @ [facility]; risk explained; and consents signed. Also copy of orders given for labs and CXR due 10/18/21 at [facility]. Instruction given to patient to :  NPO after midnight the night before procedure; call hospital at [phone] to pre-register. May take rest of morning meds of procedure except Elqiuis. Hold Eliquis the evening dose night before procedure and Morning dose of the procedure. Patient voiced understanding. Copies of consent & info scanned in chart.

## 2021-10-19 LAB
SARS-COV-2: NOT DETECTED
SOURCE: NORMAL

## 2021-10-21 ENCOUNTER — HOSPITAL ENCOUNTER (OUTPATIENT)
Dept: CARDIAC CATH/INVASIVE PROCEDURES | Age: 71
Discharge: HOME | End: 2021-10-21
Attending: INTERNAL MEDICINE | Admitting: INTERNAL MEDICINE
Payer: MEDICARE

## 2021-10-21 VITALS
OXYGEN SATURATION: 98 % | TEMPERATURE: 97 F | HEART RATE: 78 BPM | WEIGHT: 290 LBS | RESPIRATION RATE: 19 BRPM | SYSTOLIC BLOOD PRESSURE: 132 MMHG | DIASTOLIC BLOOD PRESSURE: 78 MMHG | HEIGHT: 70 IN | BODY MASS INDEX: 41.52 KG/M2

## 2021-10-21 DIAGNOSIS — Z95.810 ICD (IMPLANTABLE CARDIOVERTER-DEFIBRILLATOR), BIVENTRICULAR, IN SITU: Primary | ICD-10-CM

## 2021-10-21 LAB
ABO/RH: NORMAL
ANTIBODY SCREEN: NEGATIVE

## 2021-10-21 PROCEDURE — 86900 BLOOD TYPING SEROLOGIC ABO: CPT

## 2021-10-21 PROCEDURE — 86901 BLOOD TYPING SEROLOGIC RH(D): CPT

## 2021-10-21 PROCEDURE — 86850 RBC ANTIBODY SCREEN: CPT

## 2021-10-21 PROCEDURE — 6360000002 HC RX W HCPCS

## 2021-10-21 PROCEDURE — C1730 CATH, EP, 19 OR FEW ELECT: HCPCS

## 2021-10-21 PROCEDURE — 2500000003 HC RX 250 WO HCPCS

## 2021-10-21 PROCEDURE — C1882 AICD, OTHER THAN SING/DUAL: HCPCS

## 2021-10-21 PROCEDURE — 33264 RMVL & RPLCMT DFB GEN MLT LD: CPT

## 2021-10-21 PROCEDURE — 2709999900 HC NON-CHARGEABLE SUPPLY

## 2021-10-21 PROCEDURE — 2720000010 HC SURG SUPPLY STERILE

## 2021-10-21 PROCEDURE — 33264 RMVL & RPLCMT DFB GEN MLT LD: CPT | Performed by: INTERNAL MEDICINE

## 2021-10-21 PROCEDURE — C1892 INTRO/SHEATH,FIXED,PEEL-AWAY: HCPCS

## 2021-10-21 PROCEDURE — 2580000003 HC RX 258

## 2021-10-21 RX ORDER — TRAMADOL HYDROCHLORIDE 50 MG/1
50 TABLET ORAL EVERY 8 HOURS PRN
Qty: 15 TABLET | Refills: 0 | Status: SHIPPED | OUTPATIENT
Start: 2021-10-21 | End: 2021-10-26

## 2021-10-21 NOTE — H&P
Electrophysiology H&p  Note      Chief complaint :   Atrial fibrillation    Referring physician:  Gauri Dye      Primary care physician: Shikha Forbes MD      History of Present Illness: Today visit (10/21/21)    Patient here for generator change. No change in H&P noted from previous clinic visit. Previous visit 9/29/2021  Patient is here today for 1 week follow-up status post cardioversion. Patient reports he has been feeling well. He denies chest pain, palpitations, shortness of breath, lightheadedness, dizziness, edema or syncope. He states 5 days ago his ICD began beeping every day at 11 AM. He is taking his medications as prescribed. Previous visit (9/8/2021)      Chief Complaint   Patient presents with    Atrial Fibrillation     Pt is here to discuss gen change. Pt denies chest pain, shortness of breath, dizziness, palpitations, and edema. Previous visit:(6/15/2018)      Chief Complaint   Patient presents with    Atrial Fibrillation     Ak-s/p cardioversion and Tikosyn 6/5. Patient denies CP, SOB, palpitations. Does report some dizziness when standing but this is occasional. Patient brought a list of his blood pressure readings. Previous visit: (5/25/2018      Chief Complaint   Patient presents with    Atrial Fibrillation     Ak-Discuss Afib on device. Patient denies CP, palpitations. Denies edema. Does report lightheadedness upon standing at times, but subsides as soon as he gets moving. Patient has an ablation 2014 and cardioversion 2015. Patient does report using CPAP. Patient reports not usually have symptoms of Afib. States he only did the first time. Previous visit:    Chief Complaint   Patient presents with    Follow Up After Procedure     Patient had KVNG and DCCV done 5/18/15. Patient states he does feel any different now when at rest than he did before DCCV.  Denies chest pain, dizziness, shortness of breath, palpitations, and edema. He reports though he is able to do more garden work and is more active though. Past Medical History:   Diagnosis Date    Atrial fibrillation [facility])     Cardiac pacemaker 2/19/2014    BIV ICD Serial # [id number] Moderl # [id number]    [facility]omy[facility]     COPD (chronic obstructive pulmonary disease) ([facility]     Sees Dr. Oleg Johnson    Hematoma - postoperative 02/19/2014    Pseudo aneurism post op at [address] groin    History of blood transfusion 2014    No Reaction To Blood Transfusion Received    History of cardiac cath 12/4/2013 12/13 EF30% mild LAD, CX     History of cardiovascular disorder 12/4/2013    EF 29%, mild ischemia RCA    History of cardioversion 12/6/2013 12/13 unsuccessful    History of cardioversion 5/18/15    DCCV-.      History of echocardiogram 04/01/2019    History of transesophageal echocardiography (KVNG) for monitoring 12/4/2013 12/13 no clots    Hyperlipidemia     Hypertelorism     Hypertension     Obesity     S/P ablation of atrial fibrillation 02/14/2014    for a-fib by dr. Joi Ortega S/P cardiac cath 2/5/14    Shortness of breath on exertion     Sleep apnea     Uses CPAP    Teeth missing     Upper And Lower    Wears glasses     [place] teeth extracted     4 [place] Teeth Extracted In Past       Past Surgical History:   Procedure Laterality Date    ATRIAL ABLATION SURGERY  2/21/2014    for a-fib    CARDIAC DEFIBRILLATOR PLACEMENT  02/19/2014    Medtronic Viva S CRT-D Defibrillator Implanted    CARDIOVERSION  05/18/2015    KVNG/Cardioversion    COLONOSCOPY  Early 2000's    DENTAL SURGERY      Teeth Extracted In Past    HERNIA REPAIR Right 11/10/2017    inguinal hernia repair with mesh/ robotic    KNEE ARTHROSCOPY Left 1999    TOTAL KNEE ARTHROPLASTY Left 08/2019    WISDOM TOOTH EXTRACTION      4 [place] Teeth Extracted In Past       Family History   Problem Relation Age of Onset    High Blood Pressure Brother (134.6 kg)   09/08/21 296 lb 6.4 oz (134.4 kg)     Body mass index is 41.61 kg/m². Physical Exam   Constitutional: He is oriented to person, place, and time and well-developed, well-nourished, and in no distress. HENT:   Head: Normocephalic are normal. Pupils are equal, round, and reactive to light. Right and left eye exhibits no discharge. Neck: Normal range of motion. No JVD present. No thyromegaly present. Cardiovascular: Normal rate and regular rhythm. Exam reveals no friction rub. Murmur heard. Pulmonary/Chest: Effort normal and breath sounds normal. No stridor. No respiratory distress. He has no wheezes. Abdominal: Soft. Bowel sounds are normal. He exhibits no distension. There is no tenderness. Musculoskeletal: Normal range of motion. He exhibits no edema or tenderness. Neurological: He is alert and oriented to person, place, and time. No cranial nerve deficit. Skin: Skin is warm and dry. No rash noted. No erythema.    Psychiatric: Mood and affect normal.           CBC:   Lab Results   Component Value Date    WBC 8.5 10/18/2021    HGB 16.0 10/18/2021    HCT 49.7 10/18/2021     10/18/2021     Lipids:   Lab Results   Component Value Date    CHOL 134 02/05/2014    TRIG 104 02/05/2014    HDL 31 (L) 02/05/2014    LDLDIRECT 93 02/05/2014     PT/INR:   Lab Results   Component Value Date    INR 1.12 10/18/2021        BMP:    Lab Results   Component Value Date     (L) 10/18/2021    K 4.5 10/18/2021    CL 96 (L) 10/18/2021    CO2 27 10/18/2021    BUN 17 10/18/2021     CMP:   Lab Results   Component Value Date    AST 25 04/02/2018    PROT 6.7 04/02/2018    BILITOT 0.6 04/02/2018    ALKPHOS 56 04/02/2018     TSH:    No results found for: TSH    ASA -3  MALLAMPATI - 3      IMPRESSION / RECOMMENDATIONS:     BIVICD at [facility]   S/P   Cardioversion- persistent atrial flutter  Monitoring for Tikoysn therapy  Persistent atrial fibrillation s/p cardioversion  CAD  Severe left ventricular systolic dysfunction s/p BIV ICD  HTN  HLD    Patient reports he has been feeling well since cardioversion  EKG obtained-a sensed V paced- no atrial flutter noted    QTc 434- this is within range to continue Tikosyn 250 mcg BID  Continue eliquis 5mg BID    ICD interrogated  Device Assessment:     The device is Medtronic BIVICD . Device interrogation was performed. Mode : DDDR     Sensing is normal. Impedence is normal.  Threshold is normal.     There has not been interval changes. Estimated battery life is RRT on September 24 2021    Atrial Arrhythmia : No    Non sustained VT episodes : No    Sustained VT episodes : No      Patient activity reported by the device to be 4.4 hr/day     The underlying rhythm is AP,.  55.9 % atrial paced; 98.1 % ventricular paced. The patient is pacemaker dependent. HF management parameter are with in normal limits     Device is at [facility]. Will need to change generator     The risks including, but not limited to, the risks of vascular injury, bleeding, infection, device malfunction, lead dislodgement, radiation exposure, injury to cardiac and surrounding structures (including pneumothorax), stroke, myocardial infarction and death were discussed in detail. The patient opted to proceed with the device implantation.      Ronak Argueta MD

## 2021-10-21 NOTE — OP NOTE
PROCEDURE PERFORMED:      1.  New bivicd Generator placement  2. Old BIVICD Generator removal    Attending : Senia Escamilla MD    REFERRING PHYSICIAN: Dr. Green Pack: None. ESTIMATED BLOOD LOSS: 20 cc. ANESTHESIA: Versed, Fentanyl. OTHER MEDICATIONS: Cefazolin 2 g IV preoperatively. HISTORY:  Patient with hx of cardiomyopathy  s/p BIVICD with device now at [facility]: The patient was brought to the electrophysiology laboratory in stable condition. The patient was in a fasting, nonsedated state. The risks, benefits and alternatives of the procedure were discussed with the patient. The risks including, but not limited to, the risks of infection, bleeding, lead or device malfunction were all discussed. The patient considered his treatment options and decided to proceed with the pacemaker generator change. The patient was prepped and draped in a sterile fashion. An approximately 3 -cm incision was made over the existing scar in the left pectoral area after administration of lidocaine. Using electrocautery and blunt dissection, the pocket was accessed and the device was externalized. Each lead was disconnected from old device and then old device was removed from the pocket. Each of the leads was electronically analyzed for proper function. The pocket was irrigated with an antibiotic solution. The leads were then connected to the new pulse generator and placed into the cleaned pocket. Final parameters were obtained and are detailed below. The pocket was closed in three successive layers using 2-0, 2-0 and 4-0 Vicryl suture material. Steri-Strips and a pressure dressing were applied. The patient was transported to the holding area in stable condition. IMPLANTED MATERIALS:             RECOMMENDATIONS:   1. Bed rest x 2 hours  2. Discharge home in 2 hours if stable  3.  Follow up in the EP clinic in 1-2 weeks for wound and device evaluation

## 2021-10-21 NOTE — PROGRESS NOTES
Discharge instructions reviewed. Questions answered and patient verbalized understanding. PIV removed. Chest Site WNL.

## 2021-10-22 ENCOUNTER — TELEPHONE (OUTPATIENT)
Dept: CARDIOLOGY CLINIC | Age: 71
End: 2021-10-22

## 2021-11-02 ENCOUNTER — NURSE ONLY (OUTPATIENT)
Dept: CARDIOLOGY CLINIC | Age: 71
End: 2021-11-02

## 2021-11-02 VITALS — TEMPERATURE: 97.3 F

## 2021-11-02 DIAGNOSIS — Z95.810 STATUS POST IMPLANTATION OF AUTOMATIC CARDIOVERTER/DEFIBRILLATOR (AICD): Primary | ICD-10-CM

## 2021-11-02 PROCEDURE — 99024 POSTOP FOLLOW-UP VISIT: CPT | Performed by: INTERNAL MEDICINE

## 2021-11-02 NOTE — PROGRESS NOTES
Patient seen for site check post ICD implant. Dressing removed. No signs of inflammation or infection noted. Edges well approximated. Patient has no complaints of pain or discomfort. Single steri strip applied. Patient instructed to not lift arm higher than shoulder level. Patient advised to call [employer] and order home monitor.

## 2021-11-18 ENCOUNTER — OFFICE VISIT (OUTPATIENT)
Dept: CARDIOLOGY CLINIC | Age: 71
End: 2021-11-18
Payer: MEDICARE

## 2021-11-18 VITALS
HEIGHT: 70 IN | SYSTOLIC BLOOD PRESSURE: 118 MMHG | WEIGHT: 303 LBS | BODY MASS INDEX: 43.38 KG/M2 | DIASTOLIC BLOOD PRESSURE: 78 MMHG | OXYGEN SATURATION: 96 % | HEART RATE: 74 BPM

## 2021-11-18 DIAGNOSIS — I48.19 PERSISTENT ATRIAL FIBRILLATION (HCC): Primary | ICD-10-CM

## 2021-11-18 DIAGNOSIS — Z95.810 ICD (IMPLANTABLE CARDIOVERTER-DEFIBRILLATOR), BIVENTRICULAR, IN SITU: ICD-10-CM

## 2021-11-18 DIAGNOSIS — I48.92 ATRIAL FLUTTER, UNSPECIFIED TYPE (HCC): ICD-10-CM

## 2021-11-18 DIAGNOSIS — E66.01 CLASS 3 SEVERE OBESITY DUE TO EXCESS CALORIES WITH SERIOUS COMORBIDITY AND BODY MASS INDEX (BMI) OF 40.0 TO 44.9 IN ADULT (HCC): ICD-10-CM

## 2021-11-18 DIAGNOSIS — I10 HTN (HYPERTENSION), BENIGN: ICD-10-CM

## 2021-11-18 PROCEDURE — G8427 DOCREV CUR MEDS BY ELIG CLIN: HCPCS | Performed by: NURSE PRACTITIONER

## 2021-11-18 PROCEDURE — 3017F COLORECTAL CA SCREEN DOC REV: CPT | Performed by: NURSE PRACTITIONER

## 2021-11-18 PROCEDURE — 4040F PNEUMOC VAC/ADMIN/RCVD: CPT | Performed by: NURSE PRACTITIONER

## 2021-11-18 PROCEDURE — 99214 OFFICE O/P EST MOD 30 MIN: CPT | Performed by: NURSE PRACTITIONER

## 2021-11-18 PROCEDURE — G8484 FLU IMMUNIZE NO ADMIN: HCPCS | Performed by: NURSE PRACTITIONER

## 2021-11-18 PROCEDURE — 1036F TOBACCO NON-USER: CPT | Performed by: NURSE PRACTITIONER

## 2021-11-18 PROCEDURE — 1123F ACP DISCUSS/DSCN MKR DOCD: CPT | Performed by: NURSE PRACTITIONER

## 2021-11-18 PROCEDURE — G8417 CALC BMI ABV UP PARAM F/U: HCPCS | Performed by: NURSE PRACTITIONER

## 2021-11-18 NOTE — PATIENT INSTRUCTIONS
Please be informed that if you contact our office outside of normal business hours the physician on call cannot help with any scheduling or rescheduling issues, procedure instruction questions or any type of medication issue. We advise you for any urgent/emergency that you go to the nearest emergency room! PLEASE CALL OUR OFFICE DURING NORMAL BUSINESS HOURS    Monday - Friday   8 am to 5 pm    [place]: [place]: [phone]    [place]:  [phone]    **It is YOUR responsibilty to bring medication bottles and/or updated medication list to [address] [place] [address].  This will allow us to better serve you and all your healthcare needs**

## 2021-11-18 NOTE — PROGRESS NOTES
Electrophysiology Follow Up  Note      Chief complaint :   Atrial fibrillation    Referring physician:  Maxwell Graham      Primary care physician: Caterina Montez MD      History of Present Illness: This visit 11/18/2021  Patient is here today for 1 month follow-up status post generator change. Patient reports the left upper chest site has healed well. He states that he has some shortness of breath when going up steps. He denies chest pain, palpitations, lightheadedness, dizziness, edema or syncope. Previous visit     Patient here for generator change. No change in H&P noted from previous clinic visit. Previous visit 9/29/2021  Patient is here today for 1 week follow-up status post cardioversion. Patient reports he has been feeling well. He denies chest pain, palpitations, shortness of breath, lightheadedness, dizziness, edema or syncope. He states 5 days ago his ICD began beeping every day at 11 AM. He is taking his medications as prescribed. Previous visit (9/8/2021)      Chief Complaint   Patient presents with    Atrial Fibrillation     Pt is here to discuss gen change. Pt denies chest pain, shortness of breath, dizziness, palpitations, and edema. Previous visit:(6/15/2018)      Chief Complaint   Patient presents with    Atrial Fibrillation     Ak-s/p cardioversion and Tikosyn 6/5. Patient denies CP, SOB, palpitations. Does report some dizziness when standing but this is occasional. Patient brought a list of his blood pressure readings. Previous visit: (5/25/2018      Chief Complaint   Patient presents with    Atrial Fibrillation     Ak-Discuss Afib on device. Patient denies CP, palpitations. Denies edema. Does report lightheadedness upon standing at times, but subsides as soon as he gets moving. Patient has an ablation 2014 and cardioversion 2015. Patient does report using CPAP.  Patient reports not usually have symptoms of Afib. States he only did the first time. Previous visit:    Chief Complaint   Patient presents with    Follow Up After Procedure     Patient had KVNG and DCCV done 5/18/15. Patient states he does feel any different now when at rest than he did before DCCV. Denies chest pain, dizziness, shortness of breath, palpitations, and edema. He reports though he is able to do more garden work and is more active though. Past Medical History:   Diagnosis Date    Atrial fibrillation [facility])     Cardiac pacemaker 2/19/2014    BIV ICD Serial # [id number] Moderl # [id number]    [facility]     COPD (chronic obstructive pulmonary disease) ([facility]     Sees Dr. Viky Zaidi    Hematoma - postoperative 02/19/2014    Pseudo aneurism post op at [address] groin    History of blood transfusion 2014    No Reaction To Blood Transfusion Received    History of cardiac cath 12/4/2013 12/13 EF30% mild LAD, CX     History of cardiovascular disorder 12/4/2013    EF 29%, mild ischemia RCA    History of cardioversion 12/6/2013 12/13 unsuccessful    History of cardioversion 5/18/15    DCCV-.      History of echocardiogram 04/01/2019    History of transesophageal echocardiography (KVNG) for monitoring 12/4/2013 12/13 no clots    Hyperlipidemia     Hypertelorism     Hypertension     Obesity     S/P ablation of atrial fibrillation 02/14/2014    for a-fib by dr. Froilan Ledezma S/P cardiac cath 2/5/14    Shortness of breath on exertion     Sleep apnea     Uses CPAP    Teeth missing     Upper And Lower    Wears glasses     [place] teeth extracted     4 [place] Teeth Extracted In Past       Past Surgical History:   Procedure Laterality Date    ATRIAL ABLATION SURGERY  02/21/2014    for a-fib    CARDIAC DEFIBRILLATOR PLACEMENT  02/19/2014    Medtronic Viva S CRT-D Defibrillator Implanted    CARDIAC DEFIBRILLATOR PLACEMENT Left 10/21/2021    BIVICD Generator change - Medtronic [employer] MRI CRT-D HENT: Negative for congestion, ear pain and tinnitus. Eyes: Negative for photophobia, pain and visual disturbance. Respiratory: shortness of breath improved. Negative for cough, chest tightness and wheezing. Cardiovascular: No palpitations. Negative for chest pain and leg swelling. Gastrointestinal: Negative for nausea, vomiting, abdominal pain, diarrhea, constipation and blood in stool. Endocrine: Negative for cold intolerance and heat intolerance. Genitourinary: Negative for dysuria, hematuria and flank pain. Musculoskeletal: Positive for arthralgias. Negative for myalgias, back pain and neck stiffness. Skin: Negative for color change and rash. Allergic/Immunologic: Negative for food allergies. Neurological:  Negative for  numbness and headaches. Occasional dizziness when he stands up. Hematological: Does not bruise/bleed easily. Psychiatric/Behavioral: Negative for confusion and agitation. Physical Examination:    /78   Pulse 74   Ht 5' 10\" (1.778 m)   Wt (!) 303 lb (137.4 kg)   SpO2 96%   BMI 43.48 kg/m²    Wt Readings from Last 3 Encounters:   11/18/21 (!) 303 lb (137.4 kg)   10/21/21 290 lb (131.5 kg)   09/29/21 296 lb 12.8 oz (134.6 kg)     Body mass index is 43.48 kg/m². Physical Exam   Constitutional: He is oriented to person, place, and time and well-developed, well-nourished, and in no distress. HENT:   Head: Normocephalic are normal. Pupils are equal, round, and reactive to light. Right and left eye exhibits no discharge. Neck: Normal range of motion. No JVD present. No thyromegaly present. Cardiovascular: Normal rate and regular rhythm. Exam reveals no friction rub. Murmur heard. Pulmonary/Chest: Effort normal and breath sounds normal. No stridor. No respiratory distress. He has no wheezes. Abdominal: Soft. Bowel sounds are normal. He exhibits no distension. There is no tenderness. Musculoskeletal: Normal range of motion.  He exhibits no edema or tenderness. Neurological: He is alert and oriented to person, place, and time. No cranial nerve deficit. Skin: Skin is warm and dry. No rash noted. No erythema. Left upper chest site well approximated. No redness no swelling no hematoma. Psychiatric: Mood and affect normal.           CBC:   Lab Results   Component Value Date    WBC 8.5 10/18/2021    HGB 16.0 10/18/2021    HCT 49.7 10/18/2021     10/18/2021     Lipids:   Lab Results   Component Value Date    CHOL 134 02/05/2014    TRIG 104 02/05/2014    HDL 31 (L) 02/05/2014    LDLDIRECT 93 02/05/2014     PT/INR:   Lab Results   Component Value Date    INR 1.12 10/18/2021        BMP:    Lab Results   Component Value Date     (L) 10/18/2021    K 4.5 10/18/2021    CL 96 (L) 10/18/2021    CO2 27 10/18/2021    BUN 17 10/18/2021     CMP:   Lab Results   Component Value Date    AST 25 04/02/2018    PROT 6.7 04/02/2018    BILITOT 0.6 04/02/2018    ALKPHOS 56 04/02/2018     TSH:    No results found for: TSH          IMPRESSION / RECOMMENDATIONS:     Persistent atrial fibrillation vs atrial flutter  Status post generator change of BiV ICD  Status post cardioversion of persistent atrial flutter  Monitoring for Tikosyn therapy  Persistent atrial fibrillation status post cardioversion  CAD  Severe left ventricular systolic dysfunction status post BiV ICD  Hypertension  Hyperlipidemia  Atrial fibrillation ablation in 2015 by Dr. Cinthya Berger  Obesity BMI 43.48      Patient reports he has had some shortness of breath when going up stairs  Left upper chest site well approximated. No signs of redness swelling or infection. No hematoma    ICD interrogated  Device Assessment:     The device is Medtronic BIVICD . Device interrogation was performed. Mode : DDDR     Sensing is normal. Impedence is normal.  Threshold is normal.     There has not been interval changes.       Estimated Battery Life- 8.2 years    Atrial Arrhythmia : 88.5% burden    Non sustained VT episodes : No    Sustained VT episodes : No      Patient activity reported by the device to be 2.9 hr/day     The underlying rhythm is AS,.  4.7 % atrial paced; 96.7 % ventricular paced. The patient is pacemaker dependent. HF management parameter are with in normal limits    Patient noted to be in atrial fibrillation vs atrial flutter  Rate appears to be controlled  Continue eliquis 5 mg BID, Tikosyn 250 mcg BID and coreg 25 mg BID  Will discuss with Dr Adriana Watson regarding plan of care  I will call patient to discuss the options moving forward  Patient does drink 1 cup of coffee a day  He drinks very little alcohol  I did encourage him to stop alcohol and caffeine  He voiced understanding. Vitals:    11/18/21 1016   BP: 118/78   Pulse: 74   SpO2: 96%   BP is stable- continue hydrochlorothiazide 25 mg daily and lisinopril 20 mg daily    Obesity BMI 43.48- recommend regular exercise and dietary changes.      JETT Denny - CNP

## 2021-11-19 ENCOUNTER — NURSE ONLY (OUTPATIENT)
Dept: NON INVASIVE DIAGNOSTICS | Age: 71
End: 2021-11-19

## 2021-11-19 RX ORDER — APIXABAN 5 MG/1
TABLET, FILM COATED ORAL
Qty: 60 TABLET | Refills: 5 | OUTPATIENT
Start: 2021-11-19

## 2021-11-19 NOTE — PROGRESS NOTES
Message left on patient's machine  Dr Lary Forman would like to see patient in the office in 1 month.   I left a message with Krys Swanson to schedule office visit

## 2021-11-22 ENCOUNTER — OFFICE VISIT (OUTPATIENT)
Dept: PULMONOLOGY | Age: 71
End: 2021-11-22
Payer: MEDICARE

## 2021-11-22 VITALS
OXYGEN SATURATION: 97 % | HEART RATE: 88 BPM | SYSTOLIC BLOOD PRESSURE: 126 MMHG | BODY MASS INDEX: 42.95 KG/M2 | HEIGHT: 70 IN | DIASTOLIC BLOOD PRESSURE: 84 MMHG | WEIGHT: 300 LBS

## 2021-11-22 DIAGNOSIS — G47.33 OBSTRUCTIVE SLEEP APNEA: Primary | ICD-10-CM

## 2021-11-22 PROCEDURE — 4040F PNEUMOC VAC/ADMIN/RCVD: CPT | Performed by: INTERNAL MEDICINE

## 2021-11-22 PROCEDURE — G8427 DOCREV CUR MEDS BY ELIG CLIN: HCPCS | Performed by: INTERNAL MEDICINE

## 2021-11-22 PROCEDURE — 1123F ACP DISCUSS/DSCN MKR DOCD: CPT | Performed by: INTERNAL MEDICINE

## 2021-11-22 PROCEDURE — 1036F TOBACCO NON-USER: CPT | Performed by: INTERNAL MEDICINE

## 2021-11-22 PROCEDURE — 99213 OFFICE O/P EST LOW 20 MIN: CPT | Performed by: INTERNAL MEDICINE

## 2021-11-22 PROCEDURE — G8417 CALC BMI ABV UP PARAM F/U: HCPCS | Performed by: INTERNAL MEDICINE

## 2021-11-22 PROCEDURE — G8484 FLU IMMUNIZE NO ADMIN: HCPCS | Performed by: INTERNAL MEDICINE

## 2021-11-22 PROCEDURE — 3017F COLORECTAL CA SCREEN DOC REV: CPT | Performed by: INTERNAL MEDICINE

## 2021-11-22 NOTE — PROGRESS NOTES
as gas pumps, door handles, shopping carts, etc. Self monitoring for infection - fever, chills, cough, SOB. Should they develop symptoms they should call office for further instructions. Return in about 36 weeks (around 8/1/2022) for Recheck for Obstructive Sleep Apnea. This dictation was performed with a verbal recognition program and it was checked for errors. It is possible that there are still dictated errors within this office note. Any errors should be brought immediately to my attention for correction. All efforts were made to ensure that this office note is accurate.

## 2021-11-23 ENCOUNTER — TELEPHONE (OUTPATIENT)
Dept: CARDIOLOGY CLINIC | Age: 71
End: 2021-11-23

## 2021-11-23 NOTE — LETTER
Cardiology [address] [address]  Phone: [phone]  Fax: [phone]    11/23/2021        [address]  [address] [place] [address]            Dear Jorge Le: This is your [facility] schedule. You can justo your calendar with these dates. Remember that your device is wireless and should automatically do these checks while you are sleeping. If for any reason I do not get your transmission then I will call you and ask that you send a manual transmission. If you have any questions or concerns, please call and ask for Greg Monaco at [phone]. Thank you.

## 2021-11-30 ENCOUNTER — TELEPHONE (OUTPATIENT)
Dept: CARDIOLOGY CLINIC | Age: 71
End: 2021-11-30

## 2021-12-08 ENCOUNTER — OFFICE VISIT (OUTPATIENT)
Dept: CARDIOLOGY CLINIC | Age: 71
End: 2021-12-08
Payer: MEDICARE

## 2021-12-08 VITALS
SYSTOLIC BLOOD PRESSURE: 120 MMHG | HEIGHT: 70 IN | DIASTOLIC BLOOD PRESSURE: 70 MMHG | BODY MASS INDEX: 43.74 KG/M2 | HEART RATE: 85 BPM | WEIGHT: 305.5 LBS

## 2021-12-08 DIAGNOSIS — I48.92 ATRIAL FLUTTER, UNSPECIFIED TYPE (HCC): Primary | ICD-10-CM

## 2021-12-08 PROCEDURE — 1123F ACP DISCUSS/DSCN MKR DOCD: CPT | Performed by: INTERNAL MEDICINE

## 2021-12-08 PROCEDURE — 1036F TOBACCO NON-USER: CPT | Performed by: INTERNAL MEDICINE

## 2021-12-08 PROCEDURE — G8484 FLU IMMUNIZE NO ADMIN: HCPCS | Performed by: INTERNAL MEDICINE

## 2021-12-08 PROCEDURE — G8427 DOCREV CUR MEDS BY ELIG CLIN: HCPCS | Performed by: INTERNAL MEDICINE

## 2021-12-08 PROCEDURE — 4040F PNEUMOC VAC/ADMIN/RCVD: CPT | Performed by: INTERNAL MEDICINE

## 2021-12-08 PROCEDURE — 99214 OFFICE O/P EST MOD 30 MIN: CPT | Performed by: INTERNAL MEDICINE

## 2021-12-08 PROCEDURE — 3017F COLORECTAL CA SCREEN DOC REV: CPT | Performed by: INTERNAL MEDICINE

## 2021-12-08 PROCEDURE — G8417 CALC BMI ABV UP PARAM F/U: HCPCS | Performed by: INTERNAL MEDICINE

## 2021-12-08 NOTE — PROGRESS NOTES
Electrophysiology FU Note      Chief complaint :   Shortness of breath with exertion    Referring physician:  Justina Carver      Primary care physician: Nadeem Lam MD      History of Present Illness: This visit (12/8/2021)      Chief Complaint   Patient presents with    Follow-up     Pt does have SOBOE. Pt drinks alcohol occ. Pt doesn't smoke. Pt doesn't exercise. Pt does drink 1 cup coffee daily           Previous visit: (9/8/2021)      Chief Complaint   Patient presents with    Atrial Fibrillation     Pt is here to discuss gen change. Pt denies chest pain, shortness of breath, dizziness, palpitations, and edema. Previous visit:(6/15/2018)      Chief Complaint   Patient presents with    Atrial Fibrillation     Ak-s/p cardioversion and Tikosyn 6/5. Patient denies CP, SOB, palpitations. Does report some dizziness when standing but this is occasional. Patient brought a list of his blood pressure readings. Previous visit: (5/25/2018      Chief Complaint   Patient presents with    Atrial Fibrillation     Ak-Discuss Afib on device. Patient denies CP, palpitations. Denies edema. Does report lightheadedness upon standing at times, but subsides as soon as he gets moving. Patient has an ablation 2014 and cardioversion 2015. Patient does report using CPAP. Patient reports not usually have symptoms of Afib. States he only did the first time. Previous visit:    Chief Complaint   Patient presents with    Follow Up After Procedure     Patient had KVNG and DCCV done 5/18/15. Patient states he does feel any different now when at rest than he did before DCCV. Denies chest pain, dizziness, shortness of breath, palpitations, and edema. He reports though he is able to do more garden work and is more active though.         Past Medical History:   Diagnosis Date    Atrial fibrillation [facility])     Cardiac pacemaker 2/19/2014    BIV ICD Serial # BL [id number] Moderl # [id number]    Cardiomyopathy ([address]     COPD (chronic obstructive pulmonary disease) (HCC)     Sees Dr. Gama Rocha    Hematoma - postoperative 02/19/2014    Pseudo aneurism post op at [address] groin    History of blood transfusion 2014    No Reaction To Blood Transfusion Received    History of cardiac cath 12/4/2013 12/13 EF30% mild LAD, CX     History of cardiovascular disorder 12/4/2013    EF 29%, mild ischemia RCA    History of cardioversion 12/6/2013 12/13 unsuccessful    History of cardioversion 5/18/15    DCCV-.      History of echocardiogram 04/01/2019    History of transesophageal echocardiography (KVNG) for monitoring 12/4/2013 12/13 no clots    Hyperlipidemia     Hypertelorism     Hypertension     Obesity     S/P ablation of atrial fibrillation 02/14/2014    for a-fib by dr. Kim Owens S/P cardiac cath 2/5/14    Shortness of breath on exertion     Sleep apnea     Uses CPAP    Teeth missing     Upper And Lower    Wears glasses     [place] teeth extracted     4 [place] Teeth Extracted In Past       Past Surgical History:   Procedure Laterality Date    ATRIAL ABLATION SURGERY  02/21/2014    for a-fib    CARDIAC DEFIBRILLATOR PLACEMENT  02/19/2014    Medtronic Viva S CRT-D Defibrillator Implanted    CARDIAC DEFIBRILLATOR PLACEMENT Left 10/21/2021    BIVICD Generator change - Medtronic Claria MRI CRT-D SureScan    CARDIOVERSION  05/18/2015    KVNG/Cardioversion    COLONOSCOPY  Early 2000's    DENTAL SURGERY      Teeth Extracted In Past    HERNIA REPAIR Right 11/10/2017    inguinal hernia repair with mesh/ robotic    KNEE ARTHROSCOPY Left 1999    TOTAL KNEE ARTHROPLASTY Left 08/2019    WISDOM TOOTH EXTRACTION      4 [place] Teeth Extracted In Past       Family History   Problem Relation Age of Onset    High Blood Pressure Brother     Heart Disease Brother     Cancer Mother         Cancer Unsure What Kind    Early Death Mother 44        Cancer Unsure What Kind    Heart Attack Father     Heart Disease Father         Heart Attack    Diabetes Father         reports that he quit smoking about 47 years ago. His smoking use included cigarettes and cigars. He started smoking about 51 years ago. He has a 4.00 pack-year smoking history. He has never used smokeless tobacco. He reports current alcohol use. He reports that he does not use drugs. No Known Allergies    Current Outpatient Medications   Medication Sig Dispense Refill    apixaban (ELIQUIS) 5 MG TABS tablet Take 1 tablet by mouth 2 times daily 60 tablet 5    dofetilide (TIKOSYN) 250 MCG capsule TAKE 1 CAPSULE BY MOUTH EVERY 12 HOURS 60 capsule 3    Cholecalciferol (VITAMIN D3 PO) Take by mouth      hydroCHLOROthiazide (HYDRODIURIL) 25 MG tablet Take 25 mg by mouth daily      lisinopril (PRINIVIL;ZESTRIL) 20 MG tablet TAKE 1 TABLET BY MOUTH TWICE DAILY 180 tablet 1    allopurinol (ZYLOPRIM) 300 MG tablet Take 300 mg by mouth daily      Multiple Vitamins-Minerals (MULTIVITAMIN PO) Take by mouth every morning Over The Counter      CPAP Machine MISC nightly       carvedilol (COREG) 25 MG tablet Take 1 tablet by mouth 2 times daily (with meals). 60 tablet 5     No current facility-administered medications for this visit. Review of Systems:   Review of Systems   Constitutional: Denies fatigue. Negative for fever, chills and activity change. HENT: Negative for congestion, ear pain and tinnitus. Eyes: Negative for photophobia, pain and visual disturbance. Respiratory: shortness of breath. Negative for cough, chest tightness and wheezing. Cardiovascular: No palpitations. Negative for chest pain and leg swelling. Gastrointestinal: Negative for nausea, vomiting, abdominal pain, diarrhea, constipation and blood in stool. Endocrine: Negative for cold intolerance and heat intolerance. Genitourinary: Negative for dysuria, hematuria and flank pain. Musculoskeletal: Positive for arthralgias. Negative for myalgias, back pain and neck stiffness. Skin: Negative for color change and rash. Allergic/Immunologic: Negative for food allergies. Neurological:  Negative for  numbness and headaches. Occasional dizziness when he stands up. Hematological: Does not bruise/bleed easily. Psychiatric/Behavioral: Negative for confusion and agitation. Physical Examination:    /70   Pulse 85   Ht 5' 10\" (1.778 m)   Wt (!) 305 lb 8 oz (138.6 kg)   BMI 43.83 kg/m²    Wt Readings from Last 3 Encounters:   12/08/21 (!) 305 lb 8 oz (138.6 kg)   11/22/21 300 lb (136.1 kg)   11/18/21 (!) 303 lb (137.4 kg)     Body mass index is 43.83 kg/m². Physical Exam   Constitutional: He is oriented to person, place, and time and well-developed, well-nourished, and in no distress. HENT:   Head: Normocephalic and atraumatic. Eyes: Conjunctivae and EOM are normal. Pupils are equal, round, and reactive to light. Right eye exhibits no discharge. Neck: Normal range of motion. No JVD present. No thyromegaly present. Cardiovascular: IRREGULAR RHYTHM. Exam reveals no friction rub. Murmur heard. Pulmonary/Chest: Effort normal and breath sounds normal. No stridor. No respiratory distress. He has no wheezes. Abdominal: Soft. Bowel sounds are normal. He exhibits no distension. There is no tenderness. Musculoskeletal: Normal range of motion. He exhibits no edema or tenderness. Neurological: He is alert and oriented to person, place, and time. No cranial nerve deficit. Skin: Skin is warm and dry. No rash noted. No erythema.    Psychiatric: Mood and affect normal.           CBC:   Lab Results   Component Value Date    WBC 8.5 10/18/2021    HGB 16.0 10/18/2021    HCT 49.7 10/18/2021     10/18/2021     Lipids:   Lab Results   Component Value Date    CHOL 134 02/05/2014    TRIG 104 02/05/2014    HDL 31 (L) 02/05/2014    LDLDIRECT 93 02/05/2014     PT/INR:   Lab Results   Component Value Date    INR 1.12 10/18/2021        BMP:    Lab Results   Component Value Date     (L) 10/18/2021    K 4.5 10/18/2021    CL 96 (L) 10/18/2021    CO2 27 10/18/2021    BUN 17 10/18/2021     CMP:   Lab Results   Component Value Date    AST 25 04/02/2018    PROT 6.7 04/02/2018    BILITOT 0.6 04/02/2018    ALKPHOS 56 04/02/2018     TSH:    No results found for: TSH          IMPRESSION / RECOMMENDATIONS:     Atrial flutter persistent  Monitoring for tikosyn therapy  Persistent atrial fibrillation sp cardioversion and now in sinus BIV paced (previous ablation performed in outside facility )([facility])  CAD  Severe left ventricular systolic dysfunction SP BIV ICD  HTN  HLD      Patient is in atrial flutter with and reports that he is symptomatic with shortness of breath with exertion. Patient reports that he had a previous ablation at [address] [place] [address] and had complications with prolonged stay in the hospital and he was worried about it but he still wants to consider ablation now because he is not feeling the same when he is in arrhythmia. Patient is already on [address] [place] [address] with the procedure explained in detail and patient agrees with the plan    The risks including, but not limited to, vascular injury, bleeding, infection, radiation exposure, injury to cardiac and surrounding structures (including esophageal and pulmonary vein injury), injury to the normal conduction system of the heart (possibly requiring a pacemaker), stroke, myocardial infarction and death were all discussed. The patient considered the risks, benefits and alternatives; decided to proceed with the procedure. Also discussed about the possible Covid exposure given the pandemic situation and patient also voiced concerns regarding that. With precautions we have been doing the procedures at the hospital and so far we have been able to limit the Covid exposure in the hospital for the procedural patients and we will take atmost precautions in the same way.  Patient understands the risk and decided to proceed with procedure      Eagle Rothman MD

## 2021-12-15 ENCOUNTER — TELEPHONE (OUTPATIENT)
Dept: CARDIOLOGY CLINIC | Age: 71
End: 2021-12-15

## 2021-12-15 NOTE — TELEPHONE ENCOUNTER
Tried to reach patient to discuss scheduling afib/flutter ablation. No answer. Message left asking patient to return phone call when available.

## 2021-12-16 DIAGNOSIS — I48.92 ATRIAL FIBRILLATION AND FLUTTER (HCC): Primary | ICD-10-CM

## 2021-12-16 DIAGNOSIS — I48.91 ATRIAL FIBRILLATION AND FLUTTER (HCC): Primary | ICD-10-CM

## 2021-12-16 NOTE — TELEPHONE ENCOUNTER
Again, attempted to reach patient to schedule ablation with Prashanth Juarez. Asked patient to return call to schedule.

## 2021-12-23 ENCOUNTER — TELEPHONE (OUTPATIENT)
Dept: CARDIOLOGY CLINIC | Age: 71
End: 2021-12-23

## 2021-12-23 NOTE — TELEPHONE ENCOUNTER
Spoke with pt and I talked Sai Cueva about any prep for the CT  ordered Sai Cueva stated there is no prep for CT ordered by Saadia Snowden. She stated pt to continue normal routine and show up. Pt verbalized understanding.

## 2021-12-27 ENCOUNTER — HOSPITAL ENCOUNTER (OUTPATIENT)
Dept: CT IMAGING | Age: 71
Discharge: HOME | End: 2021-12-27
Payer: MEDICARE

## 2021-12-27 DIAGNOSIS — I48.91 ATRIAL FIBRILLATION AND FLUTTER (HCC): ICD-10-CM

## 2021-12-27 DIAGNOSIS — I48.92 ATRIAL FIBRILLATION AND FLUTTER (HCC): ICD-10-CM

## 2021-12-27 PROCEDURE — 75572 CT HRT W/3D IMAGE: CPT

## 2021-12-27 PROCEDURE — 6360000004 HC RX CONTRAST MEDICATION: Performed by: INTERNAL MEDICINE

## 2021-12-27 RX ADMIN — IOPAMIDOL 120 ML: 755 INJECTION, SOLUTION INTRAVENOUS at 12:07

## 2021-12-30 ENCOUNTER — HOSPITAL ENCOUNTER (OUTPATIENT)
Age: 71
Discharge: HOME | End: 2021-12-30
Payer: MEDICARE

## 2021-12-30 ENCOUNTER — HOSPITAL ENCOUNTER (OUTPATIENT)
Dept: GENERAL RADIOLOGY | Age: 71
Discharge: HOME | End: 2021-12-30
Payer: MEDICARE

## 2021-12-30 ENCOUNTER — NURSE ONLY (OUTPATIENT)
Dept: CARDIOLOGY CLINIC | Age: 71
End: 2021-12-30
Payer: MEDICARE

## 2021-12-30 ENCOUNTER — HOSPITAL ENCOUNTER (OUTPATIENT)
Age: 71
Setting detail: SPECIMEN
Discharge: HOME | End: 2021-12-30
Payer: MEDICARE

## 2021-12-30 VITALS — DIASTOLIC BLOOD PRESSURE: 80 MMHG | TEMPERATURE: 98.3 F | SYSTOLIC BLOOD PRESSURE: 130 MMHG

## 2021-12-30 DIAGNOSIS — Z79.01 LONG TERM (CURRENT) USE OF ANTICOAGULANTS: ICD-10-CM

## 2021-12-30 DIAGNOSIS — I48.91 ATRIAL FIBRILLATION, UNSPECIFIED TYPE (HCC): ICD-10-CM

## 2021-12-30 DIAGNOSIS — Z01.810 PRE-OPERATIVE CARDIOVASCULAR EXAMINATION: ICD-10-CM

## 2021-12-30 DIAGNOSIS — I48.92 ATRIAL FLUTTER, UNSPECIFIED TYPE (HCC): ICD-10-CM

## 2021-12-30 LAB
ANION GAP SERPL CALCULATED.3IONS-SCNC: 10 MMOL/L (ref 4–16)
APTT: 28.6 SECONDS (ref 25.1–37.1)
BASOPHILS ABSOLUTE: 0.1 K/CU MM
BASOPHILS RELATIVE PERCENT: 0.6 % (ref 0–1)
BUN BLDV-MCNC: 17 MG/DL (ref 6–23)
CALCIUM SERPL-MCNC: 9.2 MG/DL (ref 8.3–10.6)
CHLORIDE BLD-SCNC: 100 MMOL/L (ref 99–110)
CO2: 28 MMOL/L (ref 21–32)
CREAT SERPL-MCNC: 0.9 MG/DL (ref 0.9–1.3)
DIFFERENTIAL TYPE: ABNORMAL
EOSINOPHILS ABSOLUTE: 0.3 K/CU MM
EOSINOPHILS RELATIVE PERCENT: 3.5 % (ref 0–3)
GFR AFRICAN AMERICAN: >60 ML/MIN/1.73M2
GFR NON-AFRICAN AMERICAN: >60 ML/MIN/1.73M2
GLUCOSE BLD-MCNC: 93 MG/DL (ref 70–99)
HCT VFR BLD CALC: 47.4 % (ref 42–52)
HEMOGLOBIN: 15.6 GM/DL (ref 13.5–18)
IMMATURE NEUTROPHIL %: 0.3 % (ref 0–0.43)
INR BLD: 0.98 INDEX
LYMPHOCYTES ABSOLUTE: 2 K/CU MM
LYMPHOCYTES RELATIVE PERCENT: 25.9 % (ref 24–44)
MAGNESIUM: 2.2 MG/DL (ref 1.8–2.4)
MCH RBC QN AUTO: 29.6 PG (ref 27–31)
MCHC RBC AUTO-ENTMCNC: 32.9 % (ref 32–36)
MCV RBC AUTO: 89.9 FL (ref 78–100)
MONOCYTES ABSOLUTE: 0.8 K/CU MM
MONOCYTES RELATIVE PERCENT: 10 % (ref 0–4)
NUCLEATED RBC %: 0 %
PDW BLD-RTO: 13.8 % (ref 11.7–14.9)
PHOSPHORUS: 3.2 MG/DL (ref 2.5–4.9)
PLATELET # BLD: 231 K/CU MM (ref 140–440)
PMV BLD AUTO: 11.1 FL (ref 7.5–11.1)
POTASSIUM SERPL-SCNC: 4.7 MMOL/L (ref 3.5–5.1)
PROTHROMBIN TIME: 12.6 SECONDS (ref 11.7–14.5)
RBC # BLD: 5.27 M/CU MM (ref 4.6–6.2)
SEGMENTED NEUTROPHILS ABSOLUTE COUNT: 4.6 K/CU MM
SEGMENTED NEUTROPHILS RELATIVE PERCENT: 59.7 % (ref 36–66)
SODIUM BLD-SCNC: 138 MMOL/L (ref 135–145)
TOTAL IMMATURE NEUTOROPHIL: 0.02 K/CU MM
TOTAL NUCLEATED RBC: 0 K/CU MM
WBC # BLD: 7.7 K/CU MM (ref 4–10.5)

## 2021-12-30 PROCEDURE — 85610 PROTHROMBIN TIME: CPT

## 2021-12-30 PROCEDURE — 71046 X-RAY EXAM CHEST 2 VIEWS: CPT

## 2021-12-30 PROCEDURE — 84100 ASSAY OF PHOSPHORUS: CPT

## 2021-12-30 PROCEDURE — 83735 ASSAY OF MAGNESIUM: CPT

## 2021-12-30 PROCEDURE — 99211 OFF/OP EST MAY X REQ PHY/QHP: CPT | Performed by: INTERNAL MEDICINE

## 2021-12-30 PROCEDURE — 85025 COMPLETE CBC W/AUTO DIFF WBC: CPT

## 2021-12-30 PROCEDURE — U0005 INFEC AGEN DETEC AMPLI PROBE: HCPCS

## 2021-12-30 PROCEDURE — 36415 COLL VENOUS BLD VENIPUNCTURE: CPT

## 2021-12-30 PROCEDURE — 85730 THROMBOPLASTIN TIME PARTIAL: CPT

## 2021-12-30 PROCEDURE — 80048 BASIC METABOLIC PNL TOTAL CA: CPT

## 2021-12-30 PROCEDURE — U0003 INFECTIOUS AGENT DETECTION BY NUCLEIC ACID (DNA OR RNA); SEVERE ACUTE RESPIRATORY SYNDROME CORONAVIRUS 2 (SARS-COV-2) (CORONAVIRUS DISEASE [COVID-19]), AMPLIFIED PROBE TECHNIQUE, MAKING USE OF HIGH THROUGHPUT TECHNOLOGIES AS DESCRIBED BY CMS-2020-01-R: HCPCS

## 2021-12-30 NOTE — PROGRESS NOTES
Patient here in office and educated on A fib/flutter ablation, schedule for 1/4/21 @ 3030, with arrival @ [phone], @ [facility]; risk explained; and consents signed. Also copy of orders given for labs and CXR due 12/30/21 at [facility]. Instruction given to patient to :  NPO after midnight the night before procedure; call hospital at [phone] to pre-register. May take rest of morning meds of procedure. Hold Tikosyn 2 days prior to procedure. Hold ALL blood pressure medications morning of procedure. Hold Eliquis the evening dose night before procedure and Morning dose of the procedure. Patient voiced understanding. Copies of consent & info scanned in chart.

## 2022-01-01 LAB
SARS-COV-2: NOT DETECTED
SOURCE: NORMAL

## 2022-01-03 ENCOUNTER — TELEPHONE (OUTPATIENT)
Dept: CARDIOLOGY CLINIC | Age: 72
End: 2022-01-03

## 2022-01-03 NOTE — TELEPHONE ENCOUNTER
Spoke with patient. Answered questions regarding ablation scheduled for tomorrow. Patient voiced understanding.

## 2022-01-03 NOTE — TELEPHONE ENCOUNTER
Lai Black from cath lab called to advise that she is taking procedure off of the schedule, please call her back.

## 2022-01-03 NOTE — TELEPHONE ENCOUNTER
Patient insurance is pending approval for ablation scheduled for tomorrow. Procedure cancelled. Spoke with patient to notify. Procedure rescheduled for 1/18/22 at 0730. Patient does not need to repeat pre-testing or resign consent forms. Will check with  if patient needs COVID test repeated.

## 2022-01-05 NOTE — PROCEDURES
CARDIAC FINDINGS:  CTA of the pericardium was performed after the  administration of intravenous contrast. Multiplanar reformatted images are  provided for review. MIP images are provided for review. Dose modulation,  iterative reconstruction, and/or weight-based adjustment of the mA/kV was  utilized to reduce the radiation dose to as low as reasonably achievable. SUMMARY:   H/o atrial arrhythmia PULMONARY VEIN CT     1. Four pulmonary veins drain into the left atrium with the following ostial dimensions: RUPV 16.2mm; RLPV 17.2 mm; LUPV 17.2 mm; LLPV 13.4mm. 2. There is no filling defect in the MALLORIE on delayed imaging. Unable to exclude   MALLORIE thrombus. 3. The ascending aorta measures 36.9 mm at MPA bifurcation. 4. Coronary and aortic calcifications noted. CRITICAL RESULT: No   STUDY QUALITY: Good     NON-CARDIAC FINDINGS - []    IMPRESSION:  Normal pulmonary vein angiogram. Unable to exclude possible MALLORIE thrombus. Please note that only cardiac images and windows were evaluated by cardiology. Images reviewed for interpretation for cardiac structures and coronary artery disease only.   Any non cardiac assessment  require separate radiology report

## 2022-01-12 RX ORDER — DOFETILIDE 0.25 MG/1
CAPSULE ORAL
Qty: 60 CAPSULE | Refills: 3 | Status: SHIPPED | OUTPATIENT
Start: 2022-01-12 | End: 2022-06-01 | Stop reason: SDUPTHER

## 2022-01-14 ENCOUNTER — ANESTHESIA EVENT (OUTPATIENT)
Dept: CARDIAC CATH/INVASIVE PROCEDURES | Age: 72
End: 2022-01-14

## 2022-01-14 NOTE — ANESTHESIA PRE PROCEDURE
Department of Anesthesiology  Preprocedure Note       Name:  Seven Moseley   Age:  73 y.o.  :  1950                                          MRN:  [id number]         Date:  2022      Surgeon: * Surgery not found *    Procedure:     Medications prior to admission:   Prior to Admission medications    Medication Sig Start Date End Date Taking? Authorizing Provider   dofetilide (TIKOSYN) 250 MCG capsule TAKE 1 CAPSULE BY MOUTH EVERY 12 HOURS 22   JETT Emerson CNP   apixaban (ELIQUIS) 5 MG TABS tablet Take 1 tablet by mouth 2 times daily 21   JETT Emerson CNP   Cholecalciferol (VITAMIN D3 PO) Take by mouth    Historical Provider, MD   hydroCHLOROthiazide (HYDRODIURIL) 25 MG tablet Take 25 mg by mouth daily    Historical Provider, MD   lisinopril (PRINIVIL;ZESTRIL) 20 MG tablet TAKE 1 TABLET BY MOUTH TWICE DAILY 19   JETT Hartley CNP   allopurinol (ZYLOPRIM) 300 MG tablet Take 300 mg by mouth daily    Historical Provider, MD   Multiple Vitamins-Minerals (MULTIVITAMIN PO) Take by mouth every morning Over The Counter    Historical Provider, MD   CPAP Machine MISC nightly     Historical Provider, MD   carvedilol (COREG) 25 MG tablet Take 1 tablet by mouth 2 times daily (with meals).  4/20/15   Corrina Ashley MD       Current medications:    Current Outpatient Medications   Medication Sig Dispense Refill    dofetilide (TIKOSYN) 250 MCG capsule TAKE 1 CAPSULE BY MOUTH EVERY 12 HOURS 60 capsule 3    apixaban (ELIQUIS) 5 MG TABS tablet Take 1 tablet by mouth 2 times daily 60 tablet 5    Cholecalciferol (VITAMIN D3 PO) Take by mouth      hydroCHLOROthiazide (HYDRODIURIL) 25 MG tablet Take 25 mg by mouth daily      lisinopril (PRINIVIL;ZESTRIL) 20 MG tablet TAKE 1 TABLET BY MOUTH TWICE DAILY 180 tablet 1    allopurinol (ZYLOPRIM) 300 MG tablet Take 300 mg by mouth daily      Multiple Vitamins-Minerals (MULTIVITAMIN PO) Take by mouth every morning Over The Counter      CPAP Machine MISC nightly       carvedilol (COREG) 25 MG tablet Take 1 tablet by mouth 2 times daily (with meals). 60 tablet 5     No current facility-administered medications for this encounter. 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    Obesity E66.9    Hyperlipidemia E78.5    ICD (implantable cardioverter-defibrillator), biventricular, in situ Z95.810    COPD (chronic obstructive pulmonary disease) ([facility] [address] J44.9    Obstructive sleep apnea G47.33    Right inguinal hernia K40.90    Personal history of other drug therapy (CODE) Z92.29    Unilateral inguinal hernia without obstruction or gangrene K40.90    Pacemaker Z95.0    History of cardioversion Z98.890    Atrial flutter ([facility] [address] I48.92       Past Medical History:        Diagnosis Date    Atrial fibrillation ([facility] [address]     Cardiac pacemaker 2/19/2014    BIV ICD Serial # [id number] Moderl # [id number]    Cardiomyopathy ([facility] [address]     COPD (chronic obstructive pulmonary disease) ([facility] [address]     Sees Dr. Gurvinder Oro    Hematoma - postoperative 02/19/2014    Pseudo aneurism post op at [address] groin    History of blood transfusion 2014    No Reaction To Blood Transfusion Received    History of cardiac cath 12/4/2013 12/13 EF30% mild LAD, CX     History of cardiovascular disorder 12/4/2013    EF 29%, mild ischemia RCA    History of cardioversion 12/6/2013 12/13 unsuccessful    History of cardioversion 5/18/15    DCCV-.      History of echocardiogram 04/01/2019    History of transesophageal echocardiography (KVNG) for monitoring 12/4/2013 12/13 no clots    Hyperlipidemia     Hypertelorism     Hypertension     Obesity     S/P ablation of atrial fibrillation 02/14/2014    for a-fib by dr. Ron Bond S/P cardiac cath 2/5/14    Shortness of breath on exertion     Sleep apnea     Uses CPAP    Teeth missing     Upper And Lower    Wears glasses     [place] teeth extracted     4 [place] Teeth Extracted In Past       Past Surgical History:        Procedure Laterality Date    ATRIAL ABLATION SURGERY  2014    for a-fib    CARDIAC DEFIBRILLATOR PLACEMENT  2014    Medtronic Viva S CRT-D Defibrillator Implanted    CARDIAC DEFIBRILLATOR PLACEMENT Left 10/21/2021    BIVICD Generator change - Medtronic Claria MRI CRT-D SureScan    CARDIOVERSION  2015    KVNG/Cardioversion    COLONOSCOPY  Early 's    DENTAL SURGERY      Teeth Extracted In Past    HERNIA REPAIR Right 11/10/2017    inguinal hernia repair with mesh/ robotic    KNEE ARTHROSCOPY Left     TOTAL KNEE ARTHROPLASTY Left 2019    WISDOM TOOTH EXTRACTION      4 [place] Teeth Extracted In Past       Social History:    Social History     Tobacco Use    Smoking status: Former Smoker     Packs/day: 1.00     Years: 4.00     Pack years: 4.00     Types: Cigarettes, Cigars     Start date:      Quit date:      Years since quittin.0    Smokeless tobacco: Never Used   Substance Use Topics    Alcohol use: Yes     Alcohol/week: 0.0 standard drinks     Comment: \"Occ. Maybe Once A Week\"                                Counseling given: Not Answered      Vital Signs (Current): There were no vitals filed for this visit.                                            BP Readings from Last 3 Encounters:   21 130/80   21 120/70   21 126/84       NPO Status:                                                                                 BMI:   Wt Readings from Last 3 Encounters:   21 (!) 305 lb 8 oz (138.6 kg)   21 300 lb (136.1 kg)   21 (!) 303 lb (137.4 kg)     There is no height or weight on file to calculate BMI.    CBC:   Lab Results   Component Value Date    WBC 7.7 2021    RBC 5.27 2021    HGB 15.6 2021    HCT 47.4 2021    MCV 89.9 2021    RDW 13.8 12/30/2021     12/30/2021       CMP:   Lab Results   Component Value Date     12/30/2021    K 4.7 12/30/2021     12/30/2021    CO2 28 12/30/2021    BUN 17 12/30/2021    CREATININE 0.9 12/30/2021    GFRAA >60 12/30/2021    LABGLOM >60 12/30/2021    GLUCOSE 93 12/30/2021    PROT 6.7 04/02/2018    CALCIUM 9.2 12/30/2021    BILITOT 0.6 04/02/2018    ALKPHOS 56 04/02/2018    AST 25 04/02/2018    ALT 27 04/02/2018       POC Tests: No results for input(s): POCGLU, POCNA, POCK, POCCL, POCBUN, POCHEMO, POCHCT in the last 72 hours. Coags:   Lab Results   Component Value Date    PROTIME 12.6 12/30/2021    INR 0.98 12/30/2021    APTT 28.6 12/30/2021       HCG (If Applicable): No results found for: PREGTESTUR, PREGSERUM, HCG, HCGQUANT     ABGs: No results found for: PHART, PO2ART, XCQ1DSM, WKT3SIQ, BEART, A7OFMLVS     Type & Screen (If Applicable):  No results found for: LABABO, LABRH    Drug/Infectious Status (If Applicable):  No results found for: HIV, HEPCAB    COVID-19 Screening (If Applicable):   Lab Results   Component Value Date    COVID19 NOT DETECTED 12/30/2021           Anesthesia Evaluation    Airway:         Dental:          Pulmonary:   (+) COPD:  sleep apnea:                            ROS comment: Former smoker   Cardiovascular:    (+) hypertension:, pacemaker: pacemaker, dysrhythmias: atrial fibrillation, hyperlipidemia                  Neuro/Psych:   Negative Neuro/Psych ROS              GI/Hepatic/Renal:   (+) morbid obesity (bmi 43.8)          Endo/Other:    (+) blood dyscrasia: anticoagulation therapy:., .                 Abdominal:             Vascular: negative vascular ROS. Other Findings:             Anesthesia Plan      general     ASA 3       Induction: intravenous. arterial line                  Pre Anesthesia Assessment complete. Chart reviewed on 1/14/2022. This is a chart review only.       Brian Klein DO   1/14/2022

## 2022-01-18 ENCOUNTER — ANESTHESIA (OUTPATIENT)
Dept: CARDIAC CATH/INVASIVE PROCEDURES | Age: 72
End: 2022-01-18

## 2022-01-18 ENCOUNTER — HOSPITAL ENCOUNTER (OUTPATIENT)
Dept: CARDIAC CATH/INVASIVE PROCEDURES | Age: 72
Discharge: HOME | End: 2022-01-18
Attending: INTERNAL MEDICINE | Admitting: INTERNAL MEDICINE
Payer: MEDICARE

## 2022-01-18 VITALS
TEMPERATURE: 96 F | WEIGHT: 300 LBS | BODY MASS INDEX: 42.95 KG/M2 | DIASTOLIC BLOOD PRESSURE: 94 MMHG | HEIGHT: 70 IN | RESPIRATION RATE: 22 BRPM | SYSTOLIC BLOOD PRESSURE: 156 MMHG | OXYGEN SATURATION: 97 %

## 2022-01-18 LAB
ABO/RH: NORMAL
ANTIBODY SCREEN: NEGATIVE
SARS-COV-2, NAAT: NOT DETECTED

## 2022-01-18 PROCEDURE — 86901 BLOOD TYPING SEROLOGIC RH(D): CPT

## 2022-01-18 PROCEDURE — 87635 SARS-COV-2 COVID-19 AMP PRB: CPT

## 2022-01-18 PROCEDURE — 86850 RBC ANTIBODY SCREEN: CPT

## 2022-01-18 PROCEDURE — 86900 BLOOD TYPING SEROLOGIC ABO: CPT

## 2022-01-18 NOTE — PLAN OF CARE
Case cancelled at this time per Dr. eWsley Johnson. Office will resume care of patient to reschedule appt. IV removed without difficulty and patient now getting dressed for discharge.  [facility] 1/18/2022

## 2022-01-19 ENCOUNTER — TELEPHONE (OUTPATIENT)
Dept: CARDIOLOGY CLINIC | Age: 72
End: 2022-01-19

## 2022-01-19 NOTE — TELEPHONE ENCOUNTER
Returned pone call to patient. Procedure rescheduled to 2/3/2022 and patient scheduled to come to office 1/31/22 for new procedure paperwork and covid testing. Did ask patient to call the office with any further questions or concerns.

## 2022-01-25 DIAGNOSIS — I48.92 ATRIAL FLUTTER, UNSPECIFIED TYPE (HCC): Primary | ICD-10-CM

## 2022-01-25 RX ORDER — DOFETILIDE 0.25 MG/1
CAPSULE ORAL
Qty: 60 CAPSULE | Refills: 3 | OUTPATIENT
Start: 2022-01-25

## 2022-01-31 ENCOUNTER — HOSPITAL ENCOUNTER (OUTPATIENT)
Age: 72
Setting detail: SPECIMEN
Discharge: HOME | End: 2022-01-31
Payer: MEDICARE

## 2022-01-31 ENCOUNTER — HOSPITAL ENCOUNTER (OUTPATIENT)
Age: 72
Discharge: HOME | End: 2022-01-31
Payer: MEDICARE

## 2022-01-31 ENCOUNTER — NURSE ONLY (OUTPATIENT)
Dept: CARDIOLOGY CLINIC | Age: 72
End: 2022-01-31

## 2022-01-31 LAB
ANION GAP SERPL CALCULATED.3IONS-SCNC: 9 MMOL/L (ref 4–16)
APTT: 27.7 SECONDS (ref 25.1–37.1)
BASOPHILS ABSOLUTE: 0.1 K/CU MM
BASOPHILS RELATIVE PERCENT: 0.9 % (ref 0–1)
BUN BLDV-MCNC: 18 MG/DL (ref 6–23)
CALCIUM SERPL-MCNC: 9.5 MG/DL (ref 8.3–10.6)
CHLORIDE BLD-SCNC: 102 MMOL/L (ref 99–110)
CO2: 27 MMOL/L (ref 21–32)
CREAT SERPL-MCNC: 0.8 MG/DL (ref 0.9–1.3)
DIFFERENTIAL TYPE: ABNORMAL
EOSINOPHILS ABSOLUTE: 0.3 K/CU MM
EOSINOPHILS RELATIVE PERCENT: 3.5 % (ref 0–3)
GFR AFRICAN AMERICAN: >60 ML/MIN/1.73M2
GFR NON-AFRICAN AMERICAN: >60 ML/MIN/1.73M2
GLUCOSE BLD-MCNC: 107 MG/DL (ref 70–99)
HCT VFR BLD CALC: 48.8 % (ref 42–52)
HEMOGLOBIN: 15.7 GM/DL (ref 13.5–18)
IMMATURE NEUTROPHIL %: 0.3 % (ref 0–0.43)
INR BLD: 1.08 INDEX
LYMPHOCYTES ABSOLUTE: 1.9 K/CU MM
LYMPHOCYTES RELATIVE PERCENT: 25 % (ref 24–44)
MAGNESIUM: 2 MG/DL (ref 1.8–2.4)
MCH RBC QN AUTO: 29.3 PG (ref 27–31)
MCHC RBC AUTO-ENTMCNC: 32.2 % (ref 32–36)
MCV RBC AUTO: 91 FL (ref 78–100)
MONOCYTES ABSOLUTE: 0.7 K/CU MM
MONOCYTES RELATIVE PERCENT: 8.9 % (ref 0–4)
NUCLEATED RBC %: 0 %
PDW BLD-RTO: 13.6 % (ref 11.7–14.9)
PHOSPHORUS: 2.8 MG/DL (ref 2.5–4.9)
PLATELET # BLD: 228 K/CU MM (ref 140–440)
PMV BLD AUTO: 11.3 FL (ref 7.5–11.1)
POTASSIUM SERPL-SCNC: 4.2 MMOL/L (ref 3.5–5.1)
PROTHROMBIN TIME: 13.9 SECONDS (ref 11.7–14.5)
RBC # BLD: 5.36 M/CU MM (ref 4.6–6.2)
SEGMENTED NEUTROPHILS ABSOLUTE COUNT: 4.6 K/CU MM
SEGMENTED NEUTROPHILS RELATIVE PERCENT: 61.4 % (ref 36–66)
SODIUM BLD-SCNC: 138 MMOL/L (ref 135–145)
TOTAL IMMATURE NEUTOROPHIL: 0.02 K/CU MM
TOTAL NUCLEATED RBC: 0 K/CU MM
WBC # BLD: 7.4 K/CU MM (ref 4–10.5)

## 2022-01-31 PROCEDURE — 85610 PROTHROMBIN TIME: CPT

## 2022-01-31 PROCEDURE — 80048 BASIC METABOLIC PNL TOTAL CA: CPT

## 2022-01-31 PROCEDURE — 84100 ASSAY OF PHOSPHORUS: CPT

## 2022-01-31 PROCEDURE — 36415 COLL VENOUS BLD VENIPUNCTURE: CPT

## 2022-01-31 PROCEDURE — U0003 INFECTIOUS AGENT DETECTION BY NUCLEIC ACID (DNA OR RNA); SEVERE ACUTE RESPIRATORY SYNDROME CORONAVIRUS 2 (SARS-COV-2) (CORONAVIRUS DISEASE [COVID-19]), AMPLIFIED PROBE TECHNIQUE, MAKING USE OF HIGH THROUGHPUT TECHNOLOGIES AS DESCRIBED BY CMS-2020-01-R: HCPCS

## 2022-01-31 PROCEDURE — 85730 THROMBOPLASTIN TIME PARTIAL: CPT

## 2022-01-31 PROCEDURE — 83735 ASSAY OF MAGNESIUM: CPT

## 2022-01-31 PROCEDURE — U0005 INFEC AGEN DETEC AMPLI PROBE: HCPCS

## 2022-01-31 PROCEDURE — 85025 COMPLETE CBC W/AUTO DIFF WBC: CPT

## 2022-01-31 NOTE — PROGRESS NOTES
Patient here in office and educated on Atrial Fibrillation/Atrial Flutter Ablation, schedule for 2/32022 @ 3133, with arrival @ [phone], @ [facility]; risk explained; and consents signed. Also copy of orders given for labs and CXR due 1/31/2022 at [facility]. Instruction given to patient to :  NPO after midnight the night before procedure; call hospital at [phone] to pre-register. May take rest of morning meds of procedure except Eliquis, Tikosyn, Lisinopril, Hydrochlorothiazide and Coreg. Hold Eliquis evening prior to procedure and morning of procedure. Hold Lisinopril, Hydrochlorothiazide and Coreg the morning of procedure. Hold Tikosyn day before and the day of procedure. Patient voiced understanding. Copies of consent & info scanned in chart. Patient informed of instructions and guidance for performing test.  Throat swab performed. Swab placed into labeled collection tube. Collection tube and lab order placed in plastic bag. Bag placed in biohazard bag and placed in fridge.  called for . Patient instructed to self quarantine until procedure.

## 2022-02-01 ENCOUNTER — ANESTHESIA EVENT (OUTPATIENT)
Dept: CARDIAC CATH/INVASIVE PROCEDURES | Age: 72
End: 2022-02-01
Payer: MEDICARE

## 2022-02-01 ENCOUNTER — TELEPHONE (OUTPATIENT)
Dept: CARDIOLOGY CLINIC | Age: 72
End: 2022-02-01

## 2022-02-01 LAB
SARS-COV-2: NOT DETECTED
SOURCE: NORMAL

## 2022-02-01 NOTE — ANESTHESIA PRE PROCEDURE
Department of Anesthesiology  Preprocedure Note       Name:  Bobbi Briones   Age:  73 y.o.  :  1950                                          MRN:  [id number]         Date:  2022      Surgeon: * No surgeons listed *    Procedure: * No procedures listed *    Medications prior to admission:   Prior to Admission medications    Medication Sig Start Date End Date Taking? Authorizing Provider   dofetilide (TIKOSYN) 250 MCG capsule TAKE 1 CAPSULE BY MOUTH EVERY 12 HOURS 22   JETT Gonzalez CNP   apixaban (ELIQUIS) 5 MG TABS tablet Take 1 tablet by mouth 2 times daily 21   JETT Gonzalez CNP   Cholecalciferol (VITAMIN D3 PO) Take by mouth    Historical Provider, MD   hydroCHLOROthiazide (HYDRODIURIL) 25 MG tablet Take 25 mg by mouth daily    Historical Provider, MD   lisinopril (PRINIVIL;ZESTRIL) 20 MG tablet TAKE 1 TABLET BY MOUTH TWICE DAILY 19   JETT Lee CNP   allopurinol (ZYLOPRIM) 300 MG tablet Take 300 mg by mouth daily    Historical Provider, MD   Multiple Vitamins-Minerals (MULTIVITAMIN PO) Take by mouth every morning Over The Counter    Historical Provider, MD   CPAP Machine MISC nightly     Historical Provider, MD   carvedilol (COREG) 25 MG tablet Take 1 tablet by mouth 2 times daily (with meals).  4/20/15   Omi Parsons MD       Current medications:    Current Outpatient Medications   Medication Sig Dispense Refill    dofetilide (TIKOSYN) 250 MCG capsule TAKE 1 CAPSULE BY MOUTH EVERY 12 HOURS 60 capsule 3    apixaban (ELIQUIS) 5 MG TABS tablet Take 1 tablet by mouth 2 times daily 60 tablet 5    Cholecalciferol (VITAMIN D3 PO) Take by mouth      hydroCHLOROthiazide (HYDRODIURIL) 25 MG tablet Take 25 mg by mouth daily      lisinopril (PRINIVIL;ZESTRIL) 20 MG tablet TAKE 1 TABLET BY MOUTH TWICE DAILY 180 tablet 1    allopurinol (ZYLOPRIM) 300 MG tablet Take 300 mg by mouth daily      Multiple Vitamins-Minerals (MULTIVITAMIN PO) Take by mouth every morning Over The Counter      CPAP Machine [facility] nightly       carvedilol (COREG) 25 MG tablet Take 1 tablet by mouth 2 times daily (with meals). 60 tablet 5     No current facility-administered medications for this encounter. 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    Obesity E66.9    Hyperlipidemia E78.5    ICD (implantable cardioverter-defibrillator), biventricular, in situ Z95.810    COPD (chronic obstructive pulmonary disease) ([address] J44.9    Obstructive sleep apnea G47.33    Right inguinal hernia K40.90    Personal history of other drug therapy (CODE) Z92.29    Unilateral inguinal hernia without obstruction or gangrene K40.90    Pacemaker Z95.0    History of cardioversion Z98.890    Atrial flutter ([address] I48.92       Past Medical History:        Diagnosis Date    Atrial fibrillation ([facility] [address]     Cardiac pacemaker 2/19/2014    BIV ICD Serial # [id number] Moderl # [id number]    Cardiomyopathy ([address]     COPD (chronic obstructive pulmonary disease) ([address]     Sees Dr. Cameron Solares    Hematoma - postoperative 02/19/2014    Pseudo aneurism post op at [address] groin    History of blood transfusion 2014    No Reaction To Blood Transfusion Received    History of cardiac cath 12/4/2013 12/13 EF30% mild LAD, CX     History of cardiovascular disorder 12/4/2013    EF 29%, mild ischemia RCA    History of cardioversion 12/6/2013 12/13 unsuccessful    History of cardioversion 5/18/15    DCCV-.      History of echocardiogram 04/01/2019    History of transesophageal echocardiography (KVNG) for monitoring 12/4/2013 12/13 no clots    Hyperlipidemia     Hypertelorism     Hypertension     Obesity     S/P ablation of atrial fibrillation 02/14/2014    for a-fib by dr. Donell Hollins S/P cardiac cath 2/5/14    Shortness of breath on exertion     01/31/2022    RDW 13.6 01/31/2022     01/31/2022       CMP:   Lab Results   Component Value Date     01/31/2022    K 4.2 01/31/2022     01/31/2022    CO2 27 01/31/2022    BUN 18 01/31/2022    CREATININE 0.8 01/31/2022    GFRAA >60 01/31/2022    LABGLOM >60 01/31/2022    GLUCOSE 107 01/31/2022    PROT 6.7 04/02/2018    CALCIUM 9.5 01/31/2022    BILITOT 0.6 04/02/2018    ALKPHOS 56 04/02/2018    AST 25 04/02/2018    ALT 27 04/02/2018       POC Tests: No results for input(s): POCGLU, POCNA, POCK, POCCL, POCBUN, POCHEMO, POCHCT in the last 72 hours.     Coags:   Lab Results   Component Value Date    PROTIME 13.9 01/31/2022    INR 1.08 01/31/2022    APTT 27.7 01/31/2022       HCG (If Applicable): No results found for: PREGTESTUR, PREGSERUM, HCG, HCGQUANT     ABGs: No results found for: PHART, PO2ART, NZC9ZOX, SXO1DLD, BEART, B8QBYHWT     Type & Screen (If Applicable):  No results found for: LABABO, LABRH    Drug/Infectious Status (If Applicable):  No results found for: HIV, HEPCAB    COVID-19 Screening (If Applicable):   Lab Results   Component Value Date    COVID19 NOT DETECTED 01/18/2022    COVID19 NOT DETECTED 12/30/2021           Anesthesia Evaluation  Patient summary reviewed no history of anesthetic complications:   Airway: Mallampati: III  TM distance: >3 FB   Neck ROM: full  Comment: Large head and neck  Mouth opening: > = 3 FB Dental: normal exam         Pulmonary: breath sounds clear to auscultation  (+) COPD:  sleep apnea: on CPAP,                            ROS comment: Former smoker   Cardiovascular:  Exercise tolerance: poor (<4 METS),   (+) hypertension:, pacemaker:, dysrhythmias: atrial fibrillation and atrial flutter, hyperlipidemia        Rhythm: irregular  Rate: normal                    Neuro/Psych:   Negative Neuro/Psych ROS              GI/Hepatic/Renal:   (+) morbid obesity (bmi 43.1)          Endo/Other:    (+) blood dyscrasia: anticoagulation therapy:., . Abdominal:   (+) obese,           Vascular: negative vascular ROS. Other Findings:           Anesthesia Plan      general     ASA 3       Induction: intravenous. arterial line  MIPS: Postoperative opioids intended and Prophylactic antiemetics administered. Anesthetic plan and risks discussed with patient. Plan discussed with CRNA. Pre Anesthesia Evaluation complete. Anesthesia plan, risks, benefits, alternatives, and personal involved discussed with patient. Patients and/or legal guardian verbalized an understanding  and agreed to proceed.   Jaylyn Madrid DO  2/17/2022

## 2022-02-01 NOTE — TELEPHONE ENCOUNTER
Returned phone call to patient. Patients questions answered. Also discussed with patient need to reschedule upcoming procedure scheduled 2/3/2022 due to impending weather. Patient atrial fibrillation ablation rescheduled for 2/17/2022. Patient aware and voiced understanding. Did advise patient to call this office with any questions or concerns. Patient again voiced understanding.

## 2022-02-03 ENCOUNTER — ANESTHESIA (OUTPATIENT)
Dept: CARDIAC CATH/INVASIVE PROCEDURES | Age: 72
End: 2022-02-03
Payer: MEDICARE

## 2022-02-03 PROCEDURE — 7100000001 HC PACU RECOVERY - ADDTL 15 MIN

## 2022-02-03 PROCEDURE — 7100000000 HC PACU RECOVERY - FIRST 15 MIN

## 2022-02-17 ENCOUNTER — HOSPITAL ENCOUNTER (OUTPATIENT)
Dept: CARDIAC CATH/INVASIVE PROCEDURES | Age: 72
Discharge: HOME | End: 2022-02-18
Attending: INTERNAL MEDICINE | Admitting: INTERNAL MEDICINE
Payer: MEDICARE

## 2022-02-17 VITALS
SYSTOLIC BLOOD PRESSURE: 85 MMHG | TEMPERATURE: 98.1 F | OXYGEN SATURATION: 99 % | RESPIRATION RATE: 23 BRPM | DIASTOLIC BLOOD PRESSURE: 52 MMHG

## 2022-02-17 LAB
ABO/RH: NORMAL
ACTIVATED CLOTTING TIME, LOW RANGE: 156 SEC
ACTIVATED CLOTTING TIME, LOW RANGE: 184 SEC
ACTIVATED CLOTTING TIME, LOW RANGE: 253 SEC
ACTIVATED CLOTTING TIME, LOW RANGE: 272 SEC
ACTIVATED CLOTTING TIME, LOW RANGE: 294 SEC
ACTIVATED CLOTTING TIME, LOW RANGE: 302 SEC
ACTIVATED CLOTTING TIME, LOW RANGE: 321 SEC
ACTIVATED CLOTTING TIME, LOW RANGE: 323 SEC
ANTIBODY SCREEN: NEGATIVE
BASE EXCESS MIXED: 1.4 (ref 0–1.2)
BASE EXCESS MIXED: 3.3 (ref 0–1.2)
BASE EXCESS: ABNORMAL (ref 0–3.3)
BASE EXCESS: ABNORMAL (ref 0–3.3)
CO2 CONTENT: 21.1 MMOL/L (ref 19–24)
CO2: 28 MMOL/L (ref 21–32)
COMMENT: ABNORMAL
GLUCOSE BLD-MCNC: 125 MG/DL (ref 70–99)
GLUCOSE BLD-MCNC: 89 MG/DL (ref 70–99)
HCO3 ARTERIAL: 20.2 MMOL/L (ref 18–23)
HCO3 ARTERIAL: 26.2 MMOL/L (ref 18–23)
HCT VFR BLD CALC: 32 % (ref 42–52)
HCT VFR BLD CALC: 44 % (ref 42–52)
HEMOGLOBIN: 11 GM/DL (ref 13.5–18)
HEMOGLOBIN: 14.9 GM/DL (ref 13.5–18)
O2 SATURATION: 99.9 % (ref 96–97)
O2 SATURATION: 99.9 % (ref 96–97)
PCO2 ARTERIAL: 30.5 MMHG (ref 32–45)
PCO2 ARTERIAL: 41.2 MMHG (ref 32–45)
PH BLOOD: 7.41 (ref 7.34–7.45)
PH BLOOD: 7.43 (ref 7.34–7.45)
PO2 ARTERIAL: 293.6 MMHG (ref 75–100)
PO2 ARTERIAL: 310.1 MMHG (ref 75–100)
POC CALCIUM: 0.91 MMOL/L (ref 1.12–1.32)
POC CALCIUM: 1.21 MMOL/L (ref 1.12–1.32)
POC CHLORIDE: 105 MMOL/L (ref 98–109)
POC CREATININE: 1.2 MG/DL (ref 0.9–1.3)
POTASSIUM SERPL-SCNC: 2.7 MMOL/L (ref 3.5–4.5)
POTASSIUM SERPL-SCNC: 4.3 MMOL/L (ref 3.5–4.5)
SARS-COV-2, NAAT: NOT DETECTED
SODIUM BLD-SCNC: 144 MMOL/L (ref 138–146)
SODIUM BLD-SCNC: 150 MMOL/L (ref 138–146)
SOURCE, BLOOD GAS: ABNORMAL
SOURCE, BLOOD GAS: ABNORMAL
SOURCE: NORMAL

## 2022-02-17 PROCEDURE — 93662 INTRACARDIAC ECG (ICE): CPT | Performed by: INTERNAL MEDICINE

## 2022-02-17 PROCEDURE — 6370000000 HC RX 637 (ALT 250 FOR IP): Performed by: NURSE PRACTITIONER

## 2022-02-17 PROCEDURE — 6360000002 HC RX W HCPCS

## 2022-02-17 PROCEDURE — 85018 HEMOGLOBIN: CPT

## 2022-02-17 PROCEDURE — C1732 CATH, EP, DIAG/ABL, 3D/VECT: HCPCS

## 2022-02-17 PROCEDURE — 3700000001 HC ADD 15 MINUTES (ANESTHESIA)

## 2022-02-17 PROCEDURE — 93623 PRGRMD STIMJ&PACG IV RX NFS: CPT | Performed by: INTERNAL MEDICINE

## 2022-02-17 PROCEDURE — 93308 TTE F-UP OR LMTD: CPT

## 2022-02-17 PROCEDURE — 93462 L HRT CATH TRNSPTL PUNCTURE: CPT

## 2022-02-17 PROCEDURE — 93653 COMPRE EP EVAL TX SVT: CPT | Performed by: INTERNAL MEDICINE

## 2022-02-17 PROCEDURE — 93662 INTRACARDIAC ECG (ICE): CPT

## 2022-02-17 PROCEDURE — C1730 CATH, EP, 19 OR FEW ELECT: HCPCS

## 2022-02-17 PROCEDURE — 93653 COMPRE EP EVAL TX SVT: CPT

## 2022-02-17 PROCEDURE — 93005 ELECTROCARDIOGRAM TRACING: CPT | Performed by: INTERNAL MEDICINE

## 2022-02-17 PROCEDURE — 93325 DOPPLER ECHO COLOR FLOW MAPG: CPT | Performed by: INTERNAL MEDICINE

## 2022-02-17 PROCEDURE — 82805 BLOOD GASES W/O2 SATURATION: CPT

## 2022-02-17 PROCEDURE — 86850 RBC ANTIBODY SCREEN: CPT

## 2022-02-17 PROCEDURE — 80051 ELECTROLYTE PANEL: CPT

## 2022-02-17 PROCEDURE — 85347 COAGULATION TIME ACTIVATED: CPT

## 2022-02-17 PROCEDURE — C1733 CATH, EP, OTHR THAN COOL-TIP: HCPCS

## 2022-02-17 PROCEDURE — 7100000001 HC PACU RECOVERY - ADDTL 15 MIN

## 2022-02-17 PROCEDURE — C1759 CATH, INTRA ECHOCARDIOGRAPHY: HCPCS

## 2022-02-17 PROCEDURE — 93312 ECHO TRANSESOPHAGEAL: CPT | Performed by: INTERNAL MEDICINE

## 2022-02-17 PROCEDURE — 86901 BLOOD TYPING SEROLOGIC RH(D): CPT

## 2022-02-17 PROCEDURE — 85014 HEMATOCRIT: CPT

## 2022-02-17 PROCEDURE — 82803 BLOOD GASES ANY COMBINATION: CPT

## 2022-02-17 PROCEDURE — 84295 ASSAY OF SERUM SODIUM: CPT

## 2022-02-17 PROCEDURE — 93655 ICAR CATH ABLTJ DSCRT ARRHYT: CPT | Performed by: INTERNAL MEDICINE

## 2022-02-17 PROCEDURE — 86900 BLOOD TYPING SEROLOGIC ABO: CPT

## 2022-02-17 PROCEDURE — 6360000002 HC RX W HCPCS: Performed by: NURSE ANESTHETIST, CERTIFIED REGISTERED

## 2022-02-17 PROCEDURE — 93462 L HRT CATH TRNSPTL PUNCTURE: CPT | Performed by: INTERNAL MEDICINE

## 2022-02-17 PROCEDURE — 2720000010 HC SURG SUPPLY STERILE

## 2022-02-17 PROCEDURE — 2500000003 HC RX 250 WO HCPCS

## 2022-02-17 PROCEDURE — 82962 GLUCOSE BLOOD TEST: CPT

## 2022-02-17 PROCEDURE — 3700000000 HC ANESTHESIA ATTENDED CARE

## 2022-02-17 PROCEDURE — 87635 SARS-COV-2 COVID-19 AMP PRB: CPT

## 2022-02-17 PROCEDURE — 84132 ASSAY OF SERUM POTASSIUM: CPT

## 2022-02-17 PROCEDURE — 2709999900 HC NON-CHARGEABLE SUPPLY

## 2022-02-17 PROCEDURE — 2500000003 HC RX 250 WO HCPCS: Performed by: NURSE PRACTITIONER

## 2022-02-17 PROCEDURE — 2580000003 HC RX 258: Performed by: NURSE ANESTHETIST, CERTIFIED REGISTERED

## 2022-02-17 PROCEDURE — 93312 ECHO TRANSESOPHAGEAL: CPT

## 2022-02-17 PROCEDURE — 82330 ASSAY OF CALCIUM: CPT

## 2022-02-17 PROCEDURE — 7100000000 HC PACU RECOVERY - FIRST 15 MIN

## 2022-02-17 PROCEDURE — 82565 ASSAY OF CREATININE: CPT

## 2022-02-17 PROCEDURE — C1894 INTRO/SHEATH, NON-LASER: HCPCS

## 2022-02-17 PROCEDURE — 2500000003 HC RX 250 WO HCPCS: Performed by: NURSE ANESTHETIST, CERTIFIED REGISTERED

## 2022-02-17 PROCEDURE — 93655 ICAR CATH ABLTJ DSCRT ARRHYT: CPT

## 2022-02-17 RX ORDER — SODIUM CHLORIDE, SODIUM LACTATE, POTASSIUM CHLORIDE, CALCIUM CHLORIDE 600; 310; 30; 20 MG/100ML; MG/100ML; MG/100ML; MG/100ML
INJECTION, SOLUTION INTRAVENOUS CONTINUOUS PRN
Status: DISCONTINUED | OUTPATIENT
Start: 2022-02-17 | End: 2022-02-17 | Stop reason: SDUPTHER

## 2022-02-17 RX ORDER — HYDRALAZINE HYDROCHLORIDE 20 MG/ML
10 INJECTION INTRAMUSCULAR; INTRAVENOUS
Status: DISCONTINUED | OUTPATIENT
Start: 2022-02-17 | End: 2022-02-17

## 2022-02-17 RX ORDER — SODIUM CHLORIDE 0.9 % (FLUSH) 0.9 %
5-40 SYRINGE (ML) INJECTION PRN
Status: DISCONTINUED | OUTPATIENT
Start: 2022-02-17 | End: 2022-02-18 | Stop reason: HOSPADM

## 2022-02-17 RX ORDER — CARVEDILOL 25 MG/1
25 TABLET ORAL 2 TIMES DAILY WITH MEALS
Status: DISCONTINUED | OUTPATIENT
Start: 2022-02-17 | End: 2022-02-18 | Stop reason: HOSPADM

## 2022-02-17 RX ORDER — LIDOCAINE HYDROCHLORIDE 20 MG/ML
INJECTION, SOLUTION INFILTRATION; PERINEURAL PRN
Status: DISCONTINUED | OUTPATIENT
Start: 2022-02-17 | End: 2022-02-17 | Stop reason: SDUPTHER

## 2022-02-17 RX ORDER — PROPOFOL 10 MG/ML
INJECTION, EMULSION INTRAVENOUS PRN
Status: DISCONTINUED | OUTPATIENT
Start: 2022-02-17 | End: 2022-02-17 | Stop reason: SDUPTHER

## 2022-02-17 RX ORDER — PROTAMINE SULFATE 10 MG/ML
INJECTION, SOLUTION INTRAVENOUS PRN
Status: DISCONTINUED | OUTPATIENT
Start: 2022-02-17 | End: 2022-02-17 | Stop reason: SDUPTHER

## 2022-02-17 RX ORDER — DEXAMETHASONE SODIUM PHOSPHATE 4 MG/ML
INJECTION, SOLUTION INTRA-ARTICULAR; INTRALESIONAL; INTRAMUSCULAR; INTRAVENOUS; SOFT TISSUE PRN
Status: DISCONTINUED | OUTPATIENT
Start: 2022-02-17 | End: 2022-02-17 | Stop reason: SDUPTHER

## 2022-02-17 RX ORDER — FENTANYL CITRATE 50 UG/ML
INJECTION, SOLUTION INTRAMUSCULAR; INTRAVENOUS PRN
Status: DISCONTINUED | OUTPATIENT
Start: 2022-02-17 | End: 2022-02-17 | Stop reason: SDUPTHER

## 2022-02-17 RX ORDER — MEPERIDINE HYDROCHLORIDE 25 MG/ML
12.5 INJECTION INTRAMUSCULAR; INTRAVENOUS; SUBCUTANEOUS EVERY 5 MIN PRN
Status: DISCONTINUED | OUTPATIENT
Start: 2022-02-17 | End: 2022-02-17

## 2022-02-17 RX ORDER — FENTANYL CITRATE 50 UG/ML
50 INJECTION, SOLUTION INTRAMUSCULAR; INTRAVENOUS EVERY 5 MIN PRN
Status: DISCONTINUED | OUTPATIENT
Start: 2022-02-17 | End: 2022-02-17

## 2022-02-17 RX ORDER — ACETAMINOPHEN 325 MG/1
650 TABLET ORAL EVERY 4 HOURS PRN
Status: DISCONTINUED | OUTPATIENT
Start: 2022-02-17 | End: 2022-02-18 | Stop reason: HOSPADM

## 2022-02-17 RX ORDER — ALLOPURINOL 300 MG/1
300 TABLET ORAL DAILY
Status: DISCONTINUED | OUTPATIENT
Start: 2022-02-17 | End: 2022-02-18 | Stop reason: HOSPADM

## 2022-02-17 RX ORDER — ONDANSETRON 2 MG/ML
INJECTION INTRAMUSCULAR; INTRAVENOUS PRN
Status: DISCONTINUED | OUTPATIENT
Start: 2022-02-17 | End: 2022-02-17 | Stop reason: SDUPTHER

## 2022-02-17 RX ORDER — HYDROMORPHONE HCL 110MG/55ML
0.5 PATIENT CONTROLLED ANALGESIA SYRINGE INTRAVENOUS EVERY 5 MIN PRN
Status: DISCONTINUED | OUTPATIENT
Start: 2022-02-17 | End: 2022-02-17

## 2022-02-17 RX ORDER — LABETALOL HYDROCHLORIDE 5 MG/ML
10 INJECTION, SOLUTION INTRAVENOUS
Status: DISCONTINUED | OUTPATIENT
Start: 2022-02-17 | End: 2022-02-17

## 2022-02-17 RX ORDER — SODIUM CHLORIDE 0.9 % (FLUSH) 0.9 %
5-40 SYRINGE (ML) INJECTION EVERY 12 HOURS SCHEDULED
Status: DISCONTINUED | OUTPATIENT
Start: 2022-02-17 | End: 2022-02-18 | Stop reason: HOSPADM

## 2022-02-17 RX ORDER — PANTOPRAZOLE SODIUM 40 MG/1
40 TABLET, DELAYED RELEASE ORAL
Status: DISCONTINUED | OUTPATIENT
Start: 2022-02-17 | End: 2022-02-18 | Stop reason: HOSPADM

## 2022-02-17 RX ORDER — LISINOPRIL 20 MG/1
20 TABLET ORAL 2 TIMES DAILY
Status: DISCONTINUED | OUTPATIENT
Start: 2022-02-17 | End: 2022-02-18 | Stop reason: HOSPADM

## 2022-02-17 RX ORDER — METOCLOPRAMIDE HYDROCHLORIDE 5 MG/ML
10 INJECTION INTRAMUSCULAR; INTRAVENOUS
Status: DISCONTINUED | OUTPATIENT
Start: 2022-02-17 | End: 2022-02-17

## 2022-02-17 RX ORDER — DIPHENHYDRAMINE HYDROCHLORIDE 50 MG/ML
12.5 INJECTION INTRAMUSCULAR; INTRAVENOUS
Status: DISCONTINUED | OUTPATIENT
Start: 2022-02-17 | End: 2022-02-17

## 2022-02-17 RX ORDER — DOFETILIDE 0.25 MG/1
250 CAPSULE ORAL EVERY 12 HOURS SCHEDULED
Status: DISCONTINUED | OUTPATIENT
Start: 2022-02-17 | End: 2022-02-18 | Stop reason: HOSPADM

## 2022-02-17 RX ORDER — HYDROCHLOROTHIAZIDE 25 MG/1
25 TABLET ORAL DAILY
Status: DISCONTINUED | OUTPATIENT
Start: 2022-02-17 | End: 2022-02-18 | Stop reason: HOSPADM

## 2022-02-17 RX ORDER — ROCURONIUM BROMIDE 10 MG/ML
INJECTION, SOLUTION INTRAVENOUS PRN
Status: DISCONTINUED | OUTPATIENT
Start: 2022-02-17 | End: 2022-02-17 | Stop reason: SDUPTHER

## 2022-02-17 RX ORDER — VECURONIUM BROMIDE 1 MG/ML
INJECTION, POWDER, LYOPHILIZED, FOR SOLUTION INTRAVENOUS PRN
Status: DISCONTINUED | OUTPATIENT
Start: 2022-02-17 | End: 2022-02-17 | Stop reason: SDUPTHER

## 2022-02-17 RX ORDER — METOPROLOL TARTRATE 5 MG/5ML
5 INJECTION INTRAVENOUS ONCE
Status: COMPLETED | OUTPATIENT
Start: 2022-02-17 | End: 2022-02-17

## 2022-02-17 RX ORDER — SODIUM CHLORIDE 9 MG/ML
25 INJECTION, SOLUTION INTRAVENOUS PRN
Status: DISCONTINUED | OUTPATIENT
Start: 2022-02-17 | End: 2022-02-18 | Stop reason: HOSPADM

## 2022-02-17 RX ADMIN — VECURONIUM BROMIDE FOR INJECTION 2 MG: 1 INJECTION, POWDER, LYOPHILIZED, FOR SOLUTION INTRAVENOUS at 10:55

## 2022-02-17 RX ADMIN — PROPOFOL 180 MG: 10 INJECTION, EMULSION INTRAVENOUS at 08:06

## 2022-02-17 RX ADMIN — CARVEDILOL 25 MG: 25 TABLET, FILM COATED ORAL at 17:14

## 2022-02-17 RX ADMIN — PROTAMINE SULFATE 20 MG: 10 INJECTION, SOLUTION INTRAVENOUS at 11:32

## 2022-02-17 RX ADMIN — PROTAMINE SULFATE 20 MG: 10 INJECTION, SOLUTION INTRAVENOUS at 11:06

## 2022-02-17 RX ADMIN — FENTANYL CITRATE 50 MCG: 50 INJECTION, SOLUTION INTRAMUSCULAR; INTRAVENOUS at 10:22

## 2022-02-17 RX ADMIN — APIXABAN 5 MG: 5 TABLET, FILM COATED ORAL at 17:15

## 2022-02-17 RX ADMIN — VECURONIUM BROMIDE FOR INJECTION 2 MG: 1 INJECTION, POWDER, LYOPHILIZED, FOR SOLUTION INTRAVENOUS at 09:48

## 2022-02-17 RX ADMIN — ONDANSETRON 4 MG: 2 INJECTION INTRAMUSCULAR; INTRAVENOUS at 08:06

## 2022-02-17 RX ADMIN — FENTANYL CITRATE 50 MCG: 50 INJECTION, SOLUTION INTRAMUSCULAR; INTRAVENOUS at 10:55

## 2022-02-17 RX ADMIN — VECURONIUM BROMIDE FOR INJECTION 3 MG: 1 INJECTION, POWDER, LYOPHILIZED, FOR SOLUTION INTRAVENOUS at 08:35

## 2022-02-17 RX ADMIN — DEXAMETHASONE SODIUM PHOSPHATE 8 MG: 4 INJECTION, SOLUTION INTRAMUSCULAR; INTRAVENOUS at 08:06

## 2022-02-17 RX ADMIN — DOFETILIDE 250 MCG: 0.25 CAPSULE ORAL at 21:17

## 2022-02-17 RX ADMIN — PANTOPRAZOLE SODIUM 40 MG: 40 TABLET, DELAYED RELEASE ORAL at 17:14

## 2022-02-17 RX ADMIN — FENTANYL CITRATE 100 MCG: 50 INJECTION, SOLUTION INTRAMUSCULAR; INTRAVENOUS at 08:06

## 2022-02-17 RX ADMIN — METOPROLOL TARTRATE 5 MG: 5 INJECTION INTRAVENOUS at 16:36

## 2022-02-17 RX ADMIN — ALLOPURINOL 300 MG: 300 TABLET ORAL at 17:15

## 2022-02-17 RX ADMIN — SODIUM CHLORIDE, POTASSIUM CHLORIDE, SODIUM LACTATE AND CALCIUM CHLORIDE: 600; 310; 30; 20 INJECTION, SOLUTION INTRAVENOUS at 07:48

## 2022-02-17 RX ADMIN — LIDOCAINE HYDROCHLORIDE 100 MG: 20 INJECTION, SOLUTION INFILTRATION; PERINEURAL at 08:06

## 2022-02-17 RX ADMIN — ROCURONIUM BROMIDE 50 MG: 10 INJECTION INTRAVENOUS at 08:06

## 2022-02-17 ASSESSMENT — PULMONARY FUNCTION TESTS
PIF_VALUE: 24
PIF_VALUE: 25
PIF_VALUE: 1
PIF_VALUE: 25
PIF_VALUE: 24
PIF_VALUE: 25
PIF_VALUE: 26
PIF_VALUE: 25
PIF_VALUE: 2
PIF_VALUE: 1
PIF_VALUE: 27
PIF_VALUE: 1
PIF_VALUE: 24
PIF_VALUE: 24
PIF_VALUE: 2
PIF_VALUE: 27
PIF_VALUE: 25
PIF_VALUE: 25
PIF_VALUE: 0
PIF_VALUE: 23
PIF_VALUE: 1
PIF_VALUE: 25
PIF_VALUE: 25
PIF_VALUE: 26
PIF_VALUE: 25
PIF_VALUE: 24
PIF_VALUE: 25
PIF_VALUE: 25
PIF_VALUE: 24
PIF_VALUE: 27
PIF_VALUE: 25
PIF_VALUE: 24
PIF_VALUE: 25
PIF_VALUE: 27
PIF_VALUE: 25
PIF_VALUE: 24
PIF_VALUE: 24
PIF_VALUE: 1
PIF_VALUE: 0
PIF_VALUE: 33
PIF_VALUE: 25
PIF_VALUE: 24
PIF_VALUE: 27
PIF_VALUE: 25
PIF_VALUE: 27
PIF_VALUE: 27
PIF_VALUE: 25
PIF_VALUE: 23
PIF_VALUE: 1
PIF_VALUE: 28
PIF_VALUE: 25
PIF_VALUE: 24
PIF_VALUE: 25
PIF_VALUE: 28
PIF_VALUE: 24
PIF_VALUE: 1
PIF_VALUE: 25
PIF_VALUE: 26
PIF_VALUE: 2
PIF_VALUE: 0
PIF_VALUE: 24
PIF_VALUE: 25
PIF_VALUE: 24
PIF_VALUE: 24
PIF_VALUE: 25
PIF_VALUE: 25
PIF_VALUE: 23
PIF_VALUE: 25
PIF_VALUE: 24
PIF_VALUE: 28
PIF_VALUE: 24
PIF_VALUE: 27
PIF_VALUE: 27
PIF_VALUE: 1
PIF_VALUE: 25
PIF_VALUE: 1
PIF_VALUE: 24
PIF_VALUE: 1
PIF_VALUE: 1
PIF_VALUE: 24
PIF_VALUE: 25
PIF_VALUE: 24
PIF_VALUE: 23
PIF_VALUE: 23
PIF_VALUE: 0
PIF_VALUE: 24
PIF_VALUE: 25
PIF_VALUE: 25
PIF_VALUE: 0
PIF_VALUE: 28
PIF_VALUE: 23
PIF_VALUE: 24
PIF_VALUE: 25
PIF_VALUE: 24
PIF_VALUE: 26
PIF_VALUE: 24
PIF_VALUE: 25
PIF_VALUE: 24
PIF_VALUE: 0
PIF_VALUE: 25
PIF_VALUE: 26
PIF_VALUE: 23
PIF_VALUE: 25
PIF_VALUE: 24
PIF_VALUE: 25
PIF_VALUE: 25
PIF_VALUE: 28
PIF_VALUE: 25
PIF_VALUE: 1
PIF_VALUE: 25
PIF_VALUE: 25
PIF_VALUE: 23
PIF_VALUE: 25
PIF_VALUE: 27
PIF_VALUE: 25
PIF_VALUE: 27
PIF_VALUE: 25
PIF_VALUE: 24
PIF_VALUE: 25
PIF_VALUE: 25
PIF_VALUE: 0
PIF_VALUE: 24
PIF_VALUE: 28
PIF_VALUE: 24
PIF_VALUE: 25
PIF_VALUE: 24
PIF_VALUE: 24
PIF_VALUE: 9
PIF_VALUE: 1
PIF_VALUE: 1
PIF_VALUE: 27
PIF_VALUE: 24
PIF_VALUE: 14
PIF_VALUE: 25
PIF_VALUE: 24
PIF_VALUE: 25
PIF_VALUE: 24
PIF_VALUE: 25
PIF_VALUE: 26
PIF_VALUE: 24
PIF_VALUE: 27
PIF_VALUE: 24
PIF_VALUE: 24
PIF_VALUE: 25
PIF_VALUE: 1
PIF_VALUE: 25
PIF_VALUE: 25
PIF_VALUE: 23
PIF_VALUE: 24
PIF_VALUE: 1
PIF_VALUE: 24
PIF_VALUE: 24
PIF_VALUE: 25
PIF_VALUE: 0
PIF_VALUE: 24
PIF_VALUE: 25
PIF_VALUE: 24
PIF_VALUE: 25
PIF_VALUE: 26
PIF_VALUE: 25
PIF_VALUE: 25
PIF_VALUE: 24
PIF_VALUE: 25
PIF_VALUE: 1
PIF_VALUE: 24
PIF_VALUE: 24
PIF_VALUE: 25
PIF_VALUE: 24
PIF_VALUE: 25
PIF_VALUE: 26
PIF_VALUE: 1
PIF_VALUE: 25
PIF_VALUE: 26
PIF_VALUE: 24
PIF_VALUE: 24
PIF_VALUE: 25
PIF_VALUE: 0
PIF_VALUE: 25
PIF_VALUE: 31
PIF_VALUE: 26
PIF_VALUE: 25
PIF_VALUE: 0
PIF_VALUE: 25
PIF_VALUE: 25
PIF_VALUE: 24
PIF_VALUE: 25
PIF_VALUE: 25
PIF_VALUE: 0
PIF_VALUE: 1
PIF_VALUE: 25
PIF_VALUE: 24

## 2022-02-17 ASSESSMENT — PAIN SCALES - GENERAL: PAINLEVEL_OUTOF10: 0

## 2022-02-17 NOTE — OP NOTE
PROCEDURES PERFORMED:    1. Comprehensive electrophysiologic evaluation including transseptal catheterization, insertion and repositioning of multiple electrode catheters with induction or attempted induction of an arrhythmia including left or right atrial pacing/recording when necessary, right ventricular pacing/recording when necessary, and His bundle recording when necessary with intracardiac catheter ablation of left atrial flutter - roof dependent flutter    2. Intracardiac electrophysiologic three-dimensional mapping    3. Transseptal puncture through an intact septum with measurement of RA and LA pressures. 4. Intracardiac echocardiography. 5. Programmed stimulation and pacing after adenosine injections for assessment of PV isolation    6. Additional ablations :   A) Mitral valve flutter    7. LA pacing and recording was performed using CS catheter      ATTENDING: Melvin Manuel MD.    REFERRING PHYSICIAN:     COMPLICATIONS: None. ESTIMATED BLOOD LOSS: 30 mL. ANESTHESIA: General with intubation    MEDICATIONS USED FOR INDUCTION/TESTING: adenosine    PREOPERATIVE DIAGNOSIS: Persistent Atrial fibrillation    POSTOPERATIVE DIAGNOSIS:  Atrial fibrillation sp Pulmonary vein isolation, Posterior wall isolation with  Posterior floor line and Roof line ablation, Cavotricuspid isthmus ablation. Now in sinus rhythm    INDICATIONS FOR PROCEDURE: Patient with symptomatic persistent atrial fibrillation despite medical therapy here for atrial fibrillation ablation      DETAILS OF PROCEDURE:   The patient was brought to the electrophysiology laboratory in stable condition. The patient was in a fasting, nonsedated state. The risks, benefits and alternatives of the procedure were discussed with the patient.   The risks including, but not limited to, the risks of vascular injury, bleeding, infection, radiation exposure, injury to cardiac and surrounding structures (including esophageal and phrenic nerve injury in addition to pulmonary vein stenosis), injury to the normal conduction system of the heart, stroke, myocardial infarction and death were discussed. Written informed consent was obtained and placed on the chart. A timeout protocol was completed to identify the patient and the procedure being performed. KVNG was performed prior to the ablation procedure to assure the absence of any intracardiac thrombi or other contraindications to proceeding with ablation. The full KVNG report is dictated separately. The patient was prepped and draped in a sterile fashion. Lidocaine was administered to the right and left groin. Using a modified Seldinger technique, venous access was obtained and sheaths were placed as detailed below. Catheters were then placed under fluoroscopic guidance as detailed below. SHEATH AND CATHETER PLACEMENT:  Location  Sheath  Catheter  site    Left femoral vein  7F  Xirrus 6F Decapolar catheter  Coronary sinus    Left femoral vein  11F  Intracardiac Echo with sound technology  Right atrium    Right femoral vein  8F Short and upgraded to 8.5F SRO sheath  D-F curve Smart touch irrigated [employer] Ablation catheter        D curve pentaray catheter Pulmonay veins, Left atrium, RA, RV,     Cavotricuspid isthmus ablation    LA mapping, CFAE idenitication           A small caliber arterial line in the left radial artery was obtained by me as anesthesia service had difficulty with arterial linfe    Intracardiac echocardiography:     A baseline intracardiac echocardiography study was performed. During the procedure, intracardiac echocardiography was used for transseptal puncture, monitoring of catheter placement during radiofrequency lesions, and monitoring for complications.     LA pacing and recording was performed using CS catheter    Three-dimensional electroanatomical mapping:    A three-dimensional electroanatomical mapping: Using the [employer] CARTO 3 three-dimensional electroanatomical map, a shell map of the left atrium and the pulmonary veins was created by dragging the pentaray and ablation catheter in different areas of the left atrium and in the pulmonary veins. The map was used for aiding the navigation process and to reduce fluoroscopy use. It was also used to guide ablation lesions and to justo those lesions. Patient presented in atrial flutters tachycardia cycle length of 270 ms with CS 5 6 early and it was a chevron pattern like arrhythmia and very stable. Tachycardia cycle length was left-sided as it was not right-sided on the mapping so we performed transseptal puncture    Trans-septal puncture: Following initial rhythm determination, which demonstrated that the patient was in atrial arrhythmia an 0.032 inch J-tipped guidewire was advanced through the 8-[place] sheath in the right femoral vein under fluoroscopic guidance. An SRO sheath and dilator were advanced over the guidewire into the superior vena cava under fluoroscopic guidance. The guidewire was removed. A [place] 1 needle was advanced through the dilator until the tip was slightly behind the tip of the dilator. This system was withdrawn until the apparatus was in contact with the foramen ovale. Transseptal access was obtained. Under fluoroscopic, hemodynamic (RA pressure 8mm, LA pressure recording = 13 mm,) and ultrasound guidance, the left atrium was cannulated and sheath advanced. The dilator and needle were removed. Intracardiac echocardiography demonstrated no acute complications. Heparin was given to the patient to keep ACT at around 350sec through out procedure with ACT checks every 15 minutes. On mapping we noted that pulmonary veins were isolated. Posterior wall had gaps in the roof and early meets late was in the same area    Mapping of the atrial flutter:     While performing the mapping of atrial flutter we noted that early meets late was on the roof area and very complex fractionated signals were noted so we decided to ablate on the roof. We entrained the area and it was within the tachycardia cycle length    Ablation of roof dependent flutter    The Smart touch ablation catheter was advanced into position near the right-sided pulmonary veins. The settings for ablation were 35 ortiz with a contact force ranging between  3 gm/sec to 40 gm/ sec was given per lesion with assessment of signal reduction at the site of ablation, impedance drop, Visitag ablation mapping and also FTI upto 450 and Ablation index of upto 450 on posterior wall and upto 550 on anterior wall    On the review decided to perform the ablation with connecting the right superior and the left superior pulmonary veins especially near the early meets area and ablation while performing tachycardia cycle length changed in activation and changed the cycle length to 310 ms    This area was no longer in the early meets area so probably ablation of the roof dependent flutter change to some of the tachycardia so we decided to map another tachycardia    Mapping of atrial arrhythmia CL of 310ms    C3,4 was early and we mapped the tachycardia and tachycardia appeared to be around the mitral valve so we performed pacing from the CS proximal to distal and all were within the cycle length and also we perform mapping in multiple areas and it was confirmed that patient had mitral valve dependent flutter    Given there was lot of complex fractionated signal from the  anterior to the right pulmonary vein so we decided to do a line from the right anterior connecting the RSPV to the mitral valve but we did not terminate the tachycardia so we decided to perform the ablation connecting the left inferior pulmonary vein and the mitral valve annulus    Ablation was performed starting from the mitral annulus as soon as ablation was performed the tachycardia cycle length widened and terminated the tachycardia.   We decided to ablate the entire mitral valve annulus until the block was obtained. We confirmed the bidirectional block          Esophageal temperature was monitored through out the procedure with movement of the esophageal temperature probe. Max temp was 36.9 C    Exit and entrance block from the right and left-sided pulmonary veins was then checked and confirmed. Entrance and exit block was confirmed by the posterior wall to    Following ablation, we confirmed entrance block at each vessel again. Pacing was also performed from each pole of the pentaray catheter at 10 milliamps, 2 milliseconds to assess for exit block. Veins with insufficient exit block were further ablated until exit block was achieved and entrance block confirmed. Adenosine testing    IV adenosine was given per pulmonary veinous side to assess for spontaneous pulmonary vein activity and conduction into atrium if any, Entrance and exit block were confirmed post adenosine on each vein. Pulmonary veins were isolated. Pacing post adenosine IV did not induce any arrhythmia. EP study: measurments in ms    Baseline cycle length : Patient was atrial paced and ventricular paced    AH : 95 on atrial paced  HV : 53    Antegrade Wenckbach cycle length : Patient was ventricular paced even at 1000 ms     AERP: 500/230ms    We did not perform aggressive ventricular pacing as patient has BiV ICD and ablation was intended for atrial arrhythmias. On RV pacing there was no VA conduction at 800 ms      Following confirmation of pulmonary vein isolation, ablation catheter and sheaths were removed from the left atrium. All catheters were now in the right atrium.       We checked the cavotricuspid isthmus to see if previous ablation was done and there was no previous cavotricuspid ablation was done and there was no induced atrial flutter which was typical so we did not perform CTI ablation    Intracardiac echocardiography was used to document the absence of any significant effusion or any other complication. Catheters were removed under fluoroscopic guidance. Sheaths were pulled and manual compression to achieve hemostasis. There was no bleeding noted from any of the access sites. The patient was extubated and was transported to the recovery area in stable condition. SUMMARY:    Ablation of left atrial roof dependent atrial flutter  Ablation of mitral valve annulus dependent flutter      RECOMMENDATIONS:  1. Admit to the floor  2. Call [facility] if the patient becomes hypotensive, febrile, unstable or if there is a change in mental status. 3.  Resume anticoagulation as directed. Needs to get the dose tonight  4. Bed rest x6 hours with legs straight after sheaths are removed. 5. Continue current medications. 6. Follow up in the electrophysiology clinic in three weeks if stable overnight.

## 2022-02-17 NOTE — ANESTHESIA POSTPROCEDURE EVALUATION
Department of Anesthesiology  Postprocedure Note    Patient: Farida Rainey  MRN: [id number]  YOB: 1950  Date of evaluation: 2/17/2022  Time:  11:43 AM     Procedure Summary     Date: 02/17/22 Room / Location: [facility] Lab    Anesthesia Start: 7758 Anesthesia Stop: 0480    Procedure: [facility] EP AFIB ABLATION W ANESTH Diagnosis: Unspecified atrial flutter    Scheduled Providers:  Responsible Provider: Fletcher Grayson DO    Anesthesia Type: general ASA Status: 3          Anesthesia Type: general    May Phase I:      May Phase II:      Last vitals: Reviewed and per EMR flowsheets.        Anesthesia Post Evaluation    Patient location during evaluation: bedside  Patient participation: complete - patient participated  Level of consciousness: awake and alert  Pain score: 0  Airway patency: patent  Nausea & Vomiting: no nausea and no vomiting  Complications: no  Cardiovascular status: hemodynamically stable  Respiratory status: acceptable  Hydration status: euvolemic

## 2022-02-17 NOTE — H&P
Electrophysiology h&P Note      Chief complaint :   Shortness of breath with exertion    Referring physician:  Gennaro Hargrove      Primary care physician: Kain Tejeda MD      History of Present Illness: Today visit (02/17/22)    Patient here for atrial flutter / fibrillation ablation. No change in H&P noted from previous clinic visit. Previous visit (12/8/2021)      Chief Complaint   Patient presents with    Follow-up     Pt does have SOBOE. Pt drinks alcohol occ. Pt doesn't smoke. Pt doesn't exercise. Pt does drink 1 cup coffee daily           Previous visit: (9/8/2021)      Chief Complaint   Patient presents with    Atrial Fibrillation     Pt is here to discuss gen change. Pt denies chest pain, shortness of breath, dizziness, palpitations, and edema. Previous visit:(6/15/2018)      Chief Complaint   Patient presents with    Atrial Fibrillation     Ak-s/p cardioversion and Tikosyn 6/5. Patient denies CP, SOB, palpitations. Does report some dizziness when standing but this is occasional. Patient brought a list of his blood pressure readings. Previous visit: (5/25/2018      Chief Complaint   Patient presents with    Atrial Fibrillation     Ak-Discuss Afib on device. Patient denies CP, palpitations. Denies edema. Does report lightheadedness upon standing at times, but subsides as soon as he gets moving. Patient has an ablation 2014 and cardioversion 2015. Patient does report using CPAP. Patient reports not usually have symptoms of Afib. States he only did the first time. Previous visit:    Chief Complaint   Patient presents with    Follow Up After Procedure     Patient had KVNG and DCCV done 5/18/15. Patient states he does feel any different now when at rest than he did before DCCV. Denies chest pain, dizziness, shortness of breath, palpitations, and edema.       He reports though he is able to do more garden work and is more active though. Past Medical History:   Diagnosis Date    Atrial fibrillation [facility])     Cardiac pacemaker 2/19/2014    BIV ICD Serial # BL [id number] Moderl # [id number]    Cardiomy[facility] [address]     COPD (chronic obstructive pulmonary disease) ([facility] [address]     Sees Dr. Don Amaro    Hematoma - postoperative 02/19/2014    Pseudo aneurism post op at [address] groin    History of blood transfusion 2014    No Reaction To Blood Transfusion Received    History of cardiac cath 12/4/2013 12/13 EF30% mild LAD, CX     History of cardiovascular disorder 12/4/2013    EF 29%, mild ischemia RCA    History of cardioversion 12/6/2013 12/13 unsuccessful    History of cardioversion 5/18/15    DCCV-.      History of echocardiogram 04/01/2019    History of transesophageal echocardiography (KVNG) for monitoring 12/4/2013 12/13 no clots    Hyperlipidemia     Hypertelorism     Hypertension     Obesity     S/P ablation of atrial fibrillation 02/14/2014    for a-fib by dr. Nathen Hernandez S/P cardiac cath 2/5/14    Shortness of breath on exertion     Sleep apnea     Uses CPAP    Teeth missing     Upper And Lower    Wears glasses     [place] teeth extracted     4 [place] Teeth Extracted In Past       Past Surgical History:   Procedure Laterality Date    ATRIAL ABLATION SURGERY  02/21/2014    for a-fib    CARDIAC DEFIBRILLATOR PLACEMENT  02/19/2014    Medtronic Viva S CRT-D Defibrillator Implanted    CARDIAC DEFIBRILLATOR PLACEMENT Left 10/21/2021    BIVICD Generator change - Medtronic Claria MRI CRT-D SureScan    CARDIOVERSION  05/18/2015    KVNG/Cardioversion    COLONOSCOPY  Early 2000's    DENTAL SURGERY      Teeth Extracted In Past    HERNIA REPAIR Right 11/10/2017    inguinal hernia repair with mesh/ robotic    KNEE ARTHROSCOPY Left 1999    TOTAL KNEE ARTHROPLASTY Left 08/2019    WISDOM TOOTH EXTRACTION      4 [place] Teeth Extracted In Past       Family History   Problem Relation Age of Onset    High Blood Pressure Brother     Heart Disease Brother     Cancer Mother         Cancer Unsure What Kind    Early Death Mother 44        Cancer Unsure What Kind    Heart Attack Father     Heart Disease Father         Heart Attack    Diabetes Father         reports that he quit smoking about 48 years ago. His smoking use included cigarettes and cigars. He started smoking about 52 years ago. He has a 4.00 pack-year smoking history. He has never used smokeless tobacco. He reports current alcohol use. He reports that he does not use drugs. No Known Allergies    Current Facility-Administered Medications   Medication Dose Route Frequency Provider Last Rate Last Admin    meperidine (DEMEROL) injection 12.5 mg  12.5 mg IntraVENous Q5 Min PRN Rosita Lavender, DO        HYDROmorphone (DILAUDID) injection 0.5 mg  0.5 mg IntraVENous Q5 Min PRN Rosita Lavender, DO        fentaNYL (SUBLIMAZE) injection 50 mcg  50 mcg IntraVENous Q5 Min PRN Rosita Lavender, DO        metoclopramide (REGLAN) injection 10 mg  10 mg IntraVENous Once PRN Rosita Lavender, DO        diphenhydrAMINE (BENADRYL) injection 12.5 mg  12.5 mg IntraVENous Once PRN Rosita Lavender, DO        labetalol (NORMODYNE;TRANDATE) injection 10 mg  10 mg IntraVENous Q15 Min PRN Rosita Lavender, DO        Or    hydrALAZINE (APRESOLINE) injection 10 mg  10 mg IntraVENous Q15 Min PRN Rosita Lavender, DO           Review of Systems:   Review of Systems   Constitutional: Denies fatigue. Negative for fever, chills and activity change. HENT: Negative for congestion, ear pain and tinnitus. Eyes: Negative for photophobia, pain and visual disturbance. Respiratory: shortness of breath. Negative for cough, chest tightness and wheezing. Cardiovascular: No palpitations. Negative for chest pain and leg swelling. Gastrointestinal: Negative for nausea, vomiting, abdominal pain, diarrhea, constipation and blood in stool.    Endocrine: Negative for cold intolerance and heat intolerance. Genitourinary: Negative for dysuria, hematuria and flank pain. Musculoskeletal: Positive for arthralgias. Negative for myalgias, back pain and neck stiffness. Skin: Negative for color change and rash. Allergic/Immunologic: Negative for food allergies. Neurological:  Negative for  numbness and headaches. Occasional dizziness when he stands up. Hematological: Does not bruise/bleed easily. Psychiatric/Behavioral: Negative for confusion and agitation. Physical Examination:    BP (!) 163/100   Pulse 80   Temp 96 °F (35.6 °C) (Temporal)   Resp 16   Ht 5' 10\" (1.778 m)   Wt 300 lb (136.1 kg)   SpO2 96%   BMI 43.05 kg/m²    Wt Readings from Last 3 Encounters:   02/17/22 300 lb (136.1 kg)   01/18/22 300 lb (136.1 kg)   12/08/21 (!) 305 lb 8 oz (138.6 kg)     Body mass index is 43.05 kg/m². Physical Exam   Constitutional: He is oriented to person, place, and time and well-developed, well-nourished, and in no distress. HENT:   Head: Normocephalic and atraumatic. Eyes: Conjunctivae and EOM are normal. Pupils are equal, round, and reactive to light. Right eye exhibits no discharge. Neck: Normal range of motion. No JVD present. No thyromegaly present. Cardiovascular: IRREGULAR RHYTHM. Exam reveals no friction rub. Murmur heard. Pulmonary/Chest: Effort normal and breath sounds normal. No stridor. No respiratory distress. He has no wheezes. Abdominal: Soft. Bowel sounds are normal. He exhibits no distension. There is no tenderness. Musculoskeletal: Normal range of motion. He exhibits no edema or tenderness. Neurological: He is alert and oriented to person, place, and time. No cranial nerve deficit. Skin: Skin is warm and dry. No rash noted. No erythema.    Psychiatric: Mood and affect normal.           CBC:   Lab Results   Component Value Date    WBC 7.4 01/31/2022    HGB 15.7 01/31/2022    HCT 48.8 01/31/2022     01/31/2022     Lipids: Lab Results   Component Value Date    CHOL 134 02/05/2014    TRIG 104 02/05/2014    HDL 31 (L) 02/05/2014    LDLDIRECT 93 02/05/2014     PT/INR:   Lab Results   Component Value Date    INR 1.08 01/31/2022        BMP:    Lab Results   Component Value Date     01/31/2022    K 4.2 01/31/2022     01/31/2022    CO2 27 01/31/2022    BUN 18 01/31/2022     CMP:   Lab Results   Component Value Date    AST 25 04/02/2018    PROT 6.7 04/02/2018    BILITOT 0.6 04/02/2018    ALKPHOS 56 04/02/2018     TSH:    No results found for: TSH          IMPRESSION / RECOMMENDATIONS:     Atrial flutter persistent  Monitoring for tikosyn therapy  Persistent atrial fibrillation sp cardioversion and now in sinus BIV paced (previous ablation performed in outside facility )([facility])  CAD  Severe left ventricular systolic dysfunction SP BIV ICD  HTN  HLD      Patient is in atrial flutter with and reports that he is symptomatic with shortness of breath with exertion. Patient reports that he had a previous ablation at [facility] and had complications with prolonged stay in the hospital and he was worried about it but he still wants to consider ablation now because he is not feeling the same when he is in arrhythmia. Patient is already on [address] [place] [address] with the procedure explained in detail and patient agrees with the plan    The risks including, but not limited to, vascular injury, bleeding, infection, radiation exposure, injury to cardiac and surrounding structures (including esophageal and pulmonary vein injury), injury to the normal conduction system of the heart (possibly requiring a pacemaker), stroke, myocardial infarction and death were all discussed. The patient considered the risks, benefits and alternatives; decided to proceed with the procedure. Also discussed about the possible Covid exposure given the pandemic situation and patient also voiced concerns regarding that.   With precautions we have been doing the procedures at the

## 2022-02-17 NOTE — PROGRESS NOTES
1142 - transferred from EP lab on cart, monitor applied, alarms on and verified, austin leveleld and zeroed, bedside handoff provided [facility] MARINA and Jose CRNA  8907 - left femoral sheaths pulled, manual pressure applied  1155 - EKG done bedside  1220 - right femoral sheath pulled, manual pressure applied  1232 - Echocardiogram done bedside  1240 - austin discontinued from left radial artery, catheter intact, pressure applied for 10 minutes, drsng applied  1309 - phase one care complete awaiting room assignment to transfer patient to inpatient  \

## 2022-02-17 NOTE — PROCEDURES
Danny Devlin   73 y.o., male  1950 2/17/2022    Attending: Eddie Bear MD    Sedation : Anesthesia                        Indication:     Pre-atrial fibrillation/flutter ablation                                                                                                                                                     After obtaining the informed cosent. Pt brought to EP lab. Sedation per anesthesia . After proper sedation KVNG performed. US Doppler was used to assess abnormalities where appropriate. Post procedure pt is stable. Findings:  LV=  lvef is low normal  LA=  dilated  MALLORIE= NO CLOTS  RV= normal  RA=  normal  IAS= Normal  Bubble study=not done for PFO or ASD  AV= tricuspid, no significant regurgitation and no significant stenosis  MV=moderate regurgitation, no stenosis  TV=mild regurgitation  PV= no significant regurgitation,   Aorta=mild stable plaque noted  PUL CATHERINE FLOW=systolic blunting noted.  In atrial arrhythmia    Impression:  No MALLORIE clot    Plan:  Proceed with ablation

## 2022-02-18 VITALS
OXYGEN SATURATION: 95 % | BODY MASS INDEX: 42.95 KG/M2 | RESPIRATION RATE: 18 BRPM | SYSTOLIC BLOOD PRESSURE: 159 MMHG | WEIGHT: 300 LBS | DIASTOLIC BLOOD PRESSURE: 76 MMHG | HEART RATE: 87 BPM | HEIGHT: 70 IN | TEMPERATURE: 98 F

## 2022-02-18 LAB
ANION GAP SERPL CALCULATED.3IONS-SCNC: 9 MMOL/L (ref 4–16)
BUN BLDV-MCNC: 18 MG/DL (ref 6–23)
CALCIUM SERPL-MCNC: 8.3 MG/DL (ref 8.3–10.6)
CHLORIDE BLD-SCNC: 105 MMOL/L (ref 99–110)
CO2: 25 MMOL/L (ref 21–32)
CREAT SERPL-MCNC: 0.9 MG/DL (ref 0.9–1.3)
EKG ATRIAL RATE: 92 BPM
EKG DIAGNOSIS: NORMAL
EKG P AXIS: 62 DEGREES
EKG P-R INTERVAL: 134 MS
EKG Q-T INTERVAL: 418 MS
EKG QRS DURATION: 148 MS
EKG QTC CALCULATION (BAZETT): 516 MS
EKG R AXIS: 163 DEGREES
EKG T AXIS: 62 DEGREES
EKG VENTRICULAR RATE: 92 BPM
GFR AFRICAN AMERICAN: >60 ML/MIN/1.73M2
GFR NON-AFRICAN AMERICAN: >60 ML/MIN/1.73M2
GLUCOSE BLD-MCNC: 146 MG/DL (ref 70–99)
MAGNESIUM: 2 MG/DL (ref 1.8–2.4)
POTASSIUM SERPL-SCNC: 3.7 MMOL/L (ref 3.5–5.1)
SODIUM BLD-SCNC: 139 MMOL/L (ref 135–145)

## 2022-02-18 PROCEDURE — 83735 ASSAY OF MAGNESIUM: CPT

## 2022-02-18 PROCEDURE — 93010 ELECTROCARDIOGRAM REPORT: CPT | Performed by: INTERNAL MEDICINE

## 2022-02-18 PROCEDURE — 80048 BASIC METABOLIC PNL TOTAL CA: CPT

## 2022-02-18 PROCEDURE — 6370000000 HC RX 637 (ALT 250 FOR IP): Performed by: NURSE PRACTITIONER

## 2022-02-18 PROCEDURE — 2580000003 HC RX 258: Performed by: NURSE PRACTITIONER

## 2022-02-18 PROCEDURE — 99217 PR OBSERVATION CARE DISCHARGE MANAGEMENT: CPT | Performed by: NURSE PRACTITIONER

## 2022-02-18 RX ORDER — PANTOPRAZOLE SODIUM 40 MG/1
40 TABLET, DELAYED RELEASE ORAL
Qty: 30 TABLET | Refills: 0 | Status: SHIPPED | OUTPATIENT
Start: 2022-02-18 | End: 2022-03-24 | Stop reason: ALTCHOICE

## 2022-02-18 RX ADMIN — APIXABAN 5 MG: 5 TABLET, FILM COATED ORAL at 08:03

## 2022-02-18 RX ADMIN — SODIUM CHLORIDE, PRESERVATIVE FREE 10 ML: 5 INJECTION INTRAVENOUS at 08:04

## 2022-02-18 RX ADMIN — CARVEDILOL 25 MG: 25 TABLET, FILM COATED ORAL at 08:04

## 2022-02-18 RX ADMIN — PANTOPRAZOLE SODIUM 40 MG: 40 TABLET, DELAYED RELEASE ORAL at 08:03

## 2022-02-18 RX ADMIN — HYDROCHLOROTHIAZIDE 25 MG: 25 TABLET ORAL at 08:03

## 2022-02-18 RX ADMIN — DOFETILIDE 250 MCG: 0.25 CAPSULE ORAL at 08:04

## 2022-02-18 ASSESSMENT — PAIN SCALES - GENERAL: PAINLEVEL_OUTOF10: 0

## 2022-02-18 NOTE — PROGRESS NOTES
1930 Pt assessment complete. Bilateral groin puncture site soft and no hematoma. Bilateral groin dressings dry and intact. Pt denies needs. Call light in reach. 1940 Pt up to restroom. Pt tolerated well and voiding without difficulty. Pt back to room and denies needs. Monitors on. Call light in reach. 2030 In to check on pt. Pt denies c/o or needs. Call light in reach. 2050 Pt up to walk in robert. Pt tolerated well and to sit in chair. 2150 Pt denies c/o or needs. 2300 Pt set his CPAP up. Pt back to bed. Monitors on. Pt denies needs. Bilateral groin puncture site soft and no hematoma. Bilateral groin dressings dry and intact. Call light in reach. 2350 Pt called out and wanted another pillow. Call light in reach. Pt wants BP off.  0050 In to check on pt. Pt resting on right side. Respirations even and unlabored. CAll light in reach. 0150 in to check on pt. Pt resting on right side. Respirations even and unlabored. CAll light in reach. CPAP on.  0250 In to check on pt. Pt lying on left side. Respirations even and unlabored with CPAP on. Call light in reach. 0350 In to check on pt. Pt lying on back. Respirations even and unlabored. CPAP on. Call light in reach. 7723 Lab drawn. Pt denies c/o or needs. Call light in reach. Bilateral groin puncture site soft and no hematoma. Bilateral groin dressings dry and intact. 0544 Pt up to restroom. 0550 Pt back to room. Sitting on side of bed.

## 2022-02-18 NOTE — DISCHARGE SUMMARY
[facility]  Discharge Summary    Faizan Jung  :  1950  MRN:  [id number]    Admit date:  2022  Discharge date:      Admitting Physician:  Radha Soto MD    Discharge Diagnoses:      1. S/p Ablation of left atrial roof dependent atrial flutter  2. S/p ablation of mitral valve annulus dependent flutter     Admission Condition:  fair    Discharged Condition:  good    Hospital Course:   Patient admitted post ablation of atrial flutter for observation. Patient tolerated the procedure and post procedure stay was uneventful. Patient was stable for discharge to be followed as an out paitent. Patient has a history of pseudoaneurysm. Bilateral femoral groin sites are soft, nontender. No hematoma. Patient to follow up in 1 week. Current Discharge Medication List           Details   pantoprazole (PROTONIX) 40 MG tablet Take 1 tablet by mouth every morning (before breakfast)  Qty: 30 tablet, Refills: 0              Details   Cholecalciferol (VITAMIN D3 PO) Take by mouth      hydroCHLOROthiazide (HYDRODIURIL) 25 MG tablet Take 25 mg by mouth daily      allopurinol (ZYLOPRIM) 300 MG tablet Take 300 mg by mouth daily      Multiple Vitamins-Minerals (MULTIVITAMIN PO) Take by mouth every morning Over The Counter      CPAP Machine MISC nightly       dofetilide (TIKOSYN) 250 MCG capsule TAKE 1 CAPSULE BY MOUTH EVERY 12 HOURS  Qty: 60 capsule, Refills: 3    Comments: This prescription was filled on 2021. Any refills authorized will be placed on file. apixaban (ELIQUIS) 5 MG TABS tablet Take 1 tablet by mouth 2 times daily  Qty: 60 tablet, Refills: 5      lisinopril (PRINIVIL;ZESTRIL) 20 MG tablet TAKE 1 TABLET BY MOUTH TWICE DAILY  Qty: 180 tablet, Refills: 1      carvedilol (COREG) 25 MG tablet Take 1 tablet by mouth 2 times daily (with meals).   Qty: 60 tablet, Refills: 5             Discharge Exam:  BP (!) 159/76   Pulse 87   Temp 98 °F (36.7 °C)   Resp 18   Ht 5' 10\" (1.778 m)   Wt 300 lb (136.1 kg)   SpO2 95%   BMI 43.05 kg/m²   General appearance: alert, appears stated age and cooperative  Head: Normocephalic, without obvious abnormality, atraumatic  Lungs: clear to auscultation bilaterally  Heart: regular rate and rhythm, S1, S2 normal, grade 2/6 systolic murmur, No click, rub or gallop  Extremities: extremities normal, atraumatic, no cyanosis or edema  Pulses: 2+ and symmetric  Skin: Skin color, texture, turgor normal. No rashes or lesions. Bilateral femoral groin sites soft, nontender no hematoma.    Neurologic: Grossly normal    Disposition:   home    Signed:  JETT Sanchez CNP  2/18/2022, 8:30 AM

## 2022-02-18 NOTE — PROGRESS NOTES
Outpatient Pharmacy Progress Note for Meds-to-Beds    Total number of Prescriptions Filled: 1  The following medications were dispensed to the patient during the discharge process:  Pantoprazole 40 mg    Additional Documentation:  Medication(s) were delivered to the patient's room prior to discharge      Thank you for letting us serve your patients.   [address]    [address], [address] [facility]    Phone: [phone]    Fax: [phone]

## 2022-02-24 ENCOUNTER — OFFICE VISIT (OUTPATIENT)
Dept: CARDIOLOGY CLINIC | Age: 72
End: 2022-02-24
Payer: MEDICARE

## 2022-02-24 VITALS
SYSTOLIC BLOOD PRESSURE: 130 MMHG | BODY MASS INDEX: 44.69 KG/M2 | WEIGHT: 312.2 LBS | DIASTOLIC BLOOD PRESSURE: 88 MMHG | HEART RATE: 74 BPM | HEIGHT: 70 IN

## 2022-02-24 DIAGNOSIS — Z51.81 VISIT FOR MONITORING TIKOSYN THERAPY: ICD-10-CM

## 2022-02-24 DIAGNOSIS — Z98.890 S/P ABLATION OF ATRIAL FLUTTER: Primary | ICD-10-CM

## 2022-02-24 DIAGNOSIS — I10 HTN (HYPERTENSION), BENIGN: ICD-10-CM

## 2022-02-24 DIAGNOSIS — Z86.79 S/P ABLATION OF ATRIAL FLUTTER: Primary | ICD-10-CM

## 2022-02-24 DIAGNOSIS — Z79.899 VISIT FOR MONITORING TIKOSYN THERAPY: ICD-10-CM

## 2022-02-24 DIAGNOSIS — Z95.810 ICD (IMPLANTABLE CARDIOVERTER-DEFIBRILLATOR), BIVENTRICULAR, IN SITU: ICD-10-CM

## 2022-02-24 PROCEDURE — 3017F COLORECTAL CA SCREEN DOC REV: CPT | Performed by: NURSE PRACTITIONER

## 2022-02-24 PROCEDURE — 99214 OFFICE O/P EST MOD 30 MIN: CPT | Performed by: NURSE PRACTITIONER

## 2022-02-24 PROCEDURE — 1036F TOBACCO NON-USER: CPT | Performed by: NURSE PRACTITIONER

## 2022-02-24 PROCEDURE — G8427 DOCREV CUR MEDS BY ELIG CLIN: HCPCS | Performed by: NURSE PRACTITIONER

## 2022-02-24 PROCEDURE — 93289 INTERROG DEVICE EVAL HEART: CPT | Performed by: NURSE PRACTITIONER

## 2022-02-24 PROCEDURE — 1123F ACP DISCUSS/DSCN MKR DOCD: CPT | Performed by: NURSE PRACTITIONER

## 2022-02-24 PROCEDURE — G8484 FLU IMMUNIZE NO ADMIN: HCPCS | Performed by: NURSE PRACTITIONER

## 2022-02-24 PROCEDURE — G8417 CALC BMI ABV UP PARAM F/U: HCPCS | Performed by: NURSE PRACTITIONER

## 2022-02-24 PROCEDURE — 4040F PNEUMOC VAC/ADMIN/RCVD: CPT | Performed by: NURSE PRACTITIONER

## 2022-02-24 NOTE — PATIENT INSTRUCTIONS
Please be informed that if you contact our office outside of normal business hours the physician on call cannot help with any scheduling or rescheduling issues, procedure instruction questions or any type of medication issue. We advise you for any urgent/emergency that you go to the nearest emergency room! PLEASE CALL OUR OFFICE DURING NORMAL BUSINESS HOURS    Monday - Friday   8 am to 5 pm    [place]: [place]: [phone]    [place]:  [phone]    **It is YOUR responsibilty to bring medication bottles and/or updated medication list to [address] [place] [address].  This will allow us to better serve you and all your healthcare needs**

## 2022-02-24 NOTE — PROGRESS NOTES
Electrophysiology Follow up  Note      Chief complaint: follow up on ablation     Referring physician:  [facility]      Primary care physician: Bianca Prieto MD      History of Present Illness: This visit 2/24/2022  Patient here today for 1 week follow-up status post ablation of atrial flutter. Patient reports he has been feeling well. He denies chest pain, palpitations, shortness of breath, lightheadedness, dizziness, edema or syncope    Previous visit (12/8/2021)      Chief Complaint   Patient presents with    Follow-up     Pt does have SOBOE. Pt drinks alcohol occ. Pt doesn't smoke. Pt doesn't exercise. Pt does drink 1 cup coffee daily           Previous visit: (9/8/2021)      Chief Complaint   Patient presents with    Atrial Fibrillation     Pt is here to discuss gen change. Pt denies chest pain, shortness of breath, dizziness, palpitations, and edema. Previous visit:(6/15/2018)      Chief Complaint   Patient presents with    Atrial Fibrillation     Ak-s/p cardioversion and Tikosyn 6/5. Patient denies CP, SOB, palpitations. Does report some dizziness when standing but this is occasional. Patient brought a list of his blood pressure readings. Previous visit: (5/25/2018      Chief Complaint   Patient presents with    Atrial Fibrillation     Ak-Discuss Afib on device. Patient denies CP, palpitations. Denies edema. Does report lightheadedness upon standing at times, but subsides as soon as he gets moving. Patient has an ablation 2014 and cardioversion 2015. Patient does report using CPAP. Patient reports not usually have symptoms of Afib. States he only did the first time. Previous visit:    Chief Complaint   Patient presents with    Follow Up After Procedure     Patient had KVNG and DCCV done 5/18/15. Patient states he does feel any different now when at rest than he did before DCCV.  Denies chest pain, dizziness, shortness of breath, palpitations, and edema. He reports though he is able to do more garden work and is more active though. Past Medical History:   Diagnosis Date    Atrial fibrillation [facility])     Cardiac pacemaker 2/19/2014    BIV ICD Serial # BL [id number] Moderl # [id number]    [facility]omy[facility] [address]     COPD (chronic obstructive pulmonary disease) ([facility] [address]     Sees Dr. Miguel Turner    Hematoma - postoperative 02/19/2014    Pseudo aneurism post op at [address] groin    History of blood transfusion 2014    No Reaction To Blood Transfusion Received    History of cardiac cath 12/4/2013 12/13 EF30% mild LAD, CX     History of cardiovascular disorder 12/4/2013    EF 29%, mild ischemia RCA    History of cardioversion 12/6/2013 12/13 unsuccessful    History of cardioversion 5/18/15    DCCV-.      History of echocardiogram 04/01/2019    History of transesophageal echocardiography (KVNG) for monitoring 12/4/2013 12/13 no clots    Hyperlipidemia     Hypertelorism     Hypertension     Obesity     S/P ablation of atrial fibrillation 02/14/2014    for a-fib by dr. Lola Cordoba S/P cardiac cath 2/5/14    Shortness of breath on exertion     Sleep apnea     Uses CPAP    Teeth missing     Upper And Lower    Wears glasses     [place] teeth extracted     4 [place] Teeth Extracted In Past       Past Surgical History:   Procedure Laterality Date    ABLATION OF DYSRHYTHMIC FOCUS  02/17/2022    atrial flutter ablation    ATRIAL ABLATION SURGERY  02/21/2014    for a-fib    CARDIAC DEFIBRILLATOR PLACEMENT  02/19/2014    Medtronic Viva S CRT-D Defibrillator Implanted    CARDIAC DEFIBRILLATOR PLACEMENT Left 10/21/2021    BIVICD Generator change - Medtronic Claria MRI CRT-D SureScan    CARDIOVERSION  05/18/2015    KVNG/Cardioversion    COLONOSCOPY  Early 2000's    DENTAL SURGERY      Teeth Extracted In Past    HERNIA REPAIR Right 11/10/2017    inguinal hernia repair with mesh/ robotic    KNEE ARTHROSCOPY Left 1999    TOTAL KNEE ARTHROPLASTY Left 08/2019    WISDOM TOOTH EXTRACTION      4 [place] Teeth Extracted In Past       Family History   Problem Relation Age of Onset    High Blood Pressure Brother     Heart Disease Brother     Cancer Mother         Cancer Unsure What Kind    Early Death Mother 44        Cancer Unsure What Kind    Heart Attack Father     Heart Disease Father         Heart Attack    Diabetes Father         reports that he quit smoking about 48 years ago. His smoking use included cigarettes and cigars. He started smoking about 52 years ago. He has a 4.00 pack-year smoking history. He has never used smokeless tobacco. He reports current alcohol use. He reports that he does not use drugs. No Known Allergies    Current Outpatient Medications   Medication Sig Dispense Refill    pantoprazole (PROTONIX) 40 MG tablet Take 1 tablet by mouth every morning (before breakfast) 30 tablet 0    dofetilide (TIKOSYN) 250 MCG capsule TAKE 1 CAPSULE BY MOUTH EVERY 12 HOURS 60 capsule 3    apixaban (ELIQUIS) 5 MG TABS tablet Take 1 tablet by mouth 2 times daily 60 tablet 5    Cholecalciferol (VITAMIN D3 PO) Take by mouth      hydroCHLOROthiazide (HYDRODIURIL) 25 MG tablet Take 25 mg by mouth daily      lisinopril (PRINIVIL;ZESTRIL) 20 MG tablet TAKE 1 TABLET BY MOUTH TWICE DAILY 180 tablet 1    allopurinol (ZYLOPRIM) 300 MG tablet Take 300 mg by mouth daily      Multiple Vitamins-Minerals (MULTIVITAMIN PO) Take by mouth every morning Over The Counter      CPAP Machine MISC nightly       carvedilol (COREG) 25 MG tablet Take 1 tablet by mouth 2 times daily (with meals). 60 tablet 5     No current facility-administered medications for this visit. Review of Systems:   Review of Systems   Constitutional: Denies fatigue. Negative for fever, chills and activity change. HENT: Negative for congestion, ear pain and tinnitus. Eyes: Negative for photophobia, pain and visual disturbance.    Respiratory: Negative shortness of breath. Negative for cough, chest tightness and wheezing. Cardiovascular: No palpitations. Negative for chest pain and leg swelling. Gastrointestinal: Negative for nausea, vomiting, abdominal pain, diarrhea, constipation and blood in stool. Endocrine: Negative for cold intolerance and heat intolerance. Genitourinary: Negative for dysuria, hematuria and flank pain. Musculoskeletal: Positive for arthralgias. Negative for myalgias, back pain and neck stiffness. Skin: Negative for color change and rash. Allergic/Immunologic: Negative for food allergies. Neurological:  Negative for  numbness and headaches. Occasional dizziness when he stands up. Hematological: Does not bruise/bleed easily. Psychiatric/Behavioral: Negative for confusion and agitation. Physical Examination:    /88   Pulse 74   Ht 5' 10\" (1.778 m)   Wt (!) 312 lb 3.2 oz (141.6 kg)   BMI 44.80 kg/m²    Wt Readings from Last 3 Encounters:   02/24/22 (!) 312 lb 3.2 oz (141.6 kg)   02/17/22 300 lb (136.1 kg)   01/18/22 300 lb (136.1 kg)     Body mass index is 44.8 kg/m². Physical Exam   Constitutional: He is oriented to person, place, and time and well-developed, well-nourished, and in no distress. HENT:   Head: Normocephalic and atraumatic. Eyes: Conjunctivae  are normal. Pupils are equal, round, and reactive to light. Right and left eye exhibits no discharge. Neck: Normal range of motion. No JVD present. No thyromegaly present. Cardiovascular: Regular rate and RHYTHM. Exam reveals no friction rub. Murmur heard. Pulmonary/Chest: Effort normal and breath sounds normal. No stridor. No respiratory distress. He has no wheezes. Abdominal: Soft. Bowel sounds are normal. He exhibits no distension. There is no tenderness. Musculoskeletal: Normal range of motion. He exhibits no edema or tenderness. Neurological: He is alert and oriented to person, place, and time.  No cranial nerve deficit. Skin: Skin is warm and dry. No rash noted. No erythema. Psychiatric: Mood and affect normal.           CBC:   Lab Results   Component Value Date    WBC 7.4 01/31/2022    HGB 14.9 02/17/2022    HCT 44.0 02/17/2022     01/31/2022     Lipids:   Lab Results   Component Value Date    CHOL 134 02/05/2014    TRIG 104 02/05/2014    HDL 31 (L) 02/05/2014    LDLDIRECT 93 02/05/2014     PT/INR:   Lab Results   Component Value Date    INR 1.08 01/31/2022        BMP:    Lab Results   Component Value Date     02/18/2022    K 3.7 02/18/2022     02/18/2022    CO2 25 02/18/2022    BUN 18 02/18/2022     CMP:   Lab Results   Component Value Date    AST 25 04/02/2018    PROT 6.7 04/02/2018    BILITOT 0.6 04/02/2018    ALKPHOS 56 04/02/2018     TSH:    No results found for: TSH          IMPRESSION / RECOMMENDATIONS:     1. Status post ablation of left atrial roof-dependent atrial flutter  2. Status post ablation of mitral valve annulus dependent flutter  3. Monitoring for tikosyn therapy  4. Persistent atrial fibrillation sp cardioversion and now in sinus BIV paced (previous ablation performed in outside facility )([facility])  5. CAD  6. Severe left ventricular systolic dysfunction SP 7. BIV ICD  8. HTN  9. HLD    EKG obtained and reviewed patient noted to be ventricular paced  BiV ICD interrogated  No atrial or ventricular rhythm noted. Qtc is 454- this is within range to continue Tikosyn 250 mcg BID  Patient denies issues with bleeding  We will continue eliquis 5 mg BID    BP is stable  Vitals:    02/24/22 1002   BP: 130/88   Pulse: 74   continue coreg 25 mg BID  Continue lisinopril 20 mg daily    Device Assessment:     The device is [employer] BIVICD . Device interrogation was performed. Mode : DDD     Sensing is normal. Impedence is normal.  Threshold is normal.     There has not been interval changes.       Estimated battery life is 8.0 years    Atrial Arrhythmia : No    Non sustained VT episodes :

## 2022-03-01 ENCOUNTER — PROCEDURE VISIT (OUTPATIENT)
Dept: CARDIOLOGY CLINIC | Age: 72
End: 2022-03-01
Payer: MEDICARE

## 2022-03-01 DIAGNOSIS — Z95.810 ICD (IMPLANTABLE CARDIOVERTER-DEFIBRILLATOR), BIVENTRICULAR, IN SITU: Primary | ICD-10-CM

## 2022-03-16 PROCEDURE — 93295 DEV INTERROG REMOTE 1/2/MLT: CPT | Performed by: NURSE PRACTITIONER

## 2022-03-16 PROCEDURE — 93297 REM INTERROG DEV EVAL ICPMS: CPT | Performed by: NURSE PRACTITIONER

## 2022-03-16 PROCEDURE — 93296 REM INTERROG EVL PM/IDS: CPT | Performed by: NURSE PRACTITIONER

## 2022-03-24 ENCOUNTER — OFFICE VISIT (OUTPATIENT)
Dept: CARDIOLOGY CLINIC | Age: 72
End: 2022-03-24
Payer: MEDICARE

## 2022-03-24 VITALS
HEIGHT: 70 IN | WEIGHT: 315 LBS | BODY MASS INDEX: 45.1 KG/M2 | SYSTOLIC BLOOD PRESSURE: 132 MMHG | HEART RATE: 74 BPM | DIASTOLIC BLOOD PRESSURE: 84 MMHG

## 2022-03-24 DIAGNOSIS — Z86.79 S/P ABLATION OF ATRIAL FLUTTER: Primary | ICD-10-CM

## 2022-03-24 DIAGNOSIS — Z86.79 S/P ABLATION OF ATRIAL FIBRILLATION: ICD-10-CM

## 2022-03-24 DIAGNOSIS — Z79.899 VISIT FOR MONITORING TIKOSYN THERAPY: ICD-10-CM

## 2022-03-24 DIAGNOSIS — Z98.890 S/P ABLATION OF ATRIAL FIBRILLATION: ICD-10-CM

## 2022-03-24 DIAGNOSIS — Z98.890 S/P ABLATION OF ATRIAL FLUTTER: Primary | ICD-10-CM

## 2022-03-24 DIAGNOSIS — Z51.81 VISIT FOR MONITORING TIKOSYN THERAPY: ICD-10-CM

## 2022-03-24 DIAGNOSIS — Z95.810 ICD (IMPLANTABLE CARDIOVERTER-DEFIBRILLATOR), BIVENTRICULAR, IN SITU: ICD-10-CM

## 2022-03-24 DIAGNOSIS — I10 HTN (HYPERTENSION), BENIGN: ICD-10-CM

## 2022-03-24 PROCEDURE — 3017F COLORECTAL CA SCREEN DOC REV: CPT | Performed by: NURSE PRACTITIONER

## 2022-03-24 PROCEDURE — G8417 CALC BMI ABV UP PARAM F/U: HCPCS | Performed by: NURSE PRACTITIONER

## 2022-03-24 PROCEDURE — 4040F PNEUMOC VAC/ADMIN/RCVD: CPT | Performed by: NURSE PRACTITIONER

## 2022-03-24 PROCEDURE — 99214 OFFICE O/P EST MOD 30 MIN: CPT | Performed by: NURSE PRACTITIONER

## 2022-03-24 PROCEDURE — 1123F ACP DISCUSS/DSCN MKR DOCD: CPT | Performed by: NURSE PRACTITIONER

## 2022-03-24 PROCEDURE — G8427 DOCREV CUR MEDS BY ELIG CLIN: HCPCS | Performed by: NURSE PRACTITIONER

## 2022-03-24 PROCEDURE — 1036F TOBACCO NON-USER: CPT | Performed by: NURSE PRACTITIONER

## 2022-03-24 PROCEDURE — 93289 INTERROG DEVICE EVAL HEART: CPT | Performed by: NURSE PRACTITIONER

## 2022-03-24 PROCEDURE — G8484 FLU IMMUNIZE NO ADMIN: HCPCS | Performed by: NURSE PRACTITIONER

## 2022-03-24 NOTE — PROGRESS NOTES
Electrophysiology h&P Note      Chief complaint :   Shortness of breath with exertion    Referring physician:  Tushar Shook      Primary care physician: Antwon Rodriguez MD      History of Present Illness: This visit 3/24/2022  Patient is here today for follow-up on ablation of atrial flutter. Patient reports he has been feeling well. He denies chest pain, lightheadedness, dizziness, edema or syncope. He states he does have some shortness of breath with exertion but feels this is due to his weight. He is trying to lose weight. Previous visit (12/8/2021)      Chief Complaint   Patient presents with    Follow-up     Pt does have SOBOE. Pt drinks alcohol occ. Pt doesn't smoke. Pt doesn't exercise. Pt does drink 1 cup coffee daily           Previous visit: (9/8/2021)      Chief Complaint   Patient presents with    Atrial Fibrillation     Pt is here to discuss gen change. Pt denies chest pain, shortness of breath, dizziness, palpitations, and edema. Previous visit:(6/15/2018)      Chief Complaint   Patient presents with    Atrial Fibrillation     Ak-s/p cardioversion and Tikosyn 6/5. Patient denies CP, SOB, palpitations. Does report some dizziness when standing but this is occasional. Patient brought a list of his blood pressure readings. Previous visit: (5/25/2018      Chief Complaint   Patient presents with    Atrial Fibrillation     Ak-Discuss Afib on device. Patient denies CP, palpitations. Denies edema. Does report lightheadedness upon standing at times, but subsides as soon as he gets moving. Patient has an ablation 2014 and cardioversion 2015. Patient does report using CPAP. Patient reports not usually have symptoms of Afib. States he only did the first time. Previous visit:    Chief Complaint   Patient presents with    Follow Up After Procedure     Patient had KVNG and DCCV done 5/18/15.  Patient states he does feel any different now when at rest than he did before DCCV. Denies chest pain, dizziness, shortness of breath, palpitations, and edema. He reports though he is able to do more garden work and is more active though. Past Medical History:   Diagnosis Date    Atrial fibrillation [facility])     Cardiac pacemaker 2/19/2014    BIV ICD Serial # BL [id number] Moderl # [id number]    Cardiomy[facility] [address]     COPD (chronic obstructive pulmonary disease) ([facility] [address]     Sees Dr. Gurvinder Oro    Hematoma - postoperative 02/19/2014    Pseudo aneurism post op at [address] groin    History of blood transfusion 2014    No Reaction To Blood Transfusion Received    History of cardiac cath 12/4/2013 12/13 EF30% mild LAD, CX     History of cardiovascular disorder 12/4/2013    EF 29%, mild ischemia RCA    History of cardioversion 12/6/2013 12/13 unsuccessful    History of cardioversion 5/18/15    DCCV-.      History of echocardiogram 04/01/2019    History of transesophageal echocardiography (KVNG) for monitoring 12/4/2013 12/13 no clots    Hyperlipidemia     Hypertelorism     Hypertension     Obesity     S/P ablation of atrial fibrillation 02/14/2014    for a-fib by dr. Ron Bond S/P cardiac cath 2/5/14    Shortness of breath on exertion     Sleep apnea     Uses CPAP    Teeth missing     Upper And Lower    Wears glasses     [place] teeth extracted     4 [place] Teeth Extracted In Past       Past Surgical History:   Procedure Laterality Date    ABLATION OF DYSRHYTHMIC FOCUS  02/17/2022    atrial flutter ablation    ATRIAL ABLATION SURGERY  02/21/2014    for a-fib    CARDIAC DEFIBRILLATOR PLACEMENT  02/19/2014    Medtronic Viva S CRT-D Defibrillator Implanted    CARDIAC DEFIBRILLATOR PLACEMENT Left 10/21/2021    BIVICD Generator change - Medtronic Claria MRI CRT-D SureScan    CARDIOVERSION  05/18/2015    KVNG/Cardioversion    COLONOSCOPY  Early 2000's    DENTAL SURGERY      Teeth Extracted In Past    HERNIA REPAIR Right 11/10/2017    inguinal hernia repair with mesh/ robotic    KNEE ARTHROSCOPY Left 1999    TOTAL KNEE ARTHROPLASTY Left 08/2019    WISDOM TOOTH EXTRACTION      4 [place] Teeth Extracted In Past       Family History   Problem Relation Age of Onset    High Blood Pressure Brother     Heart Disease Brother     Cancer Mother         Cancer Unsure What Kind    Early Death Mother 44        Cancer Unsure What Kind    Heart Attack Father     Heart Disease Father         Heart Attack    Diabetes Father         reports that he quit smoking about 48 years ago. His smoking use included cigarettes and cigars. He started smoking about 52 years ago. He has a 4.00 pack-year smoking history. He has never used smokeless tobacco. He reports current alcohol use. He reports that he does not use drugs. No Known Allergies    Current Outpatient Medications   Medication Sig Dispense Refill    pantoprazole (PROTONIX) 40 MG tablet Take 1 tablet by mouth every morning (before breakfast) 30 tablet 0    dofetilide (TIKOSYN) 250 MCG capsule TAKE 1 CAPSULE BY MOUTH EVERY 12 HOURS 60 capsule 3    apixaban (ELIQUIS) 5 MG TABS tablet Take 1 tablet by mouth 2 times daily 60 tablet 5    Cholecalciferol (VITAMIN D3 PO) Take by mouth      hydroCHLOROthiazide (HYDRODIURIL) 25 MG tablet Take 25 mg by mouth daily      lisinopril (PRINIVIL;ZESTRIL) 20 MG tablet TAKE 1 TABLET BY MOUTH TWICE DAILY 180 tablet 1    allopurinol (ZYLOPRIM) 300 MG tablet Take 300 mg by mouth daily      Multiple Vitamins-Minerals (MULTIVITAMIN PO) Take by mouth every morning Over The Counter      CPAP Machine MISC nightly       carvedilol (COREG) 25 MG tablet Take 1 tablet by mouth 2 times daily (with meals). 60 tablet 5     No current facility-administered medications for this visit. Review of Systems:   Review of Systems   Constitutional: Denies fatigue. Negative for fever, chills and activity change.    HENT: Negative for congestion, ear pain and tinnitus. Eyes: Negative for photophobia, pain and visual disturbance. Respiratory: shortness of breath. Negative for cough, chest tightness and wheezing. Cardiovascular: No palpitations. Negative for chest pain and leg swelling. Gastrointestinal: Negative for nausea, vomiting, abdominal pain, diarrhea, constipation and blood in stool. Endocrine: Negative for cold intolerance and heat intolerance. Genitourinary: Negative for dysuria, hematuria and flank pain. Musculoskeletal: Positive for arthralgias. Negative for myalgias, back pain and neck stiffness. Skin: Negative for color change and rash. Allergic/Immunologic: Negative for food allergies. Neurological:  Negative for  numbness and headaches. Occasional dizziness when he stands up. Hematological: Does not bruise/bleed easily. Psychiatric/Behavioral: Negative for confusion and agitation. Physical Examination:    /84   Pulse 74   Ht 5' 10\" (1.778 m)   Wt (!) 315 lb 3.2 oz (143 kg)   BMI 45.23 kg/m²    Wt Readings from Last 3 Encounters:   03/24/22 (!) 315 lb 3.2 oz (143 kg)   02/24/22 (!) 312 lb 3.2 oz (141.6 kg)   02/17/22 300 lb (136.1 kg)     Body mass index is 45.23 kg/m². Physical Exam   Constitutional: He is oriented to person, place, and time and well-developed, well-nourished, and in no distress. HENT:   Head: Normocephalic and atraumatic. Eyes: Conjunctivae are normal. Pupils are equal, round, and reactive to light. Right eye exhibits no discharge. Neck: Normal range of motion. No JVD present. No thyromegaly present. Cardiovascular: Regular rate and rhythm. Exam reveals no friction rub. Murmur heard. Pulmonary/Chest: Effort normal and breath sounds normal. No stridor. No respiratory distress. He has no wheezes. Abdominal: Soft. Bowel sounds are normal. He exhibits no distension. There is no tenderness. Musculoskeletal: Normal range of motion. He exhibits no edema or tenderness. Neurological: He is alert and oriented to person, place, and time. No cranial nerve deficit. Skin: Skin is warm and dry. No rash noted. No erythema. Psychiatric: Mood and affect normal.           CBC:   Lab Results   Component Value Date    WBC 7.4 01/31/2022    HGB 14.9 02/17/2022    HCT 44.0 02/17/2022     01/31/2022     Lipids:   Lab Results   Component Value Date    CHOL 134 02/05/2014    TRIG 104 02/05/2014    HDL 31 (L) 02/05/2014    LDLDIRECT 93 02/05/2014     PT/INR:   Lab Results   Component Value Date    INR 1.08 01/31/2022        BMP:    Lab Results   Component Value Date     02/18/2022    K 3.7 02/18/2022     02/18/2022    CO2 25 02/18/2022    BUN 18 02/18/2022     CMP:   Lab Results   Component Value Date    AST 25 04/02/2018    PROT 6.7 04/02/2018    BILITOT 0.6 04/02/2018    ALKPHOS 56 04/02/2018     TSH:    No results found for: TSH          IMPRESSION / RECOMMENDATIONS:     1.Status post ablation of left atrial roof-dependent atrial flutter  2. Status post ablation of mitral valve annulus dependent flutter  3. Monitoring on Tikosyn therapy  4. Persistent atrial fibrillation status post cardioversion and now in sinus by V paced-devious ablation at [facility]  CAD  5. Severe left ventricular systolic dysfunction  6. Status post BiV ICD  7. Hypertension    EKG obtained patient noted to be in sinus rhythm. Patient is BiV paced  -this is within range to continue Tikosyn 250 mg twice daily  ICD interrogated patient has had no further episodes of atrial fibrillation or atrial flutter since the ablation    Patient denies issues with bleeding, we will continue Eliquis 5 mg twice daily    Vitals:    03/24/22 0938   BP: 132/84   Pulse: 74   Blood pressure is stable continue lisinopril 20 mg daily and Coreg 25 mg twice daily    Device Assessment:     The device is [employer] BIVICD . Device interrogation was performed.      Mode : DDD     Sensing is normal. Impedence is normal.  Threshold is normal.     There has not been interval changes. Estimated battery life is 8.1 years    Atrial Arrhythmia : No    Non sustained VT episodes : 1    Sustained VT episodes : No      Patient activity reported by the device to be 3.7 hr/day     The underlying rhythm is AS,.  61.4 % atrial paced; 98.2 % ventricular paced. The patient is pacemaker dependent.       HF management parameter are with in normal limits    To follow-up in 3 months      JETT Benítez - CNP

## 2022-03-24 NOTE — PATIENT INSTRUCTIONS
Please be informed that if you contact our office outside of normal business hours the physician on call cannot help with any scheduling or rescheduling issues, procedure instruction questions or any type of medication issue. We advise you for any urgent/emergency that you go to the nearest emergency room! PLEASE CALL OUR OFFICE DURING NORMAL BUSINESS HOURS    Monday - Friday   8 am to 5 pm    [place]: [place]: [phone]    [place]:  [phone]    **It is YOUR responsibilty to bring medication bottles and/or updated medication list to [address] [place] [address].  This will allow us to better serve you and all your healthcare needs**

## 2022-04-14 ENCOUNTER — TELEPHONE (OUTPATIENT)
Dept: CARDIOLOGY CLINIC | Age: 72
End: 2022-04-14

## 2022-04-14 NOTE — TELEPHONE ENCOUNTER
Pharmacy called with a question about his [place] there is a notice of a Interaction with one of his other medications

## 2022-04-14 NOTE — TELEPHONE ENCOUNTER
Patient wants Mariama Dave to give him a call. Has some questions. I asked if nurse couls help. Wants Mariama Dave .

## 2022-04-14 NOTE — TELEPHONE ENCOUNTER
Returned phone call to [place] and spoke with pharmacist. Pharmacist states patient in today to  prescription for hydrochlorothiazide. States patient has been on hydrochlorothiazide for many years, but a warning alerted to a potential interaction between patients Tikosyn medication and the hydrochlorothiazide. Pharmacist states he did reach out to the primary care provider who discontinued the hydrochlorothiazide prescription and started patient on lasix. Pharmacist just wanted to be sure we were aware. Discussed with Kevin Charles CNP, and new lasix prescription noted. Also Kevin Charles to return phone call to patient. Please see previous telephone encounter. Did update medication list, hydrochlorothiazide removed and lasix added.

## 2022-05-24 ENCOUNTER — HOSPITAL ENCOUNTER (OUTPATIENT)
Age: 72
Discharge: HOME | End: 2022-05-24
Payer: MEDICARE

## 2022-05-24 ENCOUNTER — HOSPITAL ENCOUNTER (OUTPATIENT)
Dept: LAB | Age: 72
Discharge: HOME | End: 2022-05-24
Payer: MEDICARE

## 2022-05-24 PROCEDURE — U0003 INFECTIOUS AGENT DETECTION BY NUCLEIC ACID (DNA OR RNA); SEVERE ACUTE RESPIRATORY SYNDROME CORONAVIRUS 2 (SARS-COV-2) (CORONAVIRUS DISEASE [COVID-19]), AMPLIFIED PROBE TECHNIQUE, MAKING USE OF HIGH THROUGHPUT TECHNOLOGIES AS DESCRIBED BY CMS-2020-01-R: HCPCS

## 2022-05-24 PROCEDURE — U0005 INFEC AGEN DETEC AMPLI PROBE: HCPCS

## 2022-05-25 LAB — SARS-COV-2: NOT DETECTED

## 2022-06-01 RX ORDER — DOFETILIDE 0.25 MG/1
CAPSULE ORAL
Qty: 60 CAPSULE | Refills: 3 | OUTPATIENT
Start: 2022-06-01

## 2022-06-01 RX ORDER — DOFETILIDE 0.25 MG/1
CAPSULE ORAL
Qty: 60 CAPSULE | Refills: 3 | Status: SHIPPED | OUTPATIENT
Start: 2022-06-01 | End: 2022-09-30 | Stop reason: SDUPTHER

## 2022-06-07 ENCOUNTER — PROCEDURE VISIT (OUTPATIENT)
Dept: CARDIOLOGY CLINIC | Age: 72
End: 2022-06-07
Payer: MEDICARE

## 2022-06-07 DIAGNOSIS — Z95.810 ICD (IMPLANTABLE CARDIOVERTER-DEFIBRILLATOR), BIVENTRICULAR, IN SITU: Primary | ICD-10-CM

## 2022-06-09 RX ORDER — APIXABAN 5 MG/1
TABLET, FILM COATED ORAL
Qty: 60 TABLET | Refills: 5 | Status: SHIPPED | OUTPATIENT
Start: 2022-06-09

## 2022-06-10 PROCEDURE — 93296 REM INTERROG EVL PM/IDS: CPT | Performed by: NURSE PRACTITIONER

## 2022-06-10 PROCEDURE — 93297 REM INTERROG DEV EVAL ICPMS: CPT | Performed by: NURSE PRACTITIONER

## 2022-06-10 PROCEDURE — 93295 DEV INTERROG REMOTE 1/2/MLT: CPT | Performed by: NURSE PRACTITIONER

## 2022-06-22 ENCOUNTER — OFFICE VISIT (OUTPATIENT)
Dept: CARDIOLOGY CLINIC | Age: 72
End: 2022-06-22
Payer: MEDICARE

## 2022-06-22 VITALS
BODY MASS INDEX: 43.89 KG/M2 | DIASTOLIC BLOOD PRESSURE: 86 MMHG | HEART RATE: 74 BPM | HEIGHT: 70 IN | SYSTOLIC BLOOD PRESSURE: 138 MMHG | WEIGHT: 306.6 LBS

## 2022-06-22 DIAGNOSIS — Z79.899 VISIT FOR MONITORING TIKOSYN THERAPY: ICD-10-CM

## 2022-06-22 DIAGNOSIS — Z98.890 S/P ABLATION OF ATRIAL FIBRILLATION: ICD-10-CM

## 2022-06-22 DIAGNOSIS — Z51.81 VISIT FOR MONITORING TIKOSYN THERAPY: ICD-10-CM

## 2022-06-22 DIAGNOSIS — Z98.890 S/P ABLATION OF ATRIAL FLUTTER: Primary | ICD-10-CM

## 2022-06-22 DIAGNOSIS — Z95.0 PACEMAKER: ICD-10-CM

## 2022-06-22 DIAGNOSIS — I10 HTN (HYPERTENSION), BENIGN: ICD-10-CM

## 2022-06-22 DIAGNOSIS — Z86.79 S/P ABLATION OF ATRIAL FLUTTER: Primary | ICD-10-CM

## 2022-06-22 DIAGNOSIS — Z86.79 S/P ABLATION OF ATRIAL FIBRILLATION: ICD-10-CM

## 2022-06-22 PROCEDURE — 1036F TOBACCO NON-USER: CPT | Performed by: NURSE PRACTITIONER

## 2022-06-22 PROCEDURE — 1124F ACP DISCUSS-NO DSCNMKR DOCD: CPT | Performed by: NURSE PRACTITIONER

## 2022-06-22 PROCEDURE — 3017F COLORECTAL CA SCREEN DOC REV: CPT | Performed by: NURSE PRACTITIONER

## 2022-06-22 PROCEDURE — 99214 OFFICE O/P EST MOD 30 MIN: CPT | Performed by: NURSE PRACTITIONER

## 2022-06-22 PROCEDURE — G8417 CALC BMI ABV UP PARAM F/U: HCPCS | Performed by: NURSE PRACTITIONER

## 2022-06-22 PROCEDURE — G8427 DOCREV CUR MEDS BY ELIG CLIN: HCPCS | Performed by: NURSE PRACTITIONER

## 2022-06-22 PROCEDURE — 93000 ELECTROCARDIOGRAM COMPLETE: CPT | Performed by: NURSE PRACTITIONER

## 2022-06-22 NOTE — PROGRESS NOTES
Electrophysiology follow up Note      Chief complaint: here for follow up on atrial flutter ablation and Tikosyn monitoring therapy    Referring physician:  Lui Schulz      Primary care physician: Fabio Chahal MD      History of Present Illness: This visit 6/22/2022  Patient here today for follow-up on ablation of atrial flutter. Patient reports he has been feeling well. He denies chest pain, palpitations, shortness of breath, lightheadedness, dizziness, edema or syncope. Patient overall feels he is doing well since the ablation. He is taking his medications as prescribed. Previous visit 3/24/2022  Patient is here today for follow-up on ablation of atrial flutter. Patient reports he has been feeling well. He denies chest pain, lightheadedness, dizziness, edema or syncope. He states he does have some shortness of breath with exertion but feels this is due to his weight. He is trying to lose weight. Previous visit (12/8/2021)      Chief Complaint   Patient presents with    Follow-up     Pt does have SOBOE. Pt drinks alcohol occ. Pt doesn't smoke. Pt doesn't exercise. Pt does drink 1 cup coffee daily           Previous visit: (9/8/2021)      Chief Complaint   Patient presents with    Atrial Fibrillation     Pt is here to discuss gen change. Pt denies chest pain, shortness of breath, dizziness, palpitations, and edema. Previous visit:(6/15/2018)      Chief Complaint   Patient presents with    Atrial Fibrillation     Ak-s/p cardioversion and Tikosyn 6/5. Patient denies CP, SOB, palpitations. Does report some dizziness when standing but this is occasional. Patient brought a list of his blood pressure readings. Previous visit: (5/25/2018      Chief Complaint   Patient presents with    Atrial Fibrillation     Ak-Discuss Afib on device. Patient denies CP, palpitations. Denies edema.  Does report lightheadedness upon standing at times, but subsides as soon as he gets moving. Patient has an ablation 2014 and cardioversion 2015. Patient does report using CPAP. Patient reports not usually have symptoms of Afib. States he only did the first time. Previous visit:    Chief Complaint   Patient presents with    Follow Up After Procedure     Patient had KVNG and DCCV done 5/18/15. Patient states he does feel any different now when at rest than he did before DCCV. Denies chest pain, dizziness, shortness of breath, palpitations, and edema. He reports though he is able to do more garden work and is more active though. Past Medical History:   Diagnosis Date    Atrial fibrillation [facility])     Cardiac pacemaker 2/19/2014    BIV ICD Serial # BL [id number] Moderl # [id number]    [facility]omy[facility] [address]     COPD (chronic obstructive pulmonary disease) ([facility] [address]     Sees Dr. Blood    Hematoma - postoperative 02/19/2014    Pseudo aneurism post op at [address] groin    History of blood transfusion 2014    No Reaction To Blood Transfusion Received    History of cardiac cath 12/4/2013 12/13 EF30% mild LAD, CX     History of cardiovascular disorder 12/4/2013    EF 29%, mild ischemia RCA    History of cardioversion 12/6/2013 12/13 unsuccessful    History of cardioversion 5/18/15    DCCV-.      History of echocardiogram 04/01/2019    History of transesophageal echocardiography (KVNG) for monitoring 12/4/2013 12/13 no clots    Hyperlipidemia     Hypertelorism     Hypertension     Obesity     S/P ablation of atrial fibrillation 02/14/2014    for a-fib by dr. Concetta Shannon S/P cardiac cath 2/5/14    Shortness of breath on exertion     Sleep apnea     Uses CPAP    Teeth missing     Upper And Lower    Wears glasses     [place] teeth extracted     4 [place] Teeth Extracted In Past       Past Surgical History:   Procedure Laterality Date    ABLATION OF DYSRHYTHMIC FOCUS  02/17/2022    atrial flutter ablation    ATRIAL ABLATION SURGERY  02/21/2014    for a-fib    CARDIAC DEFIBRILLATOR PLACEMENT  02/19/2014    Medtronic Viva S CRT-D Defibrillator Implanted    CARDIAC DEFIBRILLATOR PLACEMENT Left 10/21/2021    BIVICD Generator change - Medtronic Claria MRI CRT-D SureScan    CARDIOVERSION  05/18/2015    KVNG/Cardioversion    COLONOSCOPY  Early 2000's    DENTAL SURGERY      Teeth Extracted In Past    HERNIA REPAIR Right 11/10/2017    inguinal hernia repair with mesh/ robotic    KNEE ARTHROSCOPY Left 1999    TOTAL KNEE ARTHROPLASTY Left 08/2019    WISDOM TOOTH EXTRACTION      4 [place] Teeth Extracted In Past       Family History   Problem Relation Age of Onset    High Blood Pressure Brother     Heart Disease Brother     Cancer Mother         Cancer Unsure What Kind    Early Death Mother 44        Cancer Unsure What Kind    Heart Attack Father     Heart Disease Father         Heart Attack    Diabetes Father         reports that he quit smoking about 48 years ago. His smoking use included cigarettes and cigars. He started smoking about 52 years ago. He has a 4.00 pack-year smoking history. He has never used smokeless tobacco. He reports current alcohol use. He reports that he does not use drugs. No Known Allergies    Current Outpatient Medications   Medication Sig Dispense Refill    ELIQUIS 5 MG TABS tablet Take 1 tablet by mouth 2 times daily 60 tablet 5    dofetilide (TIKOSYN) 250 mcg capsule Take one tablet by mouth twice daily 60 capsule 3    Furosemide (LASIX PO) Take by mouth      Cholecalciferol (VITAMIN D3 PO) Take by mouth      lisinopril (PRINIVIL;ZESTRIL) 20 MG tablet TAKE 1 TABLET BY MOUTH TWICE DAILY 180 tablet 1    allopurinol (ZYLOPRIM) 300 MG tablet Take 300 mg by mouth daily      Multiple Vitamins-Minerals (MULTIVITAMIN PO) Take by mouth every morning Over The Counter      CPAP Machine MISC nightly       carvedilol (COREG) 25 MG tablet Take 1 tablet by mouth 2 times daily (with meals).  60 tablet 5 No current facility-administered medications for this visit. Review of Systems:   Review of Systems   Constitutional: Denies fatigue. Negative for fever, chills and activity change. HENT: Negative for congestion, ear pain and tinnitus. Eyes: Negative for photophobia, pain and visual disturbance. Respiratory: shortness of breath. Negative for cough, chest tightness and wheezing. Cardiovascular: No palpitations. Negative for chest pain and leg swelling. Gastrointestinal: Negative for nausea, vomiting, abdominal pain, diarrhea, constipation and blood in stool. Endocrine: Negative for cold intolerance and heat intolerance. Genitourinary: Negative for dysuria, hematuria and flank pain. Musculoskeletal: Positive for arthralgias. Negative for myalgias, back pain and neck stiffness. Skin: Negative for color change and rash. Allergic/Immunologic: Negative for food allergies. Neurological:  Negative for  numbness and headaches. Occasional dizziness when he stands up. Hematological: Does not bruise/bleed easily. Psychiatric/Behavioral: Negative for confusion and agitation. Physical Examination:    /86   Pulse 74   Ht 5' 10\" (1.778 m)   Wt (!) 306 lb 9.6 oz (139.1 kg)   BMI 43.99 kg/m²    Wt Readings from Last 3 Encounters:   06/22/22 (!) 306 lb 9.6 oz (139.1 kg)   03/24/22 (!) 315 lb 3.2 oz (143 kg)   02/24/22 (!) 312 lb 3.2 oz (141.6 kg)     Body mass index is 43.99 kg/m². Physical Exam   Constitutional: He is oriented to person, place, and time and well-developed, well-nourished, and in no distress. HENT:   Head: Normocephalic and atraumatic. Eyes: Conjunctivae are normal. Pupils are equal, round, and reactive to light. Right eye exhibits no discharge. Neck: Normal range of motion. No JVD present. No thyromegaly present. Cardiovascular: Regular rate and rhythm. Exam reveals no friction rub. Murmur heard.   Pulmonary/Chest: Effort normal and breath sounds normal. No stridor. No respiratory distress. He has no wheezes. Abdominal: Soft. Bowel sounds are normal. He exhibits no distension. There is no tenderness. Musculoskeletal: Normal range of motion. He exhibits no edema or tenderness. Neurological: He is alert and oriented to person, place, and time. No cranial nerve deficit. Skin: Skin is warm and dry. No rash noted. No erythema. Psychiatric: Mood and affect normal.           CBC:   Lab Results   Component Value Date    WBC 7.4 01/31/2022    HGB 14.9 02/17/2022    HCT 44.0 02/17/2022     01/31/2022     Lipids:   Lab Results   Component Value Date    CHOL 134 02/05/2014    TRIG 104 02/05/2014    HDL 31 (L) 02/05/2014    LDLDIRECT 93 02/05/2014     PT/INR:   Lab Results   Component Value Date    INR 1.08 01/31/2022        BMP:    Lab Results   Component Value Date     02/18/2022    K 3.7 02/18/2022     02/18/2022    CO2 25 02/18/2022    BUN 18 02/18/2022     CMP:   Lab Results   Component Value Date    AST 25 04/02/2018    PROT 6.7 04/02/2018    BILITOT 0.6 04/02/2018    ALKPHOS 56 04/02/2018     TSH:    No results found for: TSH          IMPRESSION / RECOMMENDATIONS:     1.Status post ablation of left atrial roof-dependent atrial flutter  2. Status post ablation of mitral valve annulus dependent flutter  3. Monitoring on Tikosyn therapy  4. Persistent atrial fibrillation status post cardioversion and now in sinus by V paced-devious ablation at [address] [place] [address]  5. Severe left ventricular systolic dysfunction  6. Status post BiV ICD  7. Hypertension    EKG obtained patient noted to be in sinus rhythm. Patient is BiV paced  -this is within range to continue Tikosyn 250 mg twice daily  ICD interrogation since 6/5/2022.   No A. fib noted on interrogation    Patient denies issues with bleeding, we will continue Eliquis 5 mg twice daily    Vitals:    06/22/22 1431   BP: 138/86   Pulse: 74   Blood pressure is stable continue lisinopril 20 mg daily and Coreg 25 mg twice daily      Patient to follow-up in 6 months or sooner if needed

## 2022-07-18 ENCOUNTER — OFFICE VISIT (OUTPATIENT)
Dept: CARDIOLOGY CLINIC | Age: 72
End: 2022-07-18
Payer: MEDICARE

## 2022-07-18 VITALS
HEIGHT: 70 IN | SYSTOLIC BLOOD PRESSURE: 148 MMHG | WEIGHT: 310.6 LBS | BODY MASS INDEX: 44.47 KG/M2 | DIASTOLIC BLOOD PRESSURE: 88 MMHG | HEART RATE: 76 BPM | RESPIRATION RATE: 18 BRPM

## 2022-07-18 DIAGNOSIS — Z86.79 S/P ABLATION OF ATRIAL FLUTTER: Primary | ICD-10-CM

## 2022-07-18 DIAGNOSIS — Z98.890 S/P ABLATION OF ATRIAL FLUTTER: Primary | ICD-10-CM

## 2022-07-18 PROCEDURE — 99214 OFFICE O/P EST MOD 30 MIN: CPT | Performed by: INTERNAL MEDICINE

## 2022-07-18 PROCEDURE — 1124F ACP DISCUSS-NO DSCNMKR DOCD: CPT | Performed by: INTERNAL MEDICINE

## 2022-07-18 NOTE — PROGRESS NOTES
Clint Nazario  is a  Established patient  ,81 y.o.   male here for evaluation of the following chief complaint(s):    DCM        SUBJECTIVE/OBJECTIVE:  HPI : h/o  DCM, a fib, ICD now here  Has no complains    Review of Systems    neg    Vitals:    07/18/22 1309   BP: (!) 148/88   Site: Left Upper Arm   Position: Sitting   Cuff Size: Large Adult   Pulse: 76   Resp: 18   Weight: (!) 310 lb 9.6 oz (140.9 kg)   Height: 5' 10\" (1.778 m)       BP (!) 148/88 (Site: Left Upper Arm, Position: Sitting, Cuff Size: Large Adult)   Pulse 76   Resp 18   Ht 5' 10\" (1.778 m)   Wt (!) 310 lb 9.6 oz (140.9 kg)   BMI 44.57 kg/m²   Patient-Reported Vitals 7/27/2020   Patient-Reported Weight 268 lbs   Patient-Reported Height 5' 10\"   Patient-Reported Systolic 730   Patient-Reported Diastolic 78   Patient-Reported Pulse 71   Patient-Reported Temperature 97.7     Wt Readings from Last 3 Encounters:   07/18/22 (!) 310 lb 9.6 oz (140.9 kg)   06/22/22 (!) 306 lb 9.6 oz (139.1 kg)   03/24/22 (!) 315 lb 3.2 oz (143 kg)     Body mass index is 44.57 kg/m². Physical Exam     Neck: JVD      Lungs : clear    Cardio : Si and S2 audilble      Ext: edema      All pertinent data reviewed      Meds : reviewed         Tests ordered    ICD check    ASSESSMENT/PLAN:    - DCM  On meds  EF has improved per echo    - Atrial fibrillation, pt is  compliant with meds. Patient does not have symptoms from atrial fibrillation  Sp ablation    - ICD: carelink    ICD Analysis: carelink reviewed         - NSVT   Stable no episodes    - ANTOINE on cpap    - Atrial fibrillation, pt is  compliant with meds. Patient does not have symptoms from atrial fibrillation\    Mortality from the morbid obesity is very high: Body mass index is 44.57 kg/m². An electronic signature was used to authenticate this note.     --Josr Andrade MD

## 2022-08-22 ENCOUNTER — OFFICE VISIT (OUTPATIENT)
Dept: PULMONOLOGY | Age: 72
End: 2022-08-22
Payer: MEDICARE

## 2022-08-22 VITALS
BODY MASS INDEX: 44.38 KG/M2 | DIASTOLIC BLOOD PRESSURE: 86 MMHG | SYSTOLIC BLOOD PRESSURE: 126 MMHG | WEIGHT: 310 LBS | HEIGHT: 70 IN | HEART RATE: 71 BPM | OXYGEN SATURATION: 97 %

## 2022-08-22 DIAGNOSIS — G47.33 OBSTRUCTIVE SLEEP APNEA: Primary | ICD-10-CM

## 2022-08-22 PROCEDURE — 99213 OFFICE O/P EST LOW 20 MIN: CPT | Performed by: INTERNAL MEDICINE

## 2022-08-22 PROCEDURE — G8427 DOCREV CUR MEDS BY ELIG CLIN: HCPCS | Performed by: INTERNAL MEDICINE

## 2022-08-22 PROCEDURE — G8417 CALC BMI ABV UP PARAM F/U: HCPCS | Performed by: INTERNAL MEDICINE

## 2022-08-22 PROCEDURE — 1036F TOBACCO NON-USER: CPT | Performed by: INTERNAL MEDICINE

## 2022-08-22 PROCEDURE — 1124F ACP DISCUSS-NO DSCNMKR DOCD: CPT | Performed by: INTERNAL MEDICINE

## 2022-08-22 PROCEDURE — 3017F COLORECTAL CA SCREEN DOC REV: CPT | Performed by: INTERNAL MEDICINE

## 2022-08-22 NOTE — PROGRESS NOTES
SUBJECTIVE:  Chief Complaint: Rectal sleep apnea on CPAP  Mr. Joaquin Valenzuela states that he has had no known exposures or infection associated with COVID-19 and he has received all 4 COVID-19 vaccinations. He states that in May of this year he did have a viral infection and was diagnosed with influenza. This did affect his ability to wear CPAP. He continues to wear CPAP around 7 hours per night and continues to get an excellent clinical benefit with less daytime sleepiness and improve daytime energy. ROS:  Constitution:  HEENT: Negative for ear, throat pain  Cardiovascular: Negative for chest pain, syncope, edema  Pulmonary: See HPI  Musculoskeletal: Negative for DVT, myalgias, arthralgias    OBJECTIVE:  /86   Pulse 71   Ht 5' 10\" (1.778 m)   Wt (!) 310 lb (140.6 kg)   SpO2 97%   BMI 44.48 kg/m²      Physical Exam:  Constitutional:  He appears well developed and well-nourished. Moderately overweight  Neck:  Supple, No palpable lymphadenopathy, No JVD  Cardiovascular:  S1, S2 Normal, Regular rhythm, no murmurs or gallops, No pericardial  rubs. Pulmonary: Breath sounds are clear throughout all areas without wheezing or rhonchi  Abdomen: Not examined  Extremities: no edema, No DVT  Neurologic: Oriented x3, No focal deficits    Radiology: Chest x-ray on 12/30/2021 showed stable cardiomegaly with pulmonary vas congestion. Slight increased right airspace opacity which may reflect atelectasis or pneumonia in the appropriate clinical setting. PFT: Office spirometry on 12/11/2018 demonstrated no significant airways obstruction with a borderline response of bronchodilators      Echocardiogram: Limited echo on 12/17/2022  This is a limited study s/p ablation to r/o pericardial effusion. Left ventricular systolic function is normal.   Ejection fraction is visually estimated at 50-55%. No significant valvular abnormalities.    No evidence of any pericardial effusion  ASSESSMENT:    1. Obstructive sleep apnea PLAN:  I did encourage him to continue wearing CPAP nightly as he has had an excellent clinical benefit from its use. Because of my anticipated halfway later this month I will have Dr. Gumaro Bloom and [facility] pulmonary continue to follow him for his ANTOINE and CPAP needs. We have discussed the need to maintain yearly flu immunization, pneumococcal vaccination. We have discussed Coronavirus precaution including handwashing practice, wiping items touched in public such as gas pumps, door handles, shopping carts, etc. Self monitoring for infection - fever, chills, cough, SOB. Should they develop symptoms they should call office for further instructions. Return in about 1 year (around 8/22/2023) for Recheck for Obstructive Sleep Apnea. This dictation was performed with a verbal recognition program and it was checked for errors. It is possible that there are still dictated errors within this office note. Any errors should be brought immediately to my attention for correction. All efforts were made to ensure that this office note is accurate.

## 2022-09-11 PROCEDURE — 93295 DEV INTERROG REMOTE 1/2/MLT: CPT | Performed by: NURSE PRACTITIONER

## 2022-09-11 PROCEDURE — 93297 REM INTERROG DEV EVAL ICPMS: CPT | Performed by: NURSE PRACTITIONER

## 2022-09-11 PROCEDURE — 93296 REM INTERROG EVL PM/IDS: CPT | Performed by: NURSE PRACTITIONER

## 2022-09-13 ENCOUNTER — PROCEDURE VISIT (OUTPATIENT)
Dept: CARDIOLOGY CLINIC | Age: 72
End: 2022-09-13
Payer: MEDICARE

## 2022-09-13 DIAGNOSIS — Z95.810 ICD (IMPLANTABLE CARDIOVERTER-DEFIBRILLATOR), BIVENTRICULAR, IN SITU: Primary | ICD-10-CM

## 2022-09-30 RX ORDER — DOFETILIDE 0.25 MG/1
CAPSULE ORAL
Qty: 60 CAPSULE | Refills: 3 | Status: SHIPPED | OUTPATIENT
Start: 2022-09-30

## 2022-09-30 RX ORDER — DOFETILIDE 0.25 MG/1
CAPSULE ORAL
Qty: 60 CAPSULE | Refills: 3 | OUTPATIENT
Start: 2022-09-30

## 2022-11-28 RX ORDER — APIXABAN 5 MG/1
TABLET, FILM COATED ORAL
Qty: 60 TABLET | Refills: 5 | Status: SHIPPED | OUTPATIENT
Start: 2022-11-28

## 2022-12-05 ENCOUNTER — OFFICE VISIT (OUTPATIENT)
Dept: CARDIOLOGY CLINIC | Age: 72
End: 2022-12-05
Payer: MEDICARE

## 2022-12-05 VITALS
DIASTOLIC BLOOD PRESSURE: 82 MMHG | BODY MASS INDEX: 45.1 KG/M2 | WEIGHT: 315 LBS | SYSTOLIC BLOOD PRESSURE: 154 MMHG | HEIGHT: 70 IN | HEART RATE: 72 BPM

## 2022-12-05 DIAGNOSIS — I10 HTN (HYPERTENSION), BENIGN: ICD-10-CM

## 2022-12-05 DIAGNOSIS — Z79.899 VISIT FOR MONITORING TIKOSYN THERAPY: ICD-10-CM

## 2022-12-05 DIAGNOSIS — Z51.81 VISIT FOR MONITORING TIKOSYN THERAPY: ICD-10-CM

## 2022-12-05 DIAGNOSIS — Z86.79 S/P ABLATION OF ATRIAL FLUTTER: Primary | ICD-10-CM

## 2022-12-05 DIAGNOSIS — Z98.890 S/P ABLATION OF ATRIAL FLUTTER: Primary | ICD-10-CM

## 2022-12-05 DIAGNOSIS — Z86.79 S/P ABLATION OF ATRIAL FIBRILLATION: ICD-10-CM

## 2022-12-05 DIAGNOSIS — Z95.810 ICD (IMPLANTABLE CARDIOVERTER-DEFIBRILLATOR), BIVENTRICULAR, IN SITU: ICD-10-CM

## 2022-12-05 DIAGNOSIS — Z98.890 S/P ABLATION OF ATRIAL FIBRILLATION: ICD-10-CM

## 2022-12-05 PROCEDURE — 93289 INTERROG DEVICE EVAL HEART: CPT | Performed by: NURSE PRACTITIONER

## 2022-12-05 PROCEDURE — 3078F DIAST BP <80 MM HG: CPT | Performed by: NURSE PRACTITIONER

## 2022-12-05 PROCEDURE — 3017F COLORECTAL CA SCREEN DOC REV: CPT | Performed by: NURSE PRACTITIONER

## 2022-12-05 PROCEDURE — G8484 FLU IMMUNIZE NO ADMIN: HCPCS | Performed by: NURSE PRACTITIONER

## 2022-12-05 PROCEDURE — G8427 DOCREV CUR MEDS BY ELIG CLIN: HCPCS | Performed by: NURSE PRACTITIONER

## 2022-12-05 PROCEDURE — 3074F SYST BP LT 130 MM HG: CPT | Performed by: NURSE PRACTITIONER

## 2022-12-05 PROCEDURE — 1124F ACP DISCUSS-NO DSCNMKR DOCD: CPT | Performed by: NURSE PRACTITIONER

## 2022-12-05 PROCEDURE — 99214 OFFICE O/P EST MOD 30 MIN: CPT | Performed by: NURSE PRACTITIONER

## 2022-12-05 PROCEDURE — G8417 CALC BMI ABV UP PARAM F/U: HCPCS | Performed by: NURSE PRACTITIONER

## 2022-12-05 PROCEDURE — 1036F TOBACCO NON-USER: CPT | Performed by: NURSE PRACTITIONER

## 2022-12-05 RX ORDER — CELECOXIB 200 MG/1
CAPSULE ORAL
COMMUNITY
Start: 2022-12-01

## 2022-12-05 RX ORDER — DOFETILIDE 0.25 MG/1
CAPSULE ORAL
Qty: 60 CAPSULE | Refills: 3 | Status: SHIPPED | OUTPATIENT
Start: 2022-12-05

## 2022-12-05 NOTE — PATIENT INSTRUCTIONS
Please be informed that if you contact our office outside of normal business hours the physician on call cannot help with any scheduling or rescheduling issues, procedure instruction questions or any type of medication issue. We advise you for any urgent/emergency that you go to the nearest emergency room! PLEASE CALL OUR OFFICE DURING NORMAL BUSINESS HOURS    Monday - Friday   8 am to 5 pm    [address]: [phone]    [place]:  [phone]    **It is YOUR responsibilty to bring medication bottles and/or updated medication list to [address] [place] [address]. This will allow us to better serve you and all your healthcare needs**    Thank you for allowing us to care for you today! We want to ensure we can follow your treatment plan and we strive to give you the best outcomes and experience possible. If you ever have a life threatening emergency and call 911 - for an ambulance (EMS)   Our providers can only care for you at:   [facility] or [facility]. Even if you have someone take you or you drive yourself we can only care for you in a [address] [facility] facility. Our providers are not setup at the other healthcare locations!

## 2022-12-05 NOTE — PROGRESS NOTES
Electrophysiology follow up Note      Chief complaint: here for follow up on atrial flutter ablation and Tikosyn monitoring therapy    Referring physician:  Simone Patino      Primary care physician: Rebecca Vasquez MD      History of Present Illness: This visit 12/5/2022  Patient here today for follow-up on ablation of atrial flutter and atrial fibrillation. Patient reports he feels well. He denies chest pain, palpitations, shortness of breath, lightheadedness, dizziness, edema or syncope. He denies issues with bleeding. He is taking his medications as prescribed. Previous visit 6/22/2022  Patient here today for follow-up on ablation of atrial flutter. Patient reports he has been feeling well. He denies chest pain, palpitations, shortness of breath, lightheadedness, dizziness, edema or syncope. Patient overall feels he is doing well since the ablation. He is taking his medications as prescribed. Previous visit 3/24/2022  Patient is here today for follow-up on ablation of atrial flutter. Patient reports he has been feeling well. He denies chest pain, lightheadedness, dizziness, edema or syncope. He states he does have some shortness of breath with exertion but feels this is due to his weight. He is trying to lose weight. Previous visit (12/8/2021)      Chief Complaint   Patient presents with    Follow-up     Pt does have SOBOE. Pt drinks alcohol occ. Pt doesn't smoke. Pt doesn't exercise. Pt does drink 1 cup coffee daily           Previous visit: (9/8/2021)      Chief Complaint   Patient presents with    Atrial Fibrillation     Pt is here to discuss gen change. Pt denies chest pain, shortness of breath, dizziness, palpitations, and edema. Previous visit:(6/15/2018)      Chief Complaint   Patient presents with    Atrial Fibrillation     Ak-s/p cardioversion and Tikosyn 6/5. Patient denies CP, SOB, palpitations.  Does report some dizziness when standing but this is occasional. Patient brought a list of his blood pressure readings. Previous visit: (5/25/2018      Chief Complaint   Patient presents with    Atrial Fibrillation     Ak-Discuss Afib on device. Patient denies CP, palpitations. Denies edema. Does report lightheadedness upon standing at times, but subsides as soon as he gets moving. Patient has an ablation 2014 and cardioversion 2015. Patient does report using CPAP. Patient reports not usually have symptoms of Afib. States he only did the first time. Previous visit:    Chief Complaint   Patient presents with    Follow Up After Procedure     Patient had KVNG and DCCV done 5/18/15. Patient states he does feel any different now when at rest than he did before DCCV. Denies chest pain, dizziness, shortness of breath, palpitations, and edema. He reports though he is able to do more garden work and is more active though. Past Medical History:   Diagnosis Date    Atrial fibrillation [facility])     Cardiac pacemaker 2/19/2014    BIV ICD Serial # BL [id number] Moderl # [id number]    Cardiomyopathy ([facility] [address]     COPD (chronic obstructive pulmonary disease) ([facility] [address]     Sees Dr. 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      History of echocardiogram 04/01/2019    History of transesophageal echocardiography (KVNG) for monitoring 12/4/2013 12/13 no clots    Hyperlipidemia     Hypertelorism     Hypertension     Obesity     S/P ablation of atrial fibrillation 02/14/2014    for a-fib by dr. Glenis Hunt    S/P cardiac cath 2/5/14    Shortness of breath on (PRINIVIL;ZESTRIL) 20 MG tablet TAKE 1 TABLET BY MOUTH TWICE DAILY 180 tablet 1    allopurinol (ZYLOPRIM) 300 MG tablet Take 300 mg by mouth daily      Multiple Vitamins-Minerals (MULTIVITAMIN PO) Take by mouth every morning Over The Counter      CPAP Machine MISC nightly       carvedilol (COREG) 25 MG tablet Take 1 tablet by mouth 2 times daily (with meals). 60 tablet 5     No current facility-administered medications for this visit. Review of Systems:   Review of Systems   Constitutional: Denies fatigue. Negative for fever, chills and activity change. HENT: Negative for congestion, ear pain and tinnitus. Eyes: Negative for photophobia, pain and visual disturbance. Respiratory: shortness of breath. Negative for cough, chest tightness and wheezing. Cardiovascular: No palpitations. Negative for chest pain and leg swelling. Gastrointestinal: Negative for nausea, vomiting, abdominal pain, diarrhea, constipation and blood in stool. Endocrine: Negative for cold intolerance and heat intolerance. Genitourinary: Negative for dysuria, hematuria and flank pain. Musculoskeletal: Positive for arthralgias. Negative for myalgias, back pain and neck stiffness. Skin: Negative for color change and rash. Allergic/Immunologic: Negative for food allergies. Neurological:  Negative for  numbness and headaches. Hematological: Does not bruise/bleed easily. Psychiatric/Behavioral: Negative for confusion and agitation. Physical Examination:    BP (!) 154/82 (Site: Left Upper Arm, Position: Sitting, Cuff Size: Large Adult)   Pulse 72   Ht 5' 10\" (1.778 m)   Wt (!) 322 lb 6.4 oz (146.2 kg)   BMI 46.26 kg/m²    Wt Readings from Last 3 Encounters:   12/05/22 (!) 322 lb 6.4 oz (146.2 kg)   08/22/22 (!) 310 lb (140.6 kg)   07/18/22 (!) 310 lb 9.6 oz (140.9 kg)     Body mass index is 46.26 kg/m².       Physical Exam   Constitutional: He is oriented to person, place, and time and well-developed, well-nourished, and in no distress. HENT:   Head: Normocephalic and atraumatic. Eyes: Conjunctivae are normal. Pupils are equal, round, and reactive to light. Right eye exhibits no discharge. Neck: Normal range of motion. No JVD present. No thyromegaly present. Cardiovascular: Regular rate and rhythm. Exam reveals no friction rub. Murmur heard. Pulmonary/Chest: Effort normal and breath sounds normal. No stridor. No respiratory distress. He has no wheezes. Abdominal: Soft. Bowel sounds are normal. He exhibits no distension. There is no tenderness. Musculoskeletal: Normal range of motion. He exhibits no edema or tenderness. Neurological: He is alert and oriented to person, place, and time. No cranial nerve deficit. Skin: Skin is warm and dry. No rash noted. No erythema. Psychiatric: Mood and affect normal.           CBC:   Lab Results   Component Value Date/Time    WBC 7.4 01/31/2022 02:32 PM    HGB 14.9 02/17/2022 09:00 AM    HCT 44.0 02/17/2022 09:00 AM     01/31/2022 02:32 PM     Lipids:   Lab Results   Component Value Date    CHOL 134 02/05/2014    TRIG 104 02/05/2014    HDL 31 (L) 02/05/2014    LDLDIRECT 93 02/05/2014     PT/INR:   Lab Results   Component Value Date/Time    INR 1.08 01/31/2022 02:32 PM        BMP:    Lab Results   Component Value Date     02/18/2022    K 3.7 02/18/2022     02/18/2022    CO2 25 02/18/2022    BUN 18 02/18/2022     CMP:   Lab Results   Component Value Date    AST 25 04/02/2018    PROT 6.7 04/02/2018    BILITOT 0.6 04/02/2018    ALKPHOS 56 04/02/2018     TSH:    No results found for: TSH          IMPRESSION / RECOMMENDATIONS:     1.Status post ablation of left atrial roof-dependent atrial flutter  2. Status post ablation of mitral valve annulus dependent flutter  3. Monitoring on Tikosyn therapy  4. Persistent atrial fibrillation status post cardioversion and now in sinus by V paced-devious ablation at [facility]  CAD  5. Severe left ventricular systolic dysfunction  6. Status post BiV ICD  7. Hypertension    EKG obtained patient noted to be in sinus rhythm. Patient is BiV paced  -this is within range to continue Tikosyn 250 mg twice daily  ICD interrogation   patient had 1 episode of atrial fibrillation lasting 63 minutes on October 21. Patient was asymptomatic    Patient denies issues with bleeding, we will continue Eliquis 5 mg twice daily    Vitals:    12/05/22 0918   BP: (!) 154/82   Pulse:    Blood pressure is stable continue lisinopril 20 mg daily and Coreg 25 mg twice daily. Patient has not taken blood pressure medications this morning    Patient had recent labs and February 2022. Patient will need to have yearly labs at next office visit if not done by PCP      Patient to follow-up in 9 months or sooner if needed    Device Assessment:     The device is [employer]ator BIVICD . Device interrogation was performed. Mode : DDD     Sensing is normal. Impedence is normal.  Threshold is normal.     There has not been interval changes. Estimated battery life is 6.8 years    Atrial Arrhythmia : 1 episode lasting 63 seconds    Non sustained VT episodes : 4 epsides    Sustained VT episodes : No      Patient activity reported by the device to be 3.6 hr/day     The underlying rhythm is AP,.  51.5 % atrial paced; 98.7 % ventricular paced. The patient is pacemaker dependent.       HF management parameter are with in normal limits

## 2022-12-20 ENCOUNTER — TELEPHONE (OUTPATIENT)
Dept: CARDIOLOGY CLINIC | Age: 72
End: 2022-12-20

## 2022-12-20 ENCOUNTER — PROCEDURE VISIT (OUTPATIENT)
Dept: CARDIOLOGY CLINIC | Age: 72
End: 2022-12-20

## 2022-12-20 DIAGNOSIS — Z95.810 ICD (IMPLANTABLE CARDIOVERTER-DEFIBRILLATOR), BIVENTRICULAR, IN SITU: Primary | ICD-10-CM

## 2022-12-20 NOTE — LETTER
Cardiology [address]  Phone: [phone]  Fax: [phone]    12/20/2022        Damian Ovi  [address] [place], [place] [id number]            Dear Jared Ragland: This is your [employer] schedule. You can justo your calendar with these dates. Remember that your device is wireless and should automatically do these checks while you are sleeping. If for any reason I do not get your transmission then I will call you and ask that you send a manual transmission. If you have any questions or concerns, please call and ask for Vernon Watt at [phone]. Thank you.

## 2023-01-27 RX ORDER — DOFETILIDE 0.25 MG/1
CAPSULE ORAL
Qty: 60 CAPSULE | Refills: 3 | Status: SHIPPED | OUTPATIENT
Start: 2023-01-27

## 2023-03-06 ENCOUNTER — OFFICE VISIT (OUTPATIENT)
Dept: CARDIOLOGY CLINIC | Age: 73
End: 2023-03-06
Payer: MEDICARE

## 2023-03-06 VITALS
HEART RATE: 80 BPM | SYSTOLIC BLOOD PRESSURE: 148 MMHG | WEIGHT: 315 LBS | DIASTOLIC BLOOD PRESSURE: 94 MMHG | BODY MASS INDEX: 46.06 KG/M2

## 2023-03-06 DIAGNOSIS — I48.92 ATRIAL FLUTTER, UNSPECIFIED TYPE (HCC): Primary | ICD-10-CM

## 2023-03-06 DIAGNOSIS — Z95.810 ICD (IMPLANTABLE CARDIOVERTER-DEFIBRILLATOR), BIVENTRICULAR, IN SITU: ICD-10-CM

## 2023-03-06 DIAGNOSIS — I10 HTN (HYPERTENSION), BENIGN: ICD-10-CM

## 2023-03-06 PROCEDURE — 3077F SYST BP >= 140 MM HG: CPT | Performed by: NURSE PRACTITIONER

## 2023-03-06 PROCEDURE — G8427 DOCREV CUR MEDS BY ELIG CLIN: HCPCS | Performed by: NURSE PRACTITIONER

## 2023-03-06 PROCEDURE — 3080F DIAST BP >= 90 MM HG: CPT | Performed by: NURSE PRACTITIONER

## 2023-03-06 PROCEDURE — 1124F ACP DISCUSS-NO DSCNMKR DOCD: CPT | Performed by: NURSE PRACTITIONER

## 2023-03-06 PROCEDURE — 99214 OFFICE O/P EST MOD 30 MIN: CPT | Performed by: NURSE PRACTITIONER

## 2023-03-06 PROCEDURE — G8417 CALC BMI ABV UP PARAM F/U: HCPCS | Performed by: NURSE PRACTITIONER

## 2023-03-06 PROCEDURE — G8484 FLU IMMUNIZE NO ADMIN: HCPCS | Performed by: NURSE PRACTITIONER

## 2023-03-06 PROCEDURE — 1036F TOBACCO NON-USER: CPT | Performed by: NURSE PRACTITIONER

## 2023-03-06 PROCEDURE — 3017F COLORECTAL CA SCREEN DOC REV: CPT | Performed by: NURSE PRACTITIONER

## 2023-03-06 RX ORDER — LISINOPRIL 20 MG/1
20 TABLET ORAL DAILY
Qty: 90 TABLET | Refills: 3 | Status: SHIPPED | OUTPATIENT
Start: 2023-03-06

## 2023-03-06 ASSESSMENT — ENCOUNTER SYMPTOMS
SHORTNESS OF BREATH: 0
ORTHOPNEA: 0

## 2023-03-06 NOTE — PROGRESS NOTES
3/6/2023  Primary cardiologist: Dr. Jose Roberto Santos:   Tara Mueller  is an established 81 y.o.  male here for a 6 month follow up on atrial fib       SUBJECTIVE/OBJECTIVE:  Tara Mueller is a 81 y.o. male with a history of atrial flutter s/p ablation of left atrial roof line, ablation of mitral valve annulus, Tikosyn therapy, persistent atrial fibrillation with history of cardioversion cardioversion, dilated cardiomyopathy with severe left ventricular systolic dysfunction, status post BiV ICD, hypertension and obstructive sleep apnea    Laron Dickens had an atrial fibrillation ablation at [facility] in 2014. He had a BiV ICD placed in 2014. His BiV ICD was upgraded in October 2021. He underwent ablation of atrial fibrillation and atrial flutter with Dr. Concetta Trejo in June 2018 and February 2022  His left ventricular systolic function has improved from 30 - 35% to 50 - 55%. HPI :   Tara Mueller reports he is feeling fairly well. He denies palpations or dizziness. He denies fall or bleeding. He is using his cpap nighty. Review of Systems   Constitutional: Negative for diaphoresis and malaise/fatigue. Cardiovascular:  Negative for chest pain, claudication, dyspnea on exertion, irregular heartbeat, leg swelling, near-syncope, orthopnea, palpitations and paroxysmal nocturnal dyspnea. Respiratory:  Negative for shortness of breath. Neurological:  Negative for dizziness and light-headedness. Vitals:    03/06/23 1341   BP: (!) 148/94   Pulse: 80   Weight: (!) 321 lb (145.6 kg)     Patient-Reported Vitals 7/27/2020   Patient-Reported Weight 268 lbs   Patient-Reported Height 5' 10\"   Patient-Reported Systolic 703   Patient-Reported Diastolic 78   Patient-Reported Pulse 71   Patient-Reported Temperature 97.7     Wt Readings from Last 3 Encounters:   03/06/23 (!) 321 lb (145.6 kg)   12/05/22 (!) 322 lb 6.4 oz (146.2 kg)   08/22/22 (!) 310 lb (140.6 kg)     Body mass index is 46.06 kg/m². Physical Exam  Vitals reviewed.    Constitutional: Appearance: He is obese. HENT:      Head: Normocephalic and atraumatic. Mouth/Throat:      Mouth: Mucous membranes are moist.   Eyes:      Extraocular Movements: Extraocular movements intact. Pupils: Pupils are equal, round, and reactive to light. Neck:      Vascular: No carotid bruit. Cardiovascular:      Rate and Rhythm: Normal rate and regular rhythm. Pulses: Normal pulses. Pulmonary:      Effort: Pulmonary effort is normal.      Breath sounds: Normal breath sounds. Abdominal:      General: There is no distension. Palpations: Abdomen is soft. Tenderness: There is no abdominal tenderness. Musculoskeletal:         General: Normal range of motion. Cervical back: No tenderness. Right lower leg: No edema. Left lower leg: No edema. Skin:     General: Skin is warm and dry. Capillary Refill: Capillary refill takes less than 2 seconds. Neurological:      Mental Status: He is alert and oriented to person, place, and time. Psychiatric:         Mood and Affect: Mood normal.         Behavior: Behavior normal.              Current Outpatient Medications   Medication Sig Dispense Refill    dofetilide (TIKOSYN) 250 MCG capsule Take one tablet by mouth twice daily 60 capsule 3    celecoxib (CELEBREX) 200 MG capsule Take 1 capsule by mouth once a day as directed      apixaban (ELIQUIS) 5 MG TABS tablet TAKE 1 TABLET BY MOUTH TWICE DAILY 60 tablet 5    Cholecalciferol (VITAMIN D3 PO) Take by mouth      allopurinol (ZYLOPRIM) 300 MG tablet Take 300 mg by mouth daily      Multiple Vitamins-Minerals (MULTIVITAMIN PO) Take by mouth every morning Over The Counter      CPAP Machine MISC nightly       carvedilol (COREG) 25 MG tablet Take 1 tablet by mouth 2 times daily (with meals).  60 tablet 5    Furosemide (LASIX PO) Take by mouth (Patient not taking: Reported on 3/6/2023)      lisinopril (PRINIVIL;ZESTRIL) 20 MG tablet TAKE 1 TABLET BY MOUTH TWICE DAILY (Patient not taking: Reported on 3/6/2023) 180 tablet 1     No current facility-administered medications for this visit. All pertinent data reviewed and discussed with patient       ASSESSMENT/PLAN:    Cardiomyopathy  EF has improved and returned to normal.  Optival suggest well compensated heart failure      Atrial fibrillation/atrial flutter  Denies symptoms from atrial fib or atrial flutter. ICD analysis reviewed no A-fib/flutter appreciated. He is on Tikosyn and being follow with electrophysiology. We will continue continue with anticoagulation for stroke prevention. BiV ICD  Stable readings: Stable lead and stable battery. No significant arrhythmias appreciated. Continue with every 3-month remote monitoring. Hypertension  Blood pressure is elevated on today's office visit. We will resume lisinopril 20 mg once daily. Plan to check a BMP and blood pressure in 2 weeks    Obstructive sleep apnea  Using cpap nighlty   Sleeping well. Obesity  BMI 46.06  Class III obesity. Weight loss is encouraged as morbidity and mortality are increased with morbid obesity    Tests ordered:  BMP  Follow-up 2 weeks for blood pressure check   Office visit 9 months    Signed:  JETT Burnham CNP, 3/6/2023, 1:53 PM    An electronic signature was used to authenticate this note. Please note this report has been partially produced using speech recognition software and may contain errors related to that system including errors in grammar, punctuation, and spelling, as well as words and phrases that may be inappropriate. If there are any questions or concerns please feel free to contact the dictating provider for clarification.

## 2023-03-13 ENCOUNTER — NURSE ONLY (OUTPATIENT)
Dept: CARDIOLOGY CLINIC | Age: 73
End: 2023-03-13
Payer: MEDICARE

## 2023-03-13 DIAGNOSIS — I10 HTN (HYPERTENSION), BENIGN: ICD-10-CM

## 2023-03-13 LAB
ANION GAP SERPL CALCULATED.3IONS-SCNC: 12 MMOL/L (ref 3–16)
BUN BLDV-MCNC: 16 MG/DL (ref 7–20)
CALCIUM SERPL-MCNC: 9.7 MG/DL (ref 8.3–10.6)
CHLORIDE BLD-SCNC: 103 MMOL/L (ref 99–110)
CO2: 24 MMOL/L (ref 21–32)
CREAT SERPL-MCNC: 0.8 MG/DL (ref 0.8–1.3)
GFR SERPL CREATININE-BSD FRML MDRD: >60 ML/MIN/{1.73_M2}
GLUCOSE BLD-MCNC: 120 MG/DL (ref 70–99)
POTASSIUM SERPL-SCNC: 4.3 MMOL/L (ref 3.5–5.1)
SODIUM BLD-SCNC: 139 MMOL/L (ref 136–145)

## 2023-03-13 PROCEDURE — 36415 COLL VENOUS BLD VENIPUNCTURE: CPT | Performed by: NURSE PRACTITIONER

## 2023-03-14 ENCOUNTER — TELEPHONE (OUTPATIENT)
Dept: CARDIOLOGY CLINIC | Age: 73
End: 2023-03-14

## 2023-03-14 NOTE — TELEPHONE ENCOUNTER
Called and spoke with pt about labs.  - renal function and electrolytes look good- blood sugar is up 120-

## 2023-03-14 NOTE — TELEPHONE ENCOUNTER
Called and left VM for pt to call back for lab results.   - renal function and electrolytes look good- blood sugar is up 120-

## 2023-03-20 ENCOUNTER — NURSE ONLY (OUTPATIENT)
Dept: CARDIOLOGY CLINIC | Age: 73
End: 2023-03-20

## 2023-03-20 VITALS — DIASTOLIC BLOOD PRESSURE: 78 MMHG | SYSTOLIC BLOOD PRESSURE: 138 MMHG

## 2023-03-20 DIAGNOSIS — I10 PRIMARY HYPERTENSION: Primary | ICD-10-CM

## 2023-03-20 RX ORDER — LISINOPRIL 40 MG/1
40 TABLET ORAL DAILY
Qty: 90 TABLET | Refills: 3 | Status: SHIPPED | OUTPATIENT
Start: 2023-03-20

## 2023-03-20 NOTE — PROGRESS NOTES
Erich Emanuel is here for a 2 week BP check  Last visit his lisinopril was resumed. Had BMP Foster starting ACE   Latest Reference Range & Units 3/13/23 13:20   Sodium 136 - 145 mmol/L 139   Potassium 3.5 - 5.1 mmol/L 4.3   Chloride 99 - 110 mmol/L 103   CO2 21 - 32 mmol/L 24   BUN,BUNPL 7 - 20 mg/dL 16   Creatinine 0.8 - 1.3 mg/dL 0.8   Anion Gap 3 - 16  12   Est, Glom Filt Rate >60  >60       BP Readings from Last 3 Encounters:   03/20/23 138/78   03/06/23 (!) 148/94   12/05/22 (!) 154/82     Pulse Readings from Last 3 Encounters:   03/06/23 80   12/05/22 72   08/22/22 71     Wt Readings from Last 3 Encounters:   03/06/23 (!) 321 lb (145.6 kg)   12/05/22 (!) 322 lb 6.4 oz (146.2 kg)   08/22/22 (!) 310 lb (140.6 kg)           Assessment and Plan:   Erich Emanuel is to increase lisinopril to 40 mg once daily. Return to clinic in 2 weeks for BP check as well.

## 2023-04-06 ENCOUNTER — PROCEDURE VISIT (OUTPATIENT)
Dept: CARDIOLOGY CLINIC | Age: 73
End: 2023-04-06

## 2023-04-06 DIAGNOSIS — Z95.810 ICD (IMPLANTABLE CARDIOVERTER-DEFIBRILLATOR), BIVENTRICULAR, IN SITU: Primary | ICD-10-CM

## 2023-04-24 ENCOUNTER — NURSE ONLY (OUTPATIENT)
Dept: CARDIOLOGY CLINIC | Age: 73
End: 2023-04-24

## 2023-04-24 VITALS
DIASTOLIC BLOOD PRESSURE: 80 MMHG | BODY MASS INDEX: 45.1 KG/M2 | SYSTOLIC BLOOD PRESSURE: 138 MMHG | HEART RATE: 75 BPM | WEIGHT: 315 LBS | HEIGHT: 70 IN

## 2023-04-24 DIAGNOSIS — I10 PRIMARY HYPERTENSION: Primary | ICD-10-CM

## 2023-04-24 NOTE — PROGRESS NOTES
Aaron Earl is here for a 2 week BP check  Last visit his lisinopril was increased to 40 mg once a day     BP Readings from Last 3 Encounters:   03/20/23 138/78   03/06/23 (!) 148/94   12/05/22 (!) 154/82     Pulse Readings from Last 3 Encounters:   03/06/23 80   12/05/22 72   08/22/22 71     Wt Readings from Last 3 Encounters:   03/06/23 (!) 321 lb (145.6 kg)   12/05/22 (!) 322 lb 6.4 oz (146.2 kg)   08/22/22 (!) 310 lb (140.6 kg)           Assessment and Plan:   Aaron Earl is to cpm   Keep OV

## 2023-05-26 RX ORDER — DOFETILIDE 0.25 MG/1
CAPSULE ORAL
Qty: 60 CAPSULE | Refills: 3 | Status: SHIPPED | OUTPATIENT
Start: 2023-05-26

## 2023-05-31 ENCOUNTER — TELEPHONE (OUTPATIENT)
Dept: CARDIOLOGY CLINIC | Age: 73
End: 2023-05-31

## 2023-05-31 NOTE — TELEPHONE ENCOUNTER
Cardiologist: Dr. Rosy Verde  Surgeon: Dr. Alesha Giles  Surgery: Right total hip arthroplasty- posterior  Anesthesia: Spinal  Date: TBD  FAX#   [phone]  # [phone]    Last OV 3/6/2023 w/Lindsay    Cardiomyopathy  EF has improved and returned to normal.  Larry Bias suggest well compensated heart failure        Atrial fibrillation/atrial flutter  Denies symptoms from atrial fib or atrial flutter. ICD analysis reviewed no A-fib/flutter appreciated. He is on Tikosyn and being follow with electrophysiology. We will continue continue with anticoagulation for stroke prevention. BiV ICD  Stable readings: Stable lead and stable battery. No significant arrhythmias appreciated. Continue with every 3-month remote monitoring. Hypertension  Blood pressure is elevated on today's office visit. We will resume lisinopril 20 mg once daily. Plan to check a BMP and blood pressure in 2 weeks     Obstructive sleep apnea  Using cpap nighlty   Sleeping well. Obesity  BMI 46.06  Class III obesity. Weight loss is encouraged as morbidity and mortality are increased with morbid obesity           Last EKG- 12/5/2022      Echo- 2/17/2022  This is a limited study s/p ablation to r/o pericardial effusion. Left ventricular systolic function is normal.   Ejection fraction is visually estimated at 50-55%. No significant valvular abnormalities.    No evidence of any pericardial effusion      Cardiac defibrillator placement- 2/2014      Eliquis

## 2023-07-03 ENCOUNTER — PROCEDURE VISIT (OUTPATIENT)
Dept: CARDIOLOGY CLINIC | Age: 73
End: 2023-07-03

## 2023-07-03 DIAGNOSIS — Z95.810 ICD (IMPLANTABLE CARDIOVERTER-DEFIBRILLATOR), BIVENTRICULAR, IN SITU: Primary | ICD-10-CM

## 2023-08-22 ENCOUNTER — OFFICE VISIT (OUTPATIENT)
Dept: PULMONOLOGY | Age: 73
End: 2023-08-22
Payer: MEDICARE

## 2023-08-22 VITALS
OXYGEN SATURATION: 95 % | BODY MASS INDEX: 42.8 KG/M2 | SYSTOLIC BLOOD PRESSURE: 116 MMHG | HEIGHT: 70 IN | WEIGHT: 299 LBS | HEART RATE: 88 BPM | DIASTOLIC BLOOD PRESSURE: 68 MMHG

## 2023-08-22 DIAGNOSIS — R06.09 DYSPNEA ON EXERTION: ICD-10-CM

## 2023-08-22 DIAGNOSIS — J41.0 SIMPLE CHRONIC BRONCHITIS (HCC): ICD-10-CM

## 2023-08-22 DIAGNOSIS — G47.33 OBSTRUCTIVE SLEEP APNEA: Primary | ICD-10-CM

## 2023-08-22 PROCEDURE — 3078F DIAST BP <80 MM HG: CPT | Performed by: INTERNAL MEDICINE

## 2023-08-22 PROCEDURE — 1036F TOBACCO NON-USER: CPT | Performed by: INTERNAL MEDICINE

## 2023-08-22 PROCEDURE — 1124F ACP DISCUSS-NO DSCNMKR DOCD: CPT | Performed by: INTERNAL MEDICINE

## 2023-08-22 PROCEDURE — 3074F SYST BP LT 130 MM HG: CPT | Performed by: INTERNAL MEDICINE

## 2023-08-22 PROCEDURE — 3017F COLORECTAL CA SCREEN DOC REV: CPT | Performed by: INTERNAL MEDICINE

## 2023-08-22 PROCEDURE — 3023F SPIROM DOC REV: CPT | Performed by: INTERNAL MEDICINE

## 2023-08-22 PROCEDURE — G8427 DOCREV CUR MEDS BY ELIG CLIN: HCPCS | Performed by: INTERNAL MEDICINE

## 2023-08-22 PROCEDURE — 99213 OFFICE O/P EST LOW 20 MIN: CPT | Performed by: INTERNAL MEDICINE

## 2023-08-22 PROCEDURE — G8417 CALC BMI ABV UP PARAM F/U: HCPCS | Performed by: INTERNAL MEDICINE

## 2023-09-08 ENCOUNTER — OFFICE VISIT (OUTPATIENT)
Dept: CARDIOLOGY CLINIC | Age: 73
End: 2023-09-08

## 2023-09-08 VITALS
OXYGEN SATURATION: 96 % | SYSTOLIC BLOOD PRESSURE: 116 MMHG | BODY MASS INDEX: 43.09 KG/M2 | WEIGHT: 301 LBS | HEIGHT: 70 IN | DIASTOLIC BLOOD PRESSURE: 68 MMHG

## 2023-09-08 DIAGNOSIS — Z51.81 VISIT FOR MONITORING TIKOSYN THERAPY: Primary | ICD-10-CM

## 2023-09-08 DIAGNOSIS — Z98.890 S/P ABLATION OF ATRIAL FIBRILLATION: ICD-10-CM

## 2023-09-08 DIAGNOSIS — D68.69 HYPERCOAGULABLE STATE DUE TO PERSISTENT ATRIAL FIBRILLATION (HCC): ICD-10-CM

## 2023-09-08 DIAGNOSIS — I10 PRIMARY HYPERTENSION: ICD-10-CM

## 2023-09-08 DIAGNOSIS — Z86.79 S/P ABLATION OF ATRIAL FIBRILLATION: ICD-10-CM

## 2023-09-08 DIAGNOSIS — Z86.79 S/P ABLATION OF ATRIAL FLUTTER: ICD-10-CM

## 2023-09-08 DIAGNOSIS — I48.19 HYPERCOAGULABLE STATE DUE TO PERSISTENT ATRIAL FIBRILLATION (HCC): ICD-10-CM

## 2023-09-08 DIAGNOSIS — Z95.810 ICD (IMPLANTABLE CARDIOVERTER-DEFIBRILLATOR), BIVENTRICULAR, IN SITU: ICD-10-CM

## 2023-09-08 DIAGNOSIS — Z79.899 VISIT FOR MONITORING TIKOSYN THERAPY: Primary | ICD-10-CM

## 2023-09-08 DIAGNOSIS — Z98.890 S/P ABLATION OF ATRIAL FLUTTER: ICD-10-CM

## 2023-09-08 RX ORDER — DOFETILIDE 0.25 MG/1
CAPSULE ORAL
Qty: 60 CAPSULE | Refills: 3 | Status: SHIPPED | OUTPATIENT
Start: 2023-09-08

## 2023-09-08 NOTE — PROGRESS NOTES
flutter  3. Monitoring on Tikosyn therapy  4. Persistent atrial fibrillation status post cardioversion and now in sinus by V paced-devious ablation at [facility]  CAD  5. Severe left ventricular systolic dysfunction  6. Status post BiV ICD  7. Hypertension  8. Hypercoagulable states    EKG obtained patient noted to be in sinus rhythm. Patient is BiV paced  -this is within range to continue Tikosyn 250 mg twice daily  ICD interrogation- no afib noted    Patient denies issues with bleeding, we will continue Eliquis 5 mg twice daily    Vitals:    09/08/23 1306   BP: 116/68   SpO2: 96%   Blood pressure is stable continue lisinopril 40 mg daily and Coreg 25 mg twice daily. Patient had BMP in March. This was reviewed. Patient to follow-up in 9 months or sooner if needed    Device Assessment:     The device is [employer] BIVICD . Device interrogation was performed. Mode : DDD     Sensing is normal. Impedence is normal.  Threshold is normal.     There has not been interval changes. Estimated battery life is 6.5 years    Atrial Arrhythmia : <0.1%    Non sustained VT episodes : 1 episode lasting 1 second epsides    Sustained VT episodes : No      Patient activity reported by the device to be 2.8 hr/day     The underlying rhythm is AP,.  46.9 % atrial paced; 99.2 % ventricular paced. The patient is pacemaker dependent.       HF management parameter are with in normal limits

## 2023-09-27 RX ORDER — DOFETILIDE 0.25 MG/1
CAPSULE ORAL
Qty: 60 CAPSULE | Refills: 3 | OUTPATIENT
Start: 2023-09-27

## 2023-09-27 NOTE — TELEPHONE ENCOUNTER
Patient called and advised that Tikosyn was already called into [facility] on 9/8/23. Patient stated he didn't realize that and would call pharmacy. No other c/o noted at present.

## 2023-10-08 PROCEDURE — 93297 REM INTERROG DEV EVAL ICPMS: CPT | Performed by: INTERNAL MEDICINE

## 2023-10-08 PROCEDURE — 93295 DEV INTERROG REMOTE 1/2/MLT: CPT | Performed by: INTERNAL MEDICINE

## 2023-10-08 PROCEDURE — 93296 REM INTERROG EVL PM/IDS: CPT | Performed by: INTERNAL MEDICINE

## 2023-10-10 ENCOUNTER — PROCEDURE VISIT (OUTPATIENT)
Dept: CARDIOLOGY CLINIC | Age: 73
End: 2023-10-10
Payer: MEDICARE

## 2023-10-10 DIAGNOSIS — Z95.810 ICD (IMPLANTABLE CARDIOVERTER-DEFIBRILLATOR), BIVENTRICULAR, IN SITU: Primary | ICD-10-CM

## 2023-12-13 ENCOUNTER — OFFICE VISIT (OUTPATIENT)
Dept: CARDIOLOGY CLINIC | Age: 73
End: 2023-12-13
Payer: MEDICARE

## 2023-12-13 VITALS
SYSTOLIC BLOOD PRESSURE: 138 MMHG | HEIGHT: 70 IN | DIASTOLIC BLOOD PRESSURE: 76 MMHG | WEIGHT: 311 LBS | BODY MASS INDEX: 44.52 KG/M2 | HEART RATE: 66 BPM

## 2023-12-13 DIAGNOSIS — E78.2 MIXED HYPERLIPIDEMIA: ICD-10-CM

## 2023-12-13 DIAGNOSIS — Z95.810 ICD (IMPLANTABLE CARDIOVERTER-DEFIBRILLATOR), BIVENTRICULAR, IN SITU: ICD-10-CM

## 2023-12-13 DIAGNOSIS — I10 PRIMARY HYPERTENSION: Primary | ICD-10-CM

## 2023-12-13 PROCEDURE — 3075F SYST BP GE 130 - 139MM HG: CPT | Performed by: NURSE PRACTITIONER

## 2023-12-13 PROCEDURE — 1036F TOBACCO NON-USER: CPT | Performed by: NURSE PRACTITIONER

## 2023-12-13 PROCEDURE — 93000 ELECTROCARDIOGRAM COMPLETE: CPT | Performed by: NURSE PRACTITIONER

## 2023-12-13 PROCEDURE — G8417 CALC BMI ABV UP PARAM F/U: HCPCS | Performed by: NURSE PRACTITIONER

## 2023-12-13 PROCEDURE — G8484 FLU IMMUNIZE NO ADMIN: HCPCS | Performed by: NURSE PRACTITIONER

## 2023-12-13 PROCEDURE — 3017F COLORECTAL CA SCREEN DOC REV: CPT | Performed by: NURSE PRACTITIONER

## 2023-12-13 PROCEDURE — 99214 OFFICE O/P EST MOD 30 MIN: CPT | Performed by: NURSE PRACTITIONER

## 2023-12-13 PROCEDURE — G8427 DOCREV CUR MEDS BY ELIG CLIN: HCPCS | Performed by: NURSE PRACTITIONER

## 2023-12-13 PROCEDURE — 3078F DIAST BP <80 MM HG: CPT | Performed by: NURSE PRACTITIONER

## 2023-12-13 PROCEDURE — 1124F ACP DISCUSS-NO DSCNMKR DOCD: CPT | Performed by: NURSE PRACTITIONER

## 2023-12-13 ASSESSMENT — ENCOUNTER SYMPTOMS
ORTHOPNEA: 0
SHORTNESS OF BREATH: 1

## 2024-01-17 ENCOUNTER — PROCEDURE VISIT (OUTPATIENT)
Dept: CARDIOLOGY CLINIC | Age: 74
End: 2024-01-17

## 2024-01-17 ENCOUNTER — TELEPHONE (OUTPATIENT)
Dept: CARDIOLOGY CLINIC | Age: 74
End: 2024-01-17

## 2024-01-17 DIAGNOSIS — Z95.810 ICD (IMPLANTABLE CARDIOVERTER-DEFIBRILLATOR), BIVENTRICULAR, IN SITU: Primary | ICD-10-CM

## 2024-01-24 RX ORDER — DOFETILIDE 0.25 MG/1
CAPSULE ORAL
Qty: 180 CAPSULE | Refills: 1 | Status: SHIPPED | OUTPATIENT
Start: 2024-01-24

## 2024-03-27 RX ORDER — LISINOPRIL 40 MG/1
40 TABLET ORAL DAILY
Qty: 90 TABLET | Refills: 3 | Status: SHIPPED | OUTPATIENT
Start: 2024-03-27

## 2024-04-11 ENCOUNTER — TELEPHONE (OUTPATIENT)
Dept: CARDIOLOGY CLINIC | Age: 74
End: 2024-04-11

## 2024-04-11 NOTE — TELEPHONE ENCOUNTER
Pt called, DOS 1/14/2024 was filed to [facility] - he has [employer] eff. 1/1/2024.  The new [employer] was entered in- we will need to get a copy of the card at next visit. I emailed billing to correct the filing.

## 2024-04-25 ENCOUNTER — PROCEDURE VISIT (OUTPATIENT)
Dept: CARDIOLOGY CLINIC | Age: 74
End: 2024-04-25

## 2024-04-25 DIAGNOSIS — Z95.810 ICD (IMPLANTABLE CARDIOVERTER-DEFIBRILLATOR), BIVENTRICULAR, IN SITU: Primary | ICD-10-CM

## 2024-05-06 RX ORDER — APIXABAN 5 MG/1
5 TABLET, FILM COATED ORAL 2 TIMES DAILY
Qty: 60 TABLET | Refills: 5 | Status: SHIPPED | OUTPATIENT
Start: 2024-05-06

## 2024-05-24 ENCOUNTER — PROCEDURE VISIT (OUTPATIENT)
Dept: CARDIOLOGY CLINIC | Age: 74
End: 2024-05-24

## 2024-05-24 ENCOUNTER — TELEPHONE (OUTPATIENT)
Dept: CARDIOLOGY CLINIC | Age: 74
End: 2024-05-24

## 2024-05-24 DIAGNOSIS — Z95.810 ICD (IMPLANTABLE CARDIOVERTER-DEFIBRILLATOR), BIVENTRICULAR, IN SITU: Primary | ICD-10-CM

## 2024-05-24 NOTE — TELEPHONE ENCOUNTER
Patient called this morning because his device was alarming last night and this morning. I looked in [employer] and had a report showing lead warning. I had Gianluca from [employer] look at the report and he said that it may be a lead fracture. Patient scheduled today to come into the office for device check with Gianluca.

## 2024-06-12 ENCOUNTER — OFFICE VISIT (OUTPATIENT)
Dept: CARDIOLOGY CLINIC | Age: 74
End: 2024-06-12
Payer: MEDICARE

## 2024-06-12 VITALS
WEIGHT: 315 LBS | SYSTOLIC BLOOD PRESSURE: 138 MMHG | HEIGHT: 70 IN | OXYGEN SATURATION: 95 % | DIASTOLIC BLOOD PRESSURE: 86 MMHG | HEART RATE: 69 BPM | BODY MASS INDEX: 45.1 KG/M2

## 2024-06-12 DIAGNOSIS — Z98.890 S/P ABLATION OF ATRIAL FIBRILLATION: ICD-10-CM

## 2024-06-12 DIAGNOSIS — Z79.899 VISIT FOR MONITORING TIKOSYN THERAPY: ICD-10-CM

## 2024-06-12 DIAGNOSIS — I10 PRIMARY HYPERTENSION: ICD-10-CM

## 2024-06-12 DIAGNOSIS — Z98.890 S/P ABLATION OF ATRIAL FLUTTER: Primary | ICD-10-CM

## 2024-06-12 DIAGNOSIS — I48.19 HYPERCOAGULABLE STATE DUE TO PERSISTENT ATRIAL FIBRILLATION (HCC): ICD-10-CM

## 2024-06-12 DIAGNOSIS — D68.69 HYPERCOAGULABLE STATE DUE TO PERSISTENT ATRIAL FIBRILLATION (HCC): ICD-10-CM

## 2024-06-12 DIAGNOSIS — Z51.81 VISIT FOR MONITORING TIKOSYN THERAPY: ICD-10-CM

## 2024-06-12 DIAGNOSIS — Z86.79 S/P ABLATION OF ATRIAL FIBRILLATION: ICD-10-CM

## 2024-06-12 DIAGNOSIS — Z86.79 S/P ABLATION OF ATRIAL FLUTTER: Primary | ICD-10-CM

## 2024-06-12 PROCEDURE — 3017F COLORECTAL CA SCREEN DOC REV: CPT | Performed by: NURSE PRACTITIONER

## 2024-06-12 PROCEDURE — 3075F SYST BP GE 130 - 139MM HG: CPT | Performed by: NURSE PRACTITIONER

## 2024-06-12 PROCEDURE — 1036F TOBACCO NON-USER: CPT | Performed by: NURSE PRACTITIONER

## 2024-06-12 PROCEDURE — G8427 DOCREV CUR MEDS BY ELIG CLIN: HCPCS | Performed by: NURSE PRACTITIONER

## 2024-06-12 PROCEDURE — 99214 OFFICE O/P EST MOD 30 MIN: CPT | Performed by: NURSE PRACTITIONER

## 2024-06-12 PROCEDURE — 93000 ELECTROCARDIOGRAM COMPLETE: CPT | Performed by: NURSE PRACTITIONER

## 2024-06-12 PROCEDURE — 1124F ACP DISCUSS-NO DSCNMKR DOCD: CPT | Performed by: NURSE PRACTITIONER

## 2024-06-12 PROCEDURE — G8417 CALC BMI ABV UP PARAM F/U: HCPCS | Performed by: NURSE PRACTITIONER

## 2024-06-12 PROCEDURE — 3079F DIAST BP 80-89 MM HG: CPT | Performed by: NURSE PRACTITIONER

## 2024-06-12 RX ORDER — AMLODIPINE BESYLATE 2.5 MG/1
2.5 TABLET ORAL DAILY
COMMUNITY

## 2024-06-12 NOTE — PROGRESS NOTES
and agitation.     Physical Examination:    /86 (Site: Left Upper Arm, Position: Sitting, Cuff Size: Medium Adult)   Pulse 69   Ht 1.778 m (5' 10\")   Wt (!) 147.4 kg (325 lb)   SpO2 95%   BMI 46.63 kg/m²    Wt Readings from Last 3 Encounters:   06/12/24 (!) 147.4 kg (325 lb)   12/13/23 (!) 141.1 kg (311 lb)   09/08/23 (!) 136.5 kg (301 lb)     Body mass index is 46.63 kg/m².      Physical Exam   Constitutional: He is oriented to person, place, and time and well-developed, well-nourished, and in no distress.   HENT:   Head: Normocephalic and atraumatic.   Eyes: Conjunctivae are normal. Pupils are equal, round, and reactive to light. Right eye exhibits no discharge.   Neck: Normal range of motion. No JVD present. No thyromegaly present.   Cardiovascular: Regular rate and rhythm.  Exam reveals no friction rub.    Murmur heard.  Pulmonary/Chest: Effort normal and breath sounds normal. No stridor. No respiratory distress. He has no wheezes.   Abdominal: Soft. Bowel sounds are normal. He exhibits no distension. There is no tenderness.   Musculoskeletal: Normal range of motion. He exhibits no edema or tenderness.   Neurological: He is alert and oriented to person, place, and time. No cranial nerve deficit.   Skin: Skin is warm and dry. No rash noted. No erythema.   Psychiatric: Mood and affect normal.           CBC:   Lab Results   Component Value Date/Time    WBC 7.4 01/31/2022 02:32 PM    HGB 14.9 02/17/2022 09:00 AM    HCT 44.0 02/17/2022 09:00 AM     01/31/2022 02:32 PM     Lipids:   Lab Results   Component Value Date    CHOL 134 02/05/2014    TRIG 104 02/05/2014    HDL 31 (L) 02/05/2014    LDLDIRECT 93 02/05/2014     PT/INR:   Lab Results   Component Value Date/Time    INR 1.08 01/31/2022 02:32 PM        BMP:    Lab Results   Component Value Date     03/13/2023    K 4.3 03/13/2023     03/13/2023    CO2 24 03/13/2023    BUN 16 03/13/2023     CMP:   Lab Results   Component Value Date    AST

## 2024-07-08 RX ORDER — DOFETILIDE 0.25 MG/1
CAPSULE ORAL
Qty: 180 CAPSULE | Refills: 1 | Status: SHIPPED | OUTPATIENT
Start: 2024-07-08

## 2024-07-23 RX ORDER — DOFETILIDE 0.25 MG/1
CAPSULE ORAL
Qty: 180 CAPSULE | Refills: 1 | OUTPATIENT
Start: 2024-07-23

## 2024-07-31 ENCOUNTER — PROCEDURE VISIT (OUTPATIENT)
Dept: CARDIOLOGY CLINIC | Age: 74
End: 2024-07-31

## 2024-07-31 DIAGNOSIS — Z95.810 ICD (IMPLANTABLE CARDIOVERTER-DEFIBRILLATOR), BIVENTRICULAR, IN SITU: Primary | ICD-10-CM

## 2024-08-21 ENCOUNTER — OFFICE VISIT (OUTPATIENT)
Dept: PULMONOLOGY | Age: 74
End: 2024-08-21
Payer: MEDICARE

## 2024-08-21 VITALS
HEIGHT: 70 IN | BODY MASS INDEX: 45.1 KG/M2 | SYSTOLIC BLOOD PRESSURE: 146 MMHG | WEIGHT: 315 LBS | HEART RATE: 80 BPM | DIASTOLIC BLOOD PRESSURE: 84 MMHG | OXYGEN SATURATION: 97 %

## 2024-08-21 DIAGNOSIS — J41.0 SIMPLE CHRONIC BRONCHITIS (HCC): ICD-10-CM

## 2024-08-21 DIAGNOSIS — G47.33 OBSTRUCTIVE SLEEP APNEA: Primary | ICD-10-CM

## 2024-08-21 DIAGNOSIS — R06.09 DYSPNEA ON EXERTION: ICD-10-CM

## 2024-08-21 DIAGNOSIS — E66.01 CLASS 3 SEVERE OBESITY IN ADULT, UNSPECIFIED BMI, UNSPECIFIED OBESITY TYPE, UNSPECIFIED WHETHER SERIOUS COMORBIDITY PRESENT (HCC): ICD-10-CM

## 2024-08-21 PROCEDURE — 3023F SPIROM DOC REV: CPT | Performed by: INTERNAL MEDICINE

## 2024-08-21 PROCEDURE — 3017F COLORECTAL CA SCREEN DOC REV: CPT | Performed by: INTERNAL MEDICINE

## 2024-08-21 PROCEDURE — 3079F DIAST BP 80-89 MM HG: CPT | Performed by: INTERNAL MEDICINE

## 2024-08-21 PROCEDURE — G8427 DOCREV CUR MEDS BY ELIG CLIN: HCPCS | Performed by: INTERNAL MEDICINE

## 2024-08-21 PROCEDURE — 99213 OFFICE O/P EST LOW 20 MIN: CPT | Performed by: INTERNAL MEDICINE

## 2024-08-21 PROCEDURE — 1124F ACP DISCUSS-NO DSCNMKR DOCD: CPT | Performed by: INTERNAL MEDICINE

## 2024-08-21 PROCEDURE — G8417 CALC BMI ABV UP PARAM F/U: HCPCS | Performed by: INTERNAL MEDICINE

## 2024-08-21 PROCEDURE — 1036F TOBACCO NON-USER: CPT | Performed by: INTERNAL MEDICINE

## 2024-08-21 PROCEDURE — 3077F SYST BP >= 140 MM HG: CPT | Performed by: INTERNAL MEDICINE

## 2024-08-21 NOTE — PROGRESS NOTES
SUBJECTIVE:  Chief Complaint: Obstructive sleep apnea on CPAP, simple chronic bronchitis, mild dyspnea on exertion, obesity  Mr. Mcneil states that he continues to wear CPAP nightly.  He typically uses it 6 to 8 hours per night and gets an excellent clinical benefit with good daytime energy and minimal daytime sleepiness.  He does get new CPAP equipment and supplies including face mask every 3 to 6 months.  He has had a slight rash on his face from his mask in the past.  He has had no recent episodes of bronchitis and denies worsening shortness of breath or chest discomfort.  He continues to struggle with weight loss.      ROS:  Constitution:  HEENT: Negative for ear, throat pain  Cardiovascular: Negative for chest pain, syncope, edema  Pulmonary: See HPI  Musculoskeletal: Negative for DVT, myalgias, arthralgias    OBJECTIVE:  BP (!) 146/84   Pulse 80   Ht 1.778 m (5' 10\")   Wt (!) 147.9 kg (326 lb)   SpO2 97%   BMI 46.78 kg/m²      Physical Exam:  Constitutional:  He appears well developed and well-nourished.  Moderately obese.  Not in respiratory distress at rest  Neck:  Supple, No palpable lymphadenopathy, No JVD, increased neck girth  Cardiovascular:  S1, S2 Normal, Regular rhythm, no murmurs or gallops, No pericardial  rubs.  Pulmonary: Breath sounds are clear throughout all areas without wheezing or rhonchi  Abdomen: Not examined  Extremities: no edema, No DVT  Neurologic: Oriented x3, No focal deficits    Radiology: Chest x-ray on 12/30/2021 showed stable cardiomegaly with pulmonary vas congestion  PFT: Office spirometry 12/11/2018 demonstrated no significant airways obstruction with a borderline response to bronchodilators      Echocardiogram: Limited echo on 2/17/2022 showed normal left ventricular systolic function with no mention of RVSP    ASSESSMENT:    1. Obstructive sleep apnea    2. Simple chronic bronchitis (HCC)    3. Dyspnea on exertion    4. Class 3 severe obesity in adult, unspecified BMI,

## 2024-09-16 ENCOUNTER — OFFICE VISIT (OUTPATIENT)
Dept: CARDIOLOGY CLINIC | Age: 74
End: 2024-09-16
Payer: MEDICARE

## 2024-09-16 VITALS
BODY MASS INDEX: 45.1 KG/M2 | WEIGHT: 315 LBS | DIASTOLIC BLOOD PRESSURE: 82 MMHG | SYSTOLIC BLOOD PRESSURE: 146 MMHG | HEART RATE: 76 BPM | HEIGHT: 70 IN

## 2024-09-16 DIAGNOSIS — R06.02 SHORTNESS OF BREATH: Primary | ICD-10-CM

## 2024-09-16 PROCEDURE — 1124F ACP DISCUSS-NO DSCNMKR DOCD: CPT | Performed by: INTERNAL MEDICINE

## 2024-09-16 PROCEDURE — 3077F SYST BP >= 140 MM HG: CPT | Performed by: INTERNAL MEDICINE

## 2024-09-16 PROCEDURE — 3017F COLORECTAL CA SCREEN DOC REV: CPT | Performed by: INTERNAL MEDICINE

## 2024-09-16 PROCEDURE — 3079F DIAST BP 80-89 MM HG: CPT | Performed by: INTERNAL MEDICINE

## 2024-09-16 PROCEDURE — 99214 OFFICE O/P EST MOD 30 MIN: CPT | Performed by: INTERNAL MEDICINE

## 2024-09-16 PROCEDURE — G8427 DOCREV CUR MEDS BY ELIG CLIN: HCPCS | Performed by: INTERNAL MEDICINE

## 2024-09-16 PROCEDURE — 1036F TOBACCO NON-USER: CPT | Performed by: INTERNAL MEDICINE

## 2024-09-16 PROCEDURE — G8417 CALC BMI ABV UP PARAM F/U: HCPCS | Performed by: INTERNAL MEDICINE

## 2024-10-08 ENCOUNTER — TELEPHONE (OUTPATIENT)
Dept: CARDIOLOGY CLINIC | Age: 74
End: 2024-10-08

## 2024-10-08 NOTE — TELEPHONE ENCOUNTER
Next OV is 12/13/2024. Abnormal results present, pt advised about OV follow up as above.      Pt stated he would go to OV.                        Echo (TTE) complete       Left Ventricle: Reduced left ventricular systolic function with a visually estimated EF of 40 - 45%. Left ventricle size is normal. Mildly increased wall thickness. Global hypokinesis present. Normal diastolic function.    No significant valvular disease noted.    Right Ventricle:ICD wire noted in the right side of the heart.    Tricuspid Valve: Mild regurgitation. The estimated RVSP is 35 mmHg.    Left Atrium: Left atrium is moderately dilated.    Pericardium: No pericardial effusion.    Image quality is fair. Technically difficult study due to patient's body habitus.

## 2024-11-12 PROCEDURE — 93295 DEV INTERROG REMOTE 1/2/MLT: CPT | Performed by: INTERNAL MEDICINE

## 2024-11-12 PROCEDURE — 93296 REM INTERROG EVL PM/IDS: CPT | Performed by: INTERNAL MEDICINE

## 2024-12-13 ENCOUNTER — OFFICE VISIT (OUTPATIENT)
Dept: CARDIOLOGY CLINIC | Age: 74
End: 2024-12-13

## 2024-12-13 VITALS
DIASTOLIC BLOOD PRESSURE: 78 MMHG | HEIGHT: 70 IN | BODY MASS INDEX: 45.1 KG/M2 | SYSTOLIC BLOOD PRESSURE: 136 MMHG | HEART RATE: 78 BPM | WEIGHT: 315 LBS

## 2024-12-13 DIAGNOSIS — Z79.899 VISIT FOR MONITORING TIKOSYN THERAPY: ICD-10-CM

## 2024-12-13 DIAGNOSIS — Z98.890 S/P ABLATION OF ATRIAL FLUTTER: ICD-10-CM

## 2024-12-13 DIAGNOSIS — I10 PRIMARY HYPERTENSION: ICD-10-CM

## 2024-12-13 DIAGNOSIS — R06.02 SHORTNESS OF BREATH: ICD-10-CM

## 2024-12-13 DIAGNOSIS — Z68.56 SEVERE OBESITY DUE TO EXCESS CALORIES WITHOUT SERIOUS COMORBIDITY WITH BODY MASS INDEX (BMI) GREATER THAN OR EQUAL TO 140% OF 95TH PERCENTILE FOR AGE IN PEDIATRIC PATIENT: ICD-10-CM

## 2024-12-13 DIAGNOSIS — Z98.890 S/P ABLATION OF ATRIAL FIBRILLATION: Primary | ICD-10-CM

## 2024-12-13 DIAGNOSIS — E66.01 SEVERE OBESITY DUE TO EXCESS CALORIES WITHOUT SERIOUS COMORBIDITY WITH BODY MASS INDEX (BMI) GREATER THAN OR EQUAL TO 140% OF 95TH PERCENTILE FOR AGE IN PEDIATRIC PATIENT: ICD-10-CM

## 2024-12-13 DIAGNOSIS — Z86.79 S/P ABLATION OF ATRIAL FLUTTER: ICD-10-CM

## 2024-12-13 DIAGNOSIS — Z51.81 VISIT FOR MONITORING TIKOSYN THERAPY: ICD-10-CM

## 2024-12-13 DIAGNOSIS — Z86.79 S/P ABLATION OF ATRIAL FIBRILLATION: Primary | ICD-10-CM

## 2024-12-13 RX ORDER — AMLODIPINE BESYLATE 5 MG/1
5 TABLET ORAL DAILY
Qty: 90 TABLET | Refills: 1 | Status: SHIPPED | OUTPATIENT
Start: 2024-12-13

## 2024-12-13 NOTE — PROGRESS NOTES
Electrophysiology follow up Note      Chief complaint: here for follow up on atrial flutter ablation and Tikosyn monitoring therapy    Referring physician:        Primary care physician: Jama Richards MD      History of Present Illness:     THis visit 12/13/2024  Patient is here to today for follow-up on Tikosyn monitoring therapy, atrial flutter and atrial fibrillation.  Patient reports that he is feeling well.  He states he does have some shortness of breath with exertion that will improve with rest.  He also states that he is concerned about his weight gain.  He recently had an echo and was told that he needed to speak with cardiology about the results.  He denies chest pain, palpitations, lightheadedness, dizziness, edema or syncope.    Previous visit 6/12/2024  Patient is here today for follow-up on Tikosyn monitoring therapy, atrial flutter and atrial fibrillation.  Patient reports he is feeling well.  He denies chest pain, palpitations, shortness of breath, lightheadedness, dizziness, edema or syncope    PRevious visit 9/8/2026  Patient is here today for follow up on tikosyn and atrial fibrillation.   He recently had hip replacement surgery. He is recovering well.   He denies chest pain, palpitations, shortness of breath, lightheadedness, dizziness, edema or syncope. He is taking his medications as prescribed.     Previous visit 12/5/2022  Patient here today for follow-up on ablation of atrial flutter and atrial fibrillation.  Patient reports he feels well.  He denies chest pain, palpitations, shortness of breath, lightheadedness, dizziness, edema or syncope.  He denies issues with bleeding.  He is taking his medications as prescribed.    Previous visit 6/22/2022  Patient here today for follow-up on ablation of atrial flutter.  Patient reports he has been feeling well.  He denies chest pain, palpitations, shortness of breath, lightheadedness,

## 2024-12-13 NOTE — PATIENT INSTRUCTIONS
Please be informed that if you contact our office outside of normal business hours the physician on call cannot help with any scheduling or rescheduling issues, procedure instruction questions or any type of medication issue.    We advise you for any urgent/emergency that you go to the nearest emergency room!    PLEASE CALL OUR OFFICE DURING NORMAL BUSINESS HOURS    Monday - Friday   8 am to 5 pm    [place]: [phone]    [place]: [phone]    [place]:  [phone]    **It is YOUR responsibilty to bring medication bottles and/or updated medication list to EACH APPOINTMENT. This will allow us to better serve you and all your healthcare needs**    Thank you for allowing us to care for you today!   We want to ensure we can follow your treatment plan and we strive to give you the best outcomes and experience possible.   If you ever have a life threatening emergency and call 911 - for an ambulance (EMS)   Our providers can only care for you at:   [facility] or [facility].   Even if you have someone take you or you drive yourself we can only care for you in a [facility] facility. Our providers are not setup at the other healthcare locations!

## 2024-12-16 ENCOUNTER — TELEPHONE (OUTPATIENT)
Dept: BARIATRICS/WEIGHT MGMT | Age: 74
End: 2024-12-16

## 2025-01-02 RX ORDER — DOFETILIDE 0.25 MG/1
CAPSULE ORAL
Qty: 180 CAPSULE | Refills: 2 | Status: SHIPPED | OUTPATIENT
Start: 2025-01-02

## 2025-01-13 ENCOUNTER — OFFICE VISIT (OUTPATIENT)
Dept: BARIATRICS/WEIGHT MGMT | Age: 75
End: 2025-01-13
Payer: MEDICARE

## 2025-01-13 VITALS
BODY MASS INDEX: 46.65 KG/M2 | WEIGHT: 315 LBS | SYSTOLIC BLOOD PRESSURE: 128 MMHG | HEART RATE: 81 BPM | OXYGEN SATURATION: 96 % | DIASTOLIC BLOOD PRESSURE: 88 MMHG | HEIGHT: 69 IN

## 2025-01-13 DIAGNOSIS — Z79.899 MEDICATION MANAGEMENT: ICD-10-CM

## 2025-01-13 DIAGNOSIS — E66.01 MORBID OBESITY WITH BMI OF 45.0-49.9, ADULT: Primary | ICD-10-CM

## 2025-01-13 PROCEDURE — 3017F COLORECTAL CA SCREEN DOC REV: CPT | Performed by: NURSE PRACTITIONER

## 2025-01-13 PROCEDURE — 1160F RVW MEDS BY RX/DR IN RCRD: CPT | Performed by: NURSE PRACTITIONER

## 2025-01-13 PROCEDURE — G8417 CALC BMI ABV UP PARAM F/U: HCPCS | Performed by: NURSE PRACTITIONER

## 2025-01-13 PROCEDURE — 1124F ACP DISCUSS-NO DSCNMKR DOCD: CPT | Performed by: NURSE PRACTITIONER

## 2025-01-13 PROCEDURE — 1036F TOBACCO NON-USER: CPT | Performed by: NURSE PRACTITIONER

## 2025-01-13 PROCEDURE — 3079F DIAST BP 80-89 MM HG: CPT | Performed by: NURSE PRACTITIONER

## 2025-01-13 PROCEDURE — 99213 OFFICE O/P EST LOW 20 MIN: CPT | Performed by: NURSE PRACTITIONER

## 2025-01-13 PROCEDURE — 3074F SYST BP LT 130 MM HG: CPT | Performed by: NURSE PRACTITIONER

## 2025-01-13 PROCEDURE — 1159F MED LIST DOCD IN RCRD: CPT | Performed by: NURSE PRACTITIONER

## 2025-01-13 PROCEDURE — G8427 DOCREV CUR MEDS BY ELIG CLIN: HCPCS | Performed by: NURSE PRACTITIONER

## 2025-01-13 NOTE — PROGRESS NOTES
Chief Complaint   Patient presents with    Weight Management     NP - 1st Med  Visit         SUBJECTIVE:    HPI: Patient is here with complaints of weight management.    Obesity with a BMI of Body mass index is 48.16 kg/m²..    Patient has failed to lose 5% of body weight for many years.    Any history of type 2 diabetes mellitus? Denies   If yes, any current GLP-1 receptor agonists or sulfonylureas? (must consider reducing dose of insulin or sulfonylureas by 1/2 to reduce risk of hypoglycemia).    Any current renal impairment? Denies    Any current hepatic impairment? Denies    Any history of pancreatitis? Denies    Any history of gastroparesis? Denies   (Saxenda has not been studied in patients with pre-existing gastroparesis).    Any history of personal or family medullary thyroid cancinoma (MTC) or multiple endocrine neoplasia type 2 (MEN 2)? Denies (black box warning).    Any current or history suicidal attempts or active suicidal ideation? Denies   (avoid in these patients)      I have reviewed the patient's(pertinent information to this visit) medical history, family history(scanned in  the [employer] under \"patient questioner\"), social history and review of systems with the patient today in the office.          Past Surgical History:   Procedure Laterality Date    ABLATION OF DYSRHYTHMIC FOCUS  02/17/2022    atrial flutter ablation    ATRIAL ABLATION SURGERY  02/21/2014    for a-fib    CARDIAC DEFIBRILLATOR PLACEMENT  02/19/2014    Medtronic Viva S CRT-D Defibrillator Implanted    CARDIAC DEFIBRILLATOR PLACEMENT Left 10/21/2021    BIVICD Generator change - Medtronic Claria MRI CRT-D SureScan    CARDIOVERSION  05/18/2015    KVNG/Cardioversion    COLONOSCOPY  Early 2000's    DENTAL SURGERY      Teeth Extracted In Past    HERNIA REPAIR Right 11/10/2017    inguinal hernia repair with mesh/ robotic    KNEE ARTHROSCOPY Left 1999    TOTAL KNEE ARTHROPLASTY Left 08/2019    WISDOM TOOTH EXTRACTION      4 [place] Teeth

## 2025-01-22 ENCOUNTER — OFFICE VISIT (OUTPATIENT)
Dept: BARIATRICS/WEIGHT MGMT | Age: 75
End: 2025-01-22

## 2025-01-22 VITALS — WEIGHT: 315 LBS | BODY MASS INDEX: 46.65 KG/M2 | HEIGHT: 69 IN

## 2025-01-22 DIAGNOSIS — E66.01 MORBID OBESITY WITH BMI OF 45.0-49.9, ADULT: Primary | ICD-10-CM

## 2025-02-26 ENCOUNTER — OFFICE VISIT (OUTPATIENT)
Dept: BARIATRICS/WEIGHT MGMT | Age: 75
End: 2025-02-26

## 2025-02-26 VITALS — BODY MASS INDEX: 46.65 KG/M2 | WEIGHT: 315 LBS | HEIGHT: 69 IN

## 2025-02-26 DIAGNOSIS — E66.01 MORBID OBESITY WITH BMI OF 45.0-49.9, ADULT: Primary | ICD-10-CM

## 2025-02-26 NOTE — PROGRESS NOTES
Outpatient Nutrition Counseling - Non-Surgical Weight Loss Program    REASON FOR VISIT: Weigh In    Chief Complaint:    Chief Complaint   Patient presents with    Weight Management       SUBJECTIVE:  The patient is a 74 y.o. male being seen for obesity, enrolled in Non-Surgical Weight Loss Program; Napoleon's, Height: 175.9 cm (5' 9.25\"), Weight - Scale: (!) 146.9 kg (323 lb 12.8 oz), Current Body mass index is 47.47 kg/m².  patient's PCP is Jama Richards MD     Comorbid Conditions:  Significant diseases affecting this patient are   Past Medical History:   Diagnosis Date    Atrial fibrillation (HCC)     Cardiac pacemaker 2/19/2014    BIV ICD Serial # BL [id number] Moderl # [id number]    Cardiomyopathy (HCC)     COPD (chronic obstructive pulmonary disease) (HCC)     Sees Dr. Dobbs    Hematoma - postoperative 02/19/2014    Pseudo aneurism post op at [facility] leftt groin    History of blood transfusion 2014    No Reaction To Blood Transfusion Received    History of cardiac cath 12/4/2013 12/13 EF30% mild LAD, CX     History of cardiovascular disorder 12/4/2013    EF 29%, mild ischemia RCA    History of cardioversion 12/6/2013 12/13 unsuccessful    History of cardioversion 5/18/15    DCCV-.     History of echocardiogram 04/01/2019    History of transesophageal echocardiography (KVNG) for monitoring 12/4/2013 12/13 no clots    Hyperlipidemia     Hypertelorism     Hypertension     Obesity     S/P ablation of atrial fibrillation 02/14/2014    for a-fib by dr. Rios    S/P cardiac cath 2/5/14    Shortness of breath on exertion     Sleep apnea     Uses CPAP    Teeth missing     Upper And Lower    Wears glasses     [place] teeth extracted     4 [place] Teeth Extracted In Past   .    Review of Systems - Review of Systems  Otherwise per HPI.    Allergies:  No Known Allergies    Past Surgical History:  Past Surgical History:   Procedure Laterality Date    ABLATION OF DYSRHYTHMIC FOCUS  02/17/2022    atrial flutter

## 2025-03-19 ENCOUNTER — OFFICE VISIT (OUTPATIENT)
Dept: CARDIOLOGY CLINIC | Age: 75
End: 2025-03-19
Payer: MEDICARE

## 2025-03-19 VITALS
HEART RATE: 69 BPM | HEIGHT: 70 IN | SYSTOLIC BLOOD PRESSURE: 124 MMHG | DIASTOLIC BLOOD PRESSURE: 82 MMHG | BODY MASS INDEX: 45.1 KG/M2 | WEIGHT: 315 LBS

## 2025-03-19 DIAGNOSIS — I10 PRIMARY HYPERTENSION: Primary | ICD-10-CM

## 2025-03-19 DIAGNOSIS — I48.92 ATRIAL FLUTTER, UNSPECIFIED TYPE (HCC): ICD-10-CM

## 2025-03-19 DIAGNOSIS — Z95.810 ICD (IMPLANTABLE CARDIOVERTER-DEFIBRILLATOR), BIVENTRICULAR, IN SITU: ICD-10-CM

## 2025-03-19 DIAGNOSIS — R06.09 DYSPNEA ON EXERTION: ICD-10-CM

## 2025-03-19 DIAGNOSIS — R06.02 SHORTNESS OF BREATH: ICD-10-CM

## 2025-03-19 PROCEDURE — 1159F MED LIST DOCD IN RCRD: CPT | Performed by: NURSE PRACTITIONER

## 2025-03-19 PROCEDURE — 93000 ELECTROCARDIOGRAM COMPLETE: CPT | Performed by: NURSE PRACTITIONER

## 2025-03-19 PROCEDURE — G8417 CALC BMI ABV UP PARAM F/U: HCPCS | Performed by: NURSE PRACTITIONER

## 2025-03-19 PROCEDURE — 3079F DIAST BP 80-89 MM HG: CPT | Performed by: NURSE PRACTITIONER

## 2025-03-19 PROCEDURE — 3074F SYST BP LT 130 MM HG: CPT | Performed by: NURSE PRACTITIONER

## 2025-03-19 PROCEDURE — 99214 OFFICE O/P EST MOD 30 MIN: CPT | Performed by: NURSE PRACTITIONER

## 2025-03-19 PROCEDURE — G8427 DOCREV CUR MEDS BY ELIG CLIN: HCPCS | Performed by: NURSE PRACTITIONER

## 2025-03-19 PROCEDURE — 3017F COLORECTAL CA SCREEN DOC REV: CPT | Performed by: NURSE PRACTITIONER

## 2025-03-19 PROCEDURE — 1036F TOBACCO NON-USER: CPT | Performed by: NURSE PRACTITIONER

## 2025-03-19 PROCEDURE — 1124F ACP DISCUSS-NO DSCNMKR DOCD: CPT | Performed by: NURSE PRACTITIONER

## 2025-03-19 ASSESSMENT — ENCOUNTER SYMPTOMS
ORTHOPNEA: 0
SHORTNESS OF BREATH: 1

## 2025-03-19 NOTE — PROGRESS NOTES
3/19/2025  Primary cardiologist: Dr. Hutchison    CC:   Napoleon  is an established 74 y.o.  male here for a follow up on cariomyopathy      SUBJECTIVE/OBJECTIVE:  Napoleon is a 74 y.o. male with a history of atrial flutter s/p ablation of left atrial roof line, ablation of mitral valve annulus flutter, Tikosyn therapy, persistent atrial fibrillation with history of cardioversion, dilated cardiomyopathy with severe left ventricular systolic dysfunction, s/p BiV ICD, hypertension and obstructive sleep apnea     HPI:  Napoleon states he has some shortness of breath.  Has noticed things, little worse since his last visit.  Notes that walking from the parking lot into the office he now has a little shortness of breath.  No orthopnea or PND.    Review of Systems   Constitutional: Negative for diaphoresis and malaise/fatigue.   Cardiovascular:  Positive for dyspnea on exertion. Negative for chest pain, claudication, irregular heartbeat, leg swelling, near-syncope, orthopnea, palpitations and paroxysmal nocturnal dyspnea.   Respiratory:  Positive for shortness of breath.    Neurological:  Negative for dizziness and light-headedness.       Vitals:    03/19/25 1512   BP: 124/82   BP Site: Left Upper Arm   Patient Position: Sitting   BP Cuff Size: Large Adult   Pulse: 69   Weight: (!) 146.9 kg (323 lb 12.8 oz)   Height: 1.778 m (5' 10\")     Wt Readings from Last 3 Encounters:   03/19/25 (!) 146.9 kg (323 lb 12.8 oz)   02/26/25 (!) 146.9 kg (323 lb 12.8 oz)   01/22/25 (!) 145.4 kg (320 lb 9.6 oz)      Body mass index is 46.46 kg/m².     Physical Exam  Vitals reviewed.   Constitutional:       Appearance: He is obese.   Eyes:      Pupils: Pupils are equal, round, and reactive to light.   Neck:      Vascular: No carotid bruit.   Cardiovascular:      Rate and Rhythm: Normal rate and regular rhythm.      Pulses: Normal pulses.   Pulmonary:      Effort: Pulmonary effort is normal.      Breath sounds: Normal breath sounds.   Chest:

## 2025-03-19 NOTE — PROGRESS NOTES
CLINICAL STAFF DOCUMENTATION    Osiris Richey, EDY     Napoleon Mcneil  1950  [id number]    Have you had any Chest Pain recently? - No  Have you had any Shortness of Breath - Yes SOBOE  Have you had any dizziness - No  Have you had any palpitations recently? - No  Do you have any edema - swelling in No    Is the patient on any of the following medications - Dofetilide (Tikosyn)  When did you have your last labs drawn 3/13  What doctor ordered Kaiden  Do we have the labs in their chart Yes  Do you have a surgery or procedure scheduled in the near future - No  Do use tobacco products? - No  Do you drink alcohol? - Occasional alcohol  Do you use any illicit drugs? - No  Caffeine? - Yes  How much caffeine? .1  cups coffee every morning      Check medication list thoroughly!!! AND RECONCILE OUTSIDE MEDICATIONS  If dose has changed change the entire order not just the MG  BE SURE TO ASK PATIENT IF THEY NEED MEDICATION REFILLS  Verify Pharmacy and update if incorrect    Add to every patient's \"wrap up\" the following dot phrase AFTERVISITCARDIOHEARTHOUSE and ensure we explain this to our patients

## 2025-03-24 ENCOUNTER — TELEPHONE (OUTPATIENT)
Dept: CARDIOLOGY CLINIC | Age: 75
End: 2025-03-24

## 2025-03-26 ENCOUNTER — OFFICE VISIT (OUTPATIENT)
Dept: BARIATRICS/WEIGHT MGMT | Age: 75
End: 2025-03-26

## 2025-03-26 VITALS — WEIGHT: 314.6 LBS | HEIGHT: 69 IN | BODY MASS INDEX: 46.6 KG/M2

## 2025-03-26 DIAGNOSIS — E66.01 MORBID OBESITY WITH BMI OF 45.0-49.9, ADULT: Primary | ICD-10-CM

## 2025-03-26 NOTE — PROGRESS NOTES
Outpatient Nutrition Counseling - Non-Surgical Weight Loss Program    REASON FOR VISIT: Weigh In    Chief Complaint:    Chief Complaint   Patient presents with    Weight Management       SUBJECTIVE:  The patient is a 74 y.o. male being seen for obesity, enrolled in Non-Surgical Weight Loss Program; Napoleon's, Height: 175.9 cm (5' 9.25\"), Weight - Scale: (!) 142.7 kg (314 lb 9.6 oz), Current Body mass index is 46.12 kg/m².  patient's PCP is Jama Richards MD     Comorbid Conditions:  Significant diseases affecting this patient are   Past Medical History:   Diagnosis Date    Atrial fibrillation (HCC)     Cardiac pacemaker 2/19/2014    BIV ICD Serial # BL [id number] Moderl # [id number]    Cardiomyopathy (HCC)     COPD (chronic obstructive pulmonary disease) (HCC)     Sees Dr. Dobbs    Hematoma - postoperative 02/19/2014    Pseudo aneurism post op at [facility] leftt groin    History of blood transfusion 2014    No Reaction To Blood Transfusion Received    History of cardiac cath 12/4/2013 12/13 EF30% mild LAD, CX     History of cardiovascular disorder 12/4/2013    EF 29%, mild ischemia RCA    History of cardioversion 12/6/2013 12/13 unsuccessful    History of cardioversion 5/18/15    DCCV-.     History of echocardiogram 04/01/2019    History of transesophageal echocardiography (KVNG) for monitoring 12/4/2013 12/13 no clots    Hyperlipidemia     Hypertelorism     Hypertension     Obesity     S/P ablation of atrial fibrillation 02/14/2014    for a-fib by dr. Rios    S/P cardiac cath 2/5/14    Shortness of breath on exertion     Sleep apnea     Uses CPAP    Teeth missing     Upper And Lower    Wears glasses     [place] teeth extracted     4 [place] Teeth Extracted In Past   .    Review of Systems - Review of Systems  Otherwise per HPI.    Allergies:  No Known Allergies    Past Surgical History:  Past Surgical History:   Procedure Laterality Date    ABLATION OF DYSRHYTHMIC FOCUS  02/17/2022    atrial flutter

## 2025-04-10 RX ORDER — LISINOPRIL 40 MG/1
40 TABLET ORAL DAILY
Qty: 90 TABLET | Refills: 3 | Status: SHIPPED | OUTPATIENT
Start: 2025-04-10

## 2025-04-14 PROCEDURE — 93295 DEV INTERROG REMOTE 1/2/MLT: CPT | Performed by: INTERNAL MEDICINE

## 2025-04-14 PROCEDURE — 93296 REM INTERROG EVL PM/IDS: CPT | Performed by: INTERNAL MEDICINE

## 2025-04-23 ENCOUNTER — TELEPHONE (OUTPATIENT)
Dept: CARDIOLOGY CLINIC | Age: 75
End: 2025-04-23

## 2025-04-23 NOTE — TELEPHONE ENCOUNTER
Manish Montoya needs scheduled w/ [facility] asap  LM for pt to call back and be put on the schedule Monday pm.  It will mean double booking, please do so anyway  thank you

## 2025-04-24 NOTE — PROGRESS NOTES
MRN: [id number]  Name: Napoleon Mcneil  : 1950    Insurance: Payor: [employer] /  /  /      Phone #: [phone]  Provider: Mihir Hutchison MD     Date of Visit: 2025    Reason for visit: discuss test results  Recent Hospitalization Date:    Reason for Hospitalization:    Last EKG: 3/25  Type of Device:        Vitals BP HR O2% WT HT ORTHO BP LYING ORTHO BP SITTING ORTHO BP SITTING   Today's Findings           Patients work up- Check List     Testing Last Date Completed Date Expected  ([facility]) Additional Notes    MA to document For provider to complete Either MA or Provider    Carotid Duplex  STAT 1 WK 6 MTH       THIS WK 2 WK 1 YEAR     Cardiac CTA  STAT 1 WK 6 MTH       THIS WK 2 WK 1 YEAR     Cardiac CT Calcium scoring  STAT 1 WK 6 MTH       THIS WK 2 WK 1 YEAR     CTA Chest, Abdomen & Pelvis  STAT 1 WK 6 MTH       THIS WK 2 WK 1 YEAR     CT Chest IV w/ Contrast  STAT 1 WK 6 MTH       THIS WK 2 WK 1 YEAR     CT Chest w/o Contrast  STAT 1 WK 6 MTH       THIS WK 2 WK 1 YEAR     CXR  STAT 1 WK 6 MTH       THIS WK 2 WK 1 YEAR     ECHO 10/24 Stress Complete Limited     MRI- Cardiac  STAT 1 WK 6 MTH       THIS WK 2 WK 1 YEAR     MUGA Scan  STAT 1 WK 6 MTH       THIS WK 2 WK 1 YEAR     Nuclear Stress  Lexiscan Cardiolite     PFT  STAT 1 WK 6 MTH       THIS WK 2 WK 1 YEAR     Treadmill Stress Test  STAT 1 WK 6 MTH       THIS WK 2 WK 1 YEAR     Vascular Duplex  Lower: Right Left Bilat       Upper: Right Left Bilat     Other Test Not Listed:    Monitors Last Date Completed Day's Request/Ordered     Holter  Short term 24 hours 48 hours      Long term 3 days 7 days 14 days   Event   (1-30 days)      Procedures Last Date Performed Procedure Details Date Expected   Additional Notes    ASD Closure        Carotid Angio        Cardioversion        Heart Cath  R L R&L      Peripheral Angio  R L      PFO Closure        PTCA/PCI        KVNG        KVNG/Cardioversion        Venogram        Tilt Table

## 2025-04-28 ENCOUNTER — OFFICE VISIT (OUTPATIENT)
Dept: CARDIOLOGY CLINIC | Age: 75
End: 2025-04-28
Payer: MEDICARE

## 2025-04-28 VITALS
SYSTOLIC BLOOD PRESSURE: 134 MMHG | BODY MASS INDEX: 45.1 KG/M2 | HEIGHT: 70 IN | DIASTOLIC BLOOD PRESSURE: 86 MMHG | WEIGHT: 315 LBS | HEART RATE: 72 BPM

## 2025-04-28 DIAGNOSIS — R94.39 ABNORMAL NUCLEAR STRESS TEST: ICD-10-CM

## 2025-04-28 DIAGNOSIS — Z01.810 PRE-OPERATIVE CARDIOVASCULAR EXAMINATION: Primary | ICD-10-CM

## 2025-04-28 DIAGNOSIS — Z95.810 ICD (IMPLANTABLE CARDIOVERTER-DEFIBRILLATOR), BIVENTRICULAR, IN SITU: Primary | ICD-10-CM

## 2025-04-28 PROCEDURE — G8417 CALC BMI ABV UP PARAM F/U: HCPCS | Performed by: INTERNAL MEDICINE

## 2025-04-28 PROCEDURE — 3017F COLORECTAL CA SCREEN DOC REV: CPT | Performed by: INTERNAL MEDICINE

## 2025-04-28 PROCEDURE — 1036F TOBACCO NON-USER: CPT | Performed by: INTERNAL MEDICINE

## 2025-04-28 PROCEDURE — 1159F MED LIST DOCD IN RCRD: CPT | Performed by: INTERNAL MEDICINE

## 2025-04-28 PROCEDURE — 1124F ACP DISCUSS-NO DSCNMKR DOCD: CPT | Performed by: INTERNAL MEDICINE

## 2025-04-28 PROCEDURE — 3079F DIAST BP 80-89 MM HG: CPT | Performed by: INTERNAL MEDICINE

## 2025-04-28 PROCEDURE — 3075F SYST BP GE 130 - 139MM HG: CPT | Performed by: INTERNAL MEDICINE

## 2025-04-28 PROCEDURE — G8427 DOCREV CUR MEDS BY ELIG CLIN: HCPCS | Performed by: INTERNAL MEDICINE

## 2025-04-28 PROCEDURE — 99214 OFFICE O/P EST MOD 30 MIN: CPT | Performed by: INTERNAL MEDICINE

## 2025-04-28 NOTE — PATIENT INSTRUCTIONS
Thank you for allowing us to care for you today!   We want to ensure we can follow your treatment plan and we strive to give you the best outcomes and experience possible.   If you ever have a life threatening emergency and call 911 - for an ambulance (EMS)  REMEMBER  Our providers can only care for you at:   [facility] or [facility]   Even if you have someone take you or you drive yourself we can only care for you in a [facility] facility. Our providers are not setup at the other healthcare locations!    PLEASE CALL OUR OFFICE DURING NORMAL BUSINESS HOURS  Monday through Friday 8 am to 5 pm  AFTER HOURS the physician on-call cannot help with scheduling, rescheduling, procedure instruction questions or any type of medication need or issue.  [facility] P:[phone] - [facility] P:[phone] - [facility] P:[phone]      If you receive a survey:  We would appreciate you taking the time to share your experience concerning your provider visit in the office.    These surveys are confidential!  We are eager to improve and are counting on you to share your feedback so we can ensure you get the best care possible.

## 2025-04-28 NOTE — PROGRESS NOTES
CLINICAL STAFF DOCUMENTATION    Dr. Mihir Mcneil  1950  [id number]    Have you had any Chest Pain recently? - No          Have you had any Shortness of Breath - No  Have you had any dizziness - No  If Yes DO ORTHOSTATIC BP including pulse  Have you had any palpitations recently? - No  Any thyroid issues? - No    Do you have any edema - swelling in No    Is the patient on any of the following medications - Tikosyn  If Yes DO EKG - Needs done every 3 months    When did you have your last labs drawn   What doctor ordered   Do we have the labs in their chart   If we do not have the labs, ask where they were drawn     If we do not have these labs, you are retrieve these labs for the provider!    Do you need any prescriptions refilled? -     Do you have a surgery or procedure scheduled in the near future - No  Do use tobacco products? - occ  Do you drink alcohol? - occ  Do you use any illicit drugs? - No  Caffeine? - Yes  How much caffeine? .1  cups         
Age >= 75 No,  (74 y.o.) 0   D DM No 0   S2 Prior Stroke/TIA No 0   V Vascular Disease No 0   A Age 65-74 Yes,  (74 y.o.) 1   Sc Sex male 0    [id number]  Score  2   Score last updated 9/16/24 1:26 PM EDT    Click here for a link to the UpToDate guideline \"Atrial Fibrillation: Anticoagulation therapy to prevent embolization    Disclaimer: Risk Score calculation is dependent on accuracy of patient problem list and past encounter diagnosis.     Mortality from the morbid obesity is very high:     Body mass index is 45.48 kg/m².      Stress Combined Conclusion: The study is most consistent with myocardial ischemia. Findings suggest a high risk of cardiac events.    ECG: Resting ECG demonstrates AV pacing.    Stress Test: A pharmacological stress test was performed using regadenoson (Lexiscan). PO caffeine given as a reversal agent. Blood pressure demonstrated a normal response and heart rate demonstrated a normal response to stress. The patient's heart rate recovery was normal.    F/u 4/29/2025     Impression:  This is an abnormal Lexiscan Cardiolite study.  Patient's baseline EKG reveals DDD pacing.  SPECT images obtained in standard short axis, vertical and horizontal long axis views reveal moderate reduction of Cardiolite uptake in the inferior wall area with severe reduction in the apex. And delayed imaging there is improvement in the inferior wall area but apex does not change significantly. These findings are suggestive of large area of moderate to severe ischemia.  Gated scan performed reveals mild global hypokinesis with a mildly reduced LV function ejection fraction is 44%.  Clinical correlation is recommended. Arrange office visit for review.       [facility] bijal pt      An electronic signature was used to authenticate this note.    --Mihir Hutchison MD

## 2025-04-29 ENCOUNTER — TELEPHONE (OUTPATIENT)
Dept: CARDIOLOGY CLINIC | Age: 75
End: 2025-04-29

## 2025-04-29 NOTE — TELEPHONE ENCOUNTER
[facility]     Dr. Mihir Hutchison     LEFT HEART CATHETERIZATION WITH POSSIBLE PERCUTANEOUS CORONARY INTERVENTION    Patient Name: Napoleon Mcneil   : 1950  MRN# [id number]    Date of Procedure: 2025 Time: 10.00 Arrival Time: 8.00    The catheterization and angiogram are usually outpatient procedures, however if stenting is needed you may need to stay overnight. You will need to arrive at the hospital two hours before the procedure.  You will go to registration in the main lobby.  You will need to arrange for someone to drive you home.      HOSPITAL:  [facility]      X   If you have received orders for blood work and or a chest x-ray, please have         them done on assigned date at [facility],           [facility], or [facility].     X Please do not have anything by mouth after midnight prior to or 8 hours before   the procedure.    X You may take your medications with a sip of water in the morning of your               procedure or take them with you to the hospital                      x If you take  Eliquis, you should hold it for 48 hours before your procedure.         x If you take Viagra (Sildenafil) or Cialis (Tadalafil) you will need to hold it for 3 days before your procedure.

## 2025-04-29 NOTE — TELEPHONE ENCOUNTER
Patient given instructions over telephone on 04/29/2025.  Procedure is scheduled for 05/02/2025 @ 10.00, w/arrival @ 8.00, @ [facility]. Medication/Education Letter gone over with patient. Procedure and risks were explained to patient. Questions answered, Patient voiced understanding.        Patient was notified that procedure date or time could be changed due to an emergency. Patient voiced understanding.    Asked about c-pap should he bring it incase he has to stay the night , yes if you feel you need it over night

## 2025-04-30 ENCOUNTER — HOSPITAL ENCOUNTER (OUTPATIENT)
Age: 75
Discharge: HOME | End: 2025-04-30
Payer: MEDICARE

## 2025-04-30 DIAGNOSIS — I10 PRIMARY HYPERTENSION: ICD-10-CM

## 2025-04-30 DIAGNOSIS — Z01.810 PRE-OPERATIVE CARDIOVASCULAR EXAMINATION: ICD-10-CM

## 2025-04-30 LAB
ABO + RH BLD: NORMAL
ANION GAP SERPL CALCULATED.3IONS-SCNC: 11 MMOL/L (ref 9–17)
BLOOD BANK COMMENT: NORMAL
BLOOD BANK SAMPLE EXPIRATION: NORMAL
BLOOD GROUP ANTIBODIES SERPL: NEGATIVE
BUN SERPL-MCNC: 16 MG/DL (ref 7–20)
CALCIUM SERPL-MCNC: 9.8 MG/DL (ref 8.3–10.6)
CHLORIDE SERPL-SCNC: 103 MMOL/L (ref 99–110)
CO2 SERPL-SCNC: 24 MMOL/L (ref 21–32)
CREAT SERPL-MCNC: 1 MG/DL (ref 0.8–1.3)
ERYTHROCYTE [DISTWIDTH] IN BLOOD BY AUTOMATED COUNT: 13.7 % (ref 11.7–14.9)
GFR, ESTIMATED: 73 ML/MIN/1.73M2
GLUCOSE SERPL-MCNC: 114 MG/DL (ref 74–99)
HCT VFR BLD AUTO: 45.4 % (ref 42–52)
HGB BLD-MCNC: 14.2 G/DL (ref 13.5–18)
MCH RBC QN AUTO: 28.8 PG (ref 27–31)
MCHC RBC AUTO-ENTMCNC: 31.3 G/DL (ref 32–36)
MCV RBC AUTO: 92.1 FL (ref 78–100)
PLATELET # BLD AUTO: 230 K/UL (ref 140–440)
PMV BLD AUTO: 11 FL (ref 7.5–11.1)
POTASSIUM SERPL-SCNC: 4.4 MMOL/L (ref 3.5–5.1)
RBC # BLD AUTO: 4.93 M/UL (ref 4.6–6.2)
SODIUM SERPL-SCNC: 138 MMOL/L (ref 136–145)
WBC OTHER # BLD: 7.6 K/UL (ref 4–10.5)

## 2025-04-30 PROCEDURE — 86901 BLOOD TYPING SEROLOGIC RH(D): CPT

## 2025-04-30 PROCEDURE — 86900 BLOOD TYPING SEROLOGIC ABO: CPT

## 2025-04-30 PROCEDURE — 86850 RBC ANTIBODY SCREEN: CPT

## 2025-04-30 PROCEDURE — 85027 COMPLETE CBC AUTOMATED: CPT

## 2025-04-30 PROCEDURE — 36415 COLL VENOUS BLD VENIPUNCTURE: CPT

## 2025-04-30 PROCEDURE — 80048 BASIC METABOLIC PNL TOTAL CA: CPT

## 2025-04-30 RX ORDER — AMLODIPINE BESYLATE 5 MG/1
5 TABLET ORAL DAILY
Qty: 90 TABLET | Refills: 3 | Status: SHIPPED | OUTPATIENT
Start: 2025-04-30

## 2025-05-02 ENCOUNTER — HOSPITAL ENCOUNTER (OUTPATIENT)
Age: 75
Setting detail: OUTPATIENT SURGERY
Discharge: HOME | End: 2025-05-02
Attending: INTERNAL MEDICINE | Admitting: INTERNAL MEDICINE
Payer: MEDICARE

## 2025-05-02 VITALS
BODY MASS INDEX: 45.1 KG/M2 | RESPIRATION RATE: 16 BRPM | HEART RATE: 70 BPM | OXYGEN SATURATION: 96 % | DIASTOLIC BLOOD PRESSURE: 73 MMHG | WEIGHT: 315 LBS | TEMPERATURE: 96.8 F | SYSTOLIC BLOOD PRESSURE: 141 MMHG | HEIGHT: 70 IN

## 2025-05-02 DIAGNOSIS — R94.39 ABNORMAL NUCLEAR STRESS TEST: ICD-10-CM

## 2025-05-02 LAB
ACTIVATED CLOTTING TIME, LOW RANGE: >400 SEC (ref 89–169)
ECHO BSA: 2.66 M2

## 2025-05-02 PROCEDURE — C1769 GUIDE WIRE: HCPCS | Performed by: INTERNAL MEDICINE

## 2025-05-02 PROCEDURE — 85347 COAGULATION TIME ACTIVATED: CPT

## 2025-05-02 PROCEDURE — C1874 STENT, COATED/COV W/DEL SYS: HCPCS | Performed by: INTERNAL MEDICINE

## 2025-05-02 PROCEDURE — 7100000011 HC PHASE II RECOVERY - ADDTL 15 MIN: Performed by: INTERNAL MEDICINE

## 2025-05-02 PROCEDURE — 93458 L HRT ARTERY/VENTRICLE ANGIO: CPT | Performed by: INTERNAL MEDICINE

## 2025-05-02 PROCEDURE — C1725 CATH, TRANSLUMIN NON-LASER: HCPCS | Performed by: INTERNAL MEDICINE

## 2025-05-02 PROCEDURE — 92928 PRQ TCAT PLMT NTRAC ST 1 LES: CPT | Performed by: INTERNAL MEDICINE

## 2025-05-02 PROCEDURE — C1887 CATHETER, GUIDING: HCPCS | Performed by: INTERNAL MEDICINE

## 2025-05-02 PROCEDURE — 6360000002 HC RX W HCPCS: Performed by: INTERNAL MEDICINE

## 2025-05-02 PROCEDURE — 93005 ELECTROCARDIOGRAM TRACING: CPT | Performed by: INTERNAL MEDICINE

## 2025-05-02 PROCEDURE — 92920 PRQ TRLUML C ANGIOP 1ART&/BR: CPT | Performed by: INTERNAL MEDICINE

## 2025-05-02 PROCEDURE — C1894 INTRO/SHEATH, NON-LASER: HCPCS | Performed by: INTERNAL MEDICINE

## 2025-05-02 PROCEDURE — 7100000010 HC PHASE II RECOVERY - FIRST 15 MIN: Performed by: INTERNAL MEDICINE

## 2025-05-02 PROCEDURE — 6360000004 HC RX CONTRAST MEDICATION: Performed by: INTERNAL MEDICINE

## 2025-05-02 PROCEDURE — 6370000000 HC RX 637 (ALT 250 FOR IP): Performed by: INTERNAL MEDICINE

## 2025-05-02 PROCEDURE — 2709999900 HC NON-CHARGEABLE SUPPLY: Performed by: INTERNAL MEDICINE

## 2025-05-02 PROCEDURE — 2500000003 HC RX 250 WO HCPCS: Performed by: INTERNAL MEDICINE

## 2025-05-02 PROCEDURE — C9600 PERC DRUG-EL COR STENT SING: HCPCS | Performed by: INTERNAL MEDICINE

## 2025-05-02 PROCEDURE — 85347 COAGULATION TIME ACTIVATED: CPT | Performed by: INTERNAL MEDICINE

## 2025-05-02 PROCEDURE — 2580000003 HC RX 258: Performed by: INTERNAL MEDICINE

## 2025-05-02 DEVICE — STENT CORONARY ONYX FRONTIER RX 3X38 MM ZOTAROLIMUS ELUT: Type: IMPLANTABLE DEVICE | Status: FUNCTIONAL

## 2025-05-02 RX ORDER — SODIUM CHLORIDE 9 MG/ML
INJECTION, SOLUTION INTRAVENOUS CONTINUOUS
Status: DISCONTINUED | OUTPATIENT
Start: 2025-05-02 | End: 2025-05-02 | Stop reason: HOSPADM

## 2025-05-02 RX ORDER — 0.9 % SODIUM CHLORIDE 0.9 %
250 INTRAVENOUS SOLUTION INTRAVENOUS ONCE
Status: COMPLETED | OUTPATIENT
Start: 2025-05-02 | End: 2025-05-02

## 2025-05-02 RX ORDER — HEPARIN SODIUM 200 [USP'U]/100ML
INJECTION, SOLUTION INTRAVENOUS PRN
Status: DISCONTINUED | OUTPATIENT
Start: 2025-05-02 | End: 2025-05-02 | Stop reason: HOSPADM

## 2025-05-02 RX ORDER — CLOPIDOGREL BISULFATE 75 MG/1
TABLET ORAL PRN
Status: DISCONTINUED | OUTPATIENT
Start: 2025-05-02 | End: 2025-05-02 | Stop reason: HOSPADM

## 2025-05-02 RX ORDER — EPTIFIBATIDE 2 MG/ML
INJECTION, SOLUTION INTRAVENOUS PRN
Status: DISCONTINUED | OUTPATIENT
Start: 2025-05-02 | End: 2025-05-02 | Stop reason: HOSPADM

## 2025-05-02 RX ORDER — SODIUM CHLORIDE 0.9 % (FLUSH) 0.9 %
5-40 SYRINGE (ML) INJECTION PRN
Status: DISCONTINUED | OUTPATIENT
Start: 2025-05-02 | End: 2025-05-02 | Stop reason: HOSPADM

## 2025-05-02 RX ORDER — SODIUM CHLORIDE 0.9 % (FLUSH) 0.9 %
5-40 SYRINGE (ML) INJECTION EVERY 12 HOURS SCHEDULED
Status: DISCONTINUED | OUTPATIENT
Start: 2025-05-02 | End: 2025-05-02 | Stop reason: HOSPADM

## 2025-05-02 RX ORDER — ACETAMINOPHEN 325 MG/1
650 TABLET ORAL EVERY 4 HOURS PRN
Status: DISCONTINUED | OUTPATIENT
Start: 2025-05-02 | End: 2025-05-02 | Stop reason: HOSPADM

## 2025-05-02 RX ORDER — DIAZEPAM 5 MG/1
5 TABLET ORAL ONCE
Status: COMPLETED | OUTPATIENT
Start: 2025-05-02 | End: 2025-05-02

## 2025-05-02 RX ORDER — ASPIRIN 81 MG/1
81 TABLET, CHEWABLE ORAL DAILY
Status: DISCONTINUED | OUTPATIENT
Start: 2025-05-03 | End: 2025-05-02 | Stop reason: HOSPADM

## 2025-05-02 RX ORDER — ASPIRIN 325 MG
TABLET, DELAYED RELEASE (ENTERIC COATED) ORAL PRN
Status: DISCONTINUED | OUTPATIENT
Start: 2025-05-02 | End: 2025-05-02 | Stop reason: HOSPADM

## 2025-05-02 RX ORDER — DIPHENHYDRAMINE HCL 25 MG
25 TABLET ORAL ONCE
Status: COMPLETED | OUTPATIENT
Start: 2025-05-02 | End: 2025-05-02

## 2025-05-02 RX ORDER — SODIUM CHLORIDE 9 MG/ML
INJECTION, SOLUTION INTRAVENOUS PRN
Status: DISCONTINUED | OUTPATIENT
Start: 2025-05-02 | End: 2025-05-02 | Stop reason: HOSPADM

## 2025-05-02 RX ORDER — CLOPIDOGREL BISULFATE 75 MG/1
75 TABLET ORAL DAILY
Qty: 30 TABLET | Refills: 3 | Status: SHIPPED | OUTPATIENT
Start: 2025-05-02

## 2025-05-02 RX ORDER — IOPAMIDOL 755 MG/ML
INJECTION, SOLUTION INTRAVASCULAR PRN
Status: DISCONTINUED | OUTPATIENT
Start: 2025-05-02 | End: 2025-05-02 | Stop reason: HOSPADM

## 2025-05-02 RX ORDER — SODIUM CHLORIDE 9 MG/ML
INJECTION, SOLUTION INTRAVENOUS CONTINUOUS PRN
Status: COMPLETED | OUTPATIENT
Start: 2025-05-02 | End: 2025-05-02

## 2025-05-02 RX ORDER — CLOPIDOGREL BISULFATE 75 MG/1
75 TABLET ORAL DAILY
Status: DISCONTINUED | OUTPATIENT
Start: 2025-05-03 | End: 2025-05-02 | Stop reason: HOSPADM

## 2025-05-02 RX ADMIN — DIAZEPAM 5 MG: 5 TABLET ORAL at 08:34

## 2025-05-02 RX ADMIN — SODIUM CHLORIDE 250 ML: 0.9 INJECTION, SOLUTION INTRAVENOUS at 10:26

## 2025-05-02 RX ADMIN — DIPHENHYDRAMINE HYDROCHLORIDE 25 MG: 25 TABLET ORAL at 08:34

## 2025-05-02 RX ADMIN — SODIUM CHLORIDE: 0.9 INJECTION, SOLUTION INTRAVENOUS at 10:25

## 2025-05-02 RX ADMIN — SODIUM CHLORIDE: 9 INJECTION, SOLUTION INTRAVENOUS at 08:34

## 2025-05-02 NOTE — FLOWSHEET NOTE
Discharge instructions reviewed with patient per YAJAIRA Gusman Voices understanding.  Ambulated without difficulty.

## 2025-05-02 NOTE — PLAN OF CARE
Per Dr. Hutchison - give patient a 250ml bolus of NS at this time. Started per Henna GATES. Lizette Burden RN 5/2/2025

## 2025-05-02 NOTE — PROGRESS NOTES
Outpatient Pharmacy Progress Note for Meds-to-Beds    Total number of Prescriptions Filled: 1    Additional Documentation:  {Blank single:74198::\"Medication(s) were delivered to the patient's room prior to discharge\",\"Patient picked-up the medication(s) in the OP Pharmacy\",\"Patient's family member picked-up the medication(s) in the OP Pharmacy\",\"Medications given to nurse *** to provide to patient\",\"Medications picked-up in the OP Pharmacy by nurse *** to provide to patient\",\"Medication(s) delivered to the patient's room prior to discharge by a volunteer\"}      Thank you for letting [place] [address]    Phone: [phone]    Fax: [phone]

## 2025-05-02 NOTE — H&P
Napoleon  is a  Established patient  ,74 y.o.   male here for evaluation of the following chief complaint(s):    DCM  Here for cardiac catheterization           SUBJECTIVE/OBJECTIVE:  HPI : h/o  DCM, a fib, ICD now here  Has worsening SOB     Review of Systems    neg     Vitals       Vitals:     04/28/25 1353   BP: 134/86   BP Site: Left Upper Arm   Patient Position: Sitting   BP Cuff Size: Large Adult   Pulse: 72   Weight: (!) 143.8 kg (317 lb)   Height: 1.778 m (5' 10\")                  /86 (BP Site: Left Upper Arm, Patient Position: Sitting, BP Cuff Size: Large Adult)   Pulse 72   Ht 1.778 m (5' 10\")   Wt (!) 143.8 kg (317 lb)   BMI 45.48 kg/m²        7/27/2020     1:46 PM   Patient-Reported Vitals   Patient-Reported Weight 268 lbs   Patient-Reported Height 5' 10\"   Patient-Reported Systolic 122 mmHg   Patient-Reported Diastolic 78 mmHg   Patient-Reported Pulse 71   Patient-Reported Temperature 97.7          Wt Readings from Last 3 Encounters:   04/28/25 (!) 143.8 kg (317 lb)   03/26/25 (!) 142.7 kg (314 lb 9.6 oz)   03/19/25 (!) 146.9 kg (323 lb 12.8 oz)      Body mass index is 45.48 kg/m².     Physical Exam     Neck: JVD      Lungs : clear    Cardio : Si and S2 audilble      Ext: edema       All pertinent data reviewed       Meds : reviewed           Tests ordered    ICD check     ASSESSMENT/PLAN:     - DCM  On meds    Reecho check EF     - Atrial fibrillation, pt is  compliant with meds. Patient does not have symptoms from atrial fibrillation  Sp ablation for atrial flutter ablation of mitral valve annulus better on Tikosyn therapy history of cardioversion     - ICD: carelink     ICD Analysis: carelink reviewed  Normal OptiVol           - NSVT   Stable no episodes     - ANTOINE on cpap     - Atrial fibrillation, pt is  compliant with meds. Patient does not have symptoms from atrial fibrillation\  On Eliquis  [id number] Score for Atrial Fibrillation Stroke Risk    Risk   Factors   Component Value   C

## 2025-05-02 NOTE — PROGRESS NOTES
Seen by cardiac rehab status post PTCA.  Patient  awake, alert and sitting up in bed. I introduced myself as the cardiac rehab nurse as well as introducing him to the [facility]. I explained to him that this program is a customized out patient program of exercise and education. I explained to the patient that Cardiac Rehab is designed to help improve your hearts future.  Cardiac rehab is a medically supervised program designed to improve your cardiovascular health through educating about nutrition, exercise, and stress release.          Teaching done on A&P of coronary arteries, location of lesions, formation of CAD, symptoms of heart disease, and on procedure performed. Stressed to him the importance of compliance with antiplatelet therapy.  Discussed risk factor identification and modification. Teaching done on cardiac diet.  Explained the benefits of regular exercise program and stressed the need for a long term commitment to heart healthy practices in controlling this chronic disease.  Patient viewing video of the [facility] program overview at this time.

## 2025-05-03 LAB
EKG ATRIAL RATE: 70 BPM
EKG DIAGNOSIS: NORMAL
EKG P AXIS: 58 DEGREES
EKG P-R INTERVAL: 176 MS
EKG Q-T INTERVAL: 468 MS
EKG QRS DURATION: 148 MS
EKG QTC CALCULATION (BAZETT): 505 MS
EKG R AXIS: 222 DEGREES
EKG T AXIS: 70 DEGREES
EKG VENTRICULAR RATE: 70 BPM

## 2025-05-03 PROCEDURE — 93010 ELECTROCARDIOGRAM REPORT: CPT | Performed by: INTERNAL MEDICINE

## 2025-05-05 ENCOUNTER — TELEPHONE (OUTPATIENT)
Dept: CARDIOLOGY CLINIC | Age: 75
End: 2025-05-05

## 2025-05-05 NOTE — TELEPHONE ENCOUNTER
Spoke with pt for post procedure call. Pt states he is doing well and denies any concerns at this time. Pt reminded of f/u appointment on 5/16/2025. Pt voiced understanding.

## 2025-05-07 NOTE — PROGRESS NOTES
MRN: [id number]  Name: Napoleon Mcneil  : 1950    Insurance: Payor: [employer] MEDICARE /  /  /      Phone #: [phone]  Provider: Mihir Hutchison MD     Date of Visit: 2025    Reason for visit:  LHC w/ stents NEED EKG Recent Hospitalization Date:    Reason for Hospitalization:    Last EK/3/2025  Type of Device: 2025  Medtronic BIV-ICD CARELINK   Implant Date 10/21/2021 (Battery Replacement)  [place] MRI [id number]   SN [id number]  Atrial Lead [place]   SN [id number]   Implant Date 2014   L Ventricular Lead Medtronic   [place] [id number]  SN [id number]  Implant Date 2014   L Ventricular Lead [place]  1258T  SN [id number]  Implant Date 2014     Vitals BP HR O2% WT HT ORTHO BP LYING ORTHO BP SITTING ORTHO BP SITTING   Today's Findings           Patients work up- Check List     Testing Last Date Completed Date Expected  ([place]) Additional Notes    MA to document For provider to complete Either MA or Provider    Carotid Duplex  STAT 1 WK 6 MTH       THIS WK 2 WK 1 YEAR     Cardiac CTA  STAT 1 WK 6 MTH       THIS WK 2 WK 1 YEAR     Cardiac CT Calcium scoring  STAT 1 WK 6 MTH       THIS WK 2 WK 1 YEAR     CTA Chest, Abdomen & Pelvis  STAT 1 WK 6 MTH       THIS WK 2 WK 1 YEAR     CT Chest IV w/ Contrast  STAT 1 WK 6 MTH       THIS WK 2 WK 1 YEAR     CT Chest w/o Contrast  STAT 1 WK 6 MTH       THIS WK 2 WK 1 YEAR     CXR  STAT 1 WK 6 MTH       THIS WK 2 WK 1 YEAR     ECHO  Stress Complete Limited     MRI- Cardiac  STAT 1 WK 6 MTH       THIS WK 2 WK 1 YEAR     MUGA Scan  STAT 1 WK 6 MTH       THIS WK 2 WK 1 YEAR     Nuclear Stress 2025 Lexiscan Cardiolite     PFT  STAT 1 WK 6 MTH       THIS WK 2 WK 1 YEAR     Treadmill Stress Test  STAT 1 WK 6 MTH       THIS WK 2 WK 1 YEAR     Vascular Duplex  Lower: Right Left Bilat       Upper: Right Left Bilat     Other Test Not Listed:    Monitors Last Date Completed Day's Request/Ordered     Holter  Short term 24 hours 48 hours      Long

## 2025-05-16 ENCOUNTER — OFFICE VISIT (OUTPATIENT)
Dept: CARDIOLOGY CLINIC | Age: 75
End: 2025-05-16
Payer: MEDICARE

## 2025-05-16 VITALS
WEIGHT: 312.2 LBS | BODY MASS INDEX: 44.69 KG/M2 | SYSTOLIC BLOOD PRESSURE: 134 MMHG | HEIGHT: 70 IN | HEART RATE: 70 BPM | DIASTOLIC BLOOD PRESSURE: 72 MMHG

## 2025-05-16 DIAGNOSIS — Z98.890 STATUS POST LEFT HEART CATHETERIZATION (LHC): Primary | ICD-10-CM

## 2025-05-16 DIAGNOSIS — R94.31 ABNORMAL ELECTROCARDIOGRAPHY: ICD-10-CM

## 2025-05-16 DIAGNOSIS — R06.02 SHORTNESS OF BREATH: ICD-10-CM

## 2025-05-16 PROCEDURE — 1036F TOBACCO NON-USER: CPT | Performed by: INTERNAL MEDICINE

## 2025-05-16 PROCEDURE — G8417 CALC BMI ABV UP PARAM F/U: HCPCS | Performed by: INTERNAL MEDICINE

## 2025-05-16 PROCEDURE — 99214 OFFICE O/P EST MOD 30 MIN: CPT | Performed by: INTERNAL MEDICINE

## 2025-05-16 PROCEDURE — 3017F COLORECTAL CA SCREEN DOC REV: CPT | Performed by: INTERNAL MEDICINE

## 2025-05-16 PROCEDURE — 3078F DIAST BP <80 MM HG: CPT | Performed by: INTERNAL MEDICINE

## 2025-05-16 PROCEDURE — 3075F SYST BP GE 130 - 139MM HG: CPT | Performed by: INTERNAL MEDICINE

## 2025-05-16 PROCEDURE — 1159F MED LIST DOCD IN RCRD: CPT | Performed by: INTERNAL MEDICINE

## 2025-05-16 PROCEDURE — 1124F ACP DISCUSS-NO DSCNMKR DOCD: CPT | Performed by: INTERNAL MEDICINE

## 2025-05-16 PROCEDURE — G8427 DOCREV CUR MEDS BY ELIG CLIN: HCPCS | Performed by: INTERNAL MEDICINE

## 2025-05-16 PROCEDURE — 93000 ELECTROCARDIOGRAM COMPLETE: CPT | Performed by: INTERNAL MEDICINE

## 2025-05-16 NOTE — PROGRESS NOTES
Napoleon  is a  Established patient  ,74 y.o.   male here for evaluation of the following chief complaint(s):    DCM  Here for follow-up on PTCA and stenting of the LAD           SUBJECTIVE/OBJECTIVE:  HPI : h/o  DCM, a fib, ICD now here  Has worsening SOB now here for follow-up on the PTCA stenting of the LAD     Review of Systems    neg     Vitals       Vitals:     04/28/25 1353   BP: 134/86   BP Site: Left Upper Arm   Patient Position: Sitting   BP Cuff Size: Large Adult   Pulse: 72   Weight: (!) 143.8 kg (317 lb)   Height: 1.778 m (5' 10\")                  /86 (BP Site: Left Upper Arm, Patient Position: Sitting, BP Cuff Size: Large Adult)   Pulse 72   Ht 1.778 m (5' 10\")   Wt (!) 143.8 kg (317 lb)   BMI 45.48 kg/m²        7/27/2020     1:46 PM   Patient-Reported Vitals   Patient-Reported Weight 268 lbs   Patient-Reported Height 5' 10\"   Patient-Reported Systolic 122 mmHg   Patient-Reported Diastolic 78 mmHg   Patient-Reported Pulse 71   Patient-Reported Temperature 97.7          Wt Readings from Last 3 Encounters:   04/28/25 (!) 143.8 kg (317 lb)   03/26/25 (!) 142.7 kg (314 lb 9.6 oz)   03/19/25 (!) 146.9 kg (323 lb 12.8 oz)      Body mass index is 45.48 kg/m².     Physical Exam     Neck: JVD      Lungs : clear    Cardio : Si and S2 audilble      Ext: edema       All pertinent data reviewed       Meds : reviewed           Tests ordered    ICD check     ASSESSMENT/PLAN:                 -     CORONARY ARTERY DISEASE:  symptomatic     All available  tests in chart reviewed. Management discussed .  Testing ordered  no                               Underwent PTCA and stenting of the LAD  Will get a regular treadmill stress test and cardiac rehab  Will monitor the ejection fraction    5/16/2025  Patient underwent cardiac cath based on the reduction of the ejection fraction which showed critical LAD stenosis which was intervened upon  Patient will be on Plavix and Eliquis  Plavix will be at least for 6

## 2025-05-16 NOTE — PROGRESS NOTES
CLINICAL STAFF DOCUMENTATION    Dr. Mihir Mcneil  1950  [id number]    Have you had any Chest Pain recently? - No  Have you had any Shortness of Breath - No  Have you had any dizziness - No  Have you had any palpitations recently? - No  Do you have any edema - swelling in No      Do you need any prescriptions refilled? - No    Do you have a surgery or procedure scheduled in the near future - No  Do use tobacco products? - No  Do you drink alcohol? - No  Do you use any illicit drugs? - No  Caffeine? - Yes  How much caffeine? .1  cups       Check medication list thoroughly!!! AND RECONCILE OUTSIDE MEDICATIONS  If dose has changed change the entire order not just the MG  BE SURE TO ASK PATIENT IF THEY NEED MEDICATION REFILLS  Verify Pharmacy and update if incorrect    Add to every patient's \"wrap up\" the following dot phrase AFTERVISITCARDIOHEARTHOUSE and ensure we explain this to our patients

## 2025-05-21 ENCOUNTER — TELEPHONE (OUTPATIENT)
Dept: CARDIOLOGY CLINIC | Age: 75
End: 2025-05-21

## 2025-05-22 NOTE — TELEPHONE ENCOUNTER
Spoke with Patient and rescheduled office visit with Charity Sharma NP  per Patients request, to 7/11/25 @ 140pm.    Patient advised understanding.

## 2025-05-28 ENCOUNTER — OFFICE VISIT (OUTPATIENT)
Dept: BARIATRICS/WEIGHT MGMT | Age: 75
End: 2025-05-28

## 2025-05-28 VITALS — HEIGHT: 69 IN | BODY MASS INDEX: 45.77 KG/M2 | WEIGHT: 309 LBS

## 2025-05-28 DIAGNOSIS — E66.01 MORBID OBESITY WITH BMI OF 45.0-49.9, ADULT (HCC): Primary | ICD-10-CM

## 2025-05-28 NOTE — PROGRESS NOTES
Cigars     Start date:      Quit date:      Years since quittin.4    Smokeless tobacco: Never   Vaping Use    Vaping status: Never Used   Substance and Sexual Activity    Alcohol use: Yes     Comment: \"Occ. Maybe Once A Week\". caffeine: 1 cup coffee a day    Drug use: No    Sexual activity: Not Currently   Other Topics Concern    Not on file   Social History Narrative    Not on file     Social Drivers of Health     Financial Resource Strain: Not on file   Food Insecurity: Not on file   Transportation Needs: Not on file   Physical Activity: Not on file   Stress: Not on file   Social Connections: Not on file   Intimate Partner Violence: Not on file   Housing Stability: Not on file         OBJECTIVE:  Physical Exam   Ht 1.759 m (5' 9.25\")   Wt (!) 140.2 kg (309 lb)   BMI 45.30 kg/m²        NUTRITION DIAGNOSIS: Overweight / Obesity   Problem: Increased adiposity compared to reference standard or established norm   Etiology: Excess intake compared to output over time   S/S: Ht: 5' 9.25\" Wt: 309 lb BMI: 45.30    NUTRITION INTERVENTIONS:    Individualized treatment goals to address nutrition diagnosis:   Instructed on 1600 calorie diet for weight loss   Provided sample menus, healthy foods list, meal planning/tracking handouts and plate model   Encouraged record keeping and physical activity as approved by physician    MONITORING/ EVALUATION/ PLAN:   Pt verbalized understanding of all materials covered   Pt asked pertinent questions throughout the session - expect compliance with nutrition guidelines presented   Provided pt with contact information should questions arise prior to next visit   Will f/u with pt monthly    Leidy Leiva, RD, LD

## 2025-06-17 ENCOUNTER — TELEPHONE (OUTPATIENT)
Dept: CARDIOLOGY CLINIC | Age: 75
End: 2025-06-17

## 2025-06-17 NOTE — TELEPHONE ENCOUNTER
5- OV Notes  Patient will be on Plavix and Eliquis  Plavix will be at least for 6 months after that he can go on aspirin  Will repeat an echocardiogram to see the improvement of the ejection fraction    Spoke with Michael at [facility] pharmacy for medication clarification

## 2025-06-18 ENCOUNTER — OFFICE VISIT (OUTPATIENT)
Dept: CARDIOLOGY CLINIC | Age: 75
End: 2025-06-18
Payer: MEDICARE

## 2025-06-18 VITALS
HEIGHT: 69 IN | SYSTOLIC BLOOD PRESSURE: 124 MMHG | DIASTOLIC BLOOD PRESSURE: 80 MMHG | WEIGHT: 309 LBS | BODY MASS INDEX: 45.77 KG/M2 | HEART RATE: 72 BPM

## 2025-06-18 DIAGNOSIS — R06.02 SHORTNESS OF BREATH: ICD-10-CM

## 2025-06-18 DIAGNOSIS — E78.2 MIXED HYPERLIPIDEMIA: Primary | ICD-10-CM

## 2025-06-18 PROCEDURE — 1124F ACP DISCUSS-NO DSCNMKR DOCD: CPT | Performed by: INTERNAL MEDICINE

## 2025-06-18 PROCEDURE — 1036F TOBACCO NON-USER: CPT | Performed by: INTERNAL MEDICINE

## 2025-06-18 PROCEDURE — 1159F MED LIST DOCD IN RCRD: CPT | Performed by: INTERNAL MEDICINE

## 2025-06-18 PROCEDURE — 3074F SYST BP LT 130 MM HG: CPT | Performed by: INTERNAL MEDICINE

## 2025-06-18 PROCEDURE — G8427 DOCREV CUR MEDS BY ELIG CLIN: HCPCS | Performed by: INTERNAL MEDICINE

## 2025-06-18 PROCEDURE — 3017F COLORECTAL CA SCREEN DOC REV: CPT | Performed by: INTERNAL MEDICINE

## 2025-06-18 PROCEDURE — 3079F DIAST BP 80-89 MM HG: CPT | Performed by: INTERNAL MEDICINE

## 2025-06-18 PROCEDURE — G8417 CALC BMI ABV UP PARAM F/U: HCPCS | Performed by: INTERNAL MEDICINE

## 2025-06-18 PROCEDURE — 99214 OFFICE O/P EST MOD 30 MIN: CPT | Performed by: INTERNAL MEDICINE

## 2025-06-18 NOTE — PROGRESS NOTES
Napoleon  is a  Established patient  ,74 y.o.   male here for evaluation of the following chief complaint(s):    DCM  Here for follow-up            SUBJECTIVE/OBJECTIVE:  HPI : h/o  DCM, a fib, ICD now here  for FU   Review of Systems    neg     Vitals       Vitals:     04/28/25 1353   BP: 134/86   BP Site: Left Upper Arm   Patient Position: Sitting   BP Cuff Size: Large Adult   Pulse: 72   Weight: (!) 143.8 kg (317 lb)   Height: 1.778 m (5' 10\")                  /86 (BP Site: Left Upper Arm, Patient Position: Sitting, BP Cuff Size: Large Adult)   Pulse 72   Ht 1.778 m (5' 10\")   Wt (!) 143.8 kg (317 lb)   BMI 45.48 kg/m²        7/27/2020     1:46 PM   Patient-Reported Vitals   Patient-Reported Weight 268 lbs   Patient-Reported Height 5' 10\"   Patient-Reported Systolic 122 mmHg   Patient-Reported Diastolic 78 mmHg   Patient-Reported Pulse 71   Patient-Reported Temperature 97.7          Wt Readings from Last 3 Encounters:   04/28/25 (!) 143.8 kg (317 lb)   03/26/25 (!) 142.7 kg (314 lb 9.6 oz)   03/19/25 (!) 146.9 kg (323 lb 12.8 oz)      Body mass index is 45.48 kg/m².     Physical Exam     Neck: JVD      Lungs : clear    Cardio : Si and S2 audilble      Ext: edema       All pertinent data reviewed       Meds : reviewed           Tests ordered    ICD check     ASSESSMENT/PLAN:                 -     CORONARY ARTERY DISEASE:  symptomatic     All available  tests in chart reviewed. Management discussed .  Testing ordered  no                               Underwent PTCA and stenting of the LAD  Will get a regular treadmill stress test and cardiac rehab  Will monitor the ejection fraction    5/16/2025  Patient underwent cardiac cath based on the reduction of the ejection fraction which showed critical LAD stenosis which was intervened upon  Patient will be on Plavix and Eliquis  Plavix will be at least for 6 months after that he can go on aspirin     - DCM  On meds    On GDMT  EF is better about 45 to 50% we

## 2025-06-18 NOTE — PROGRESS NOTES
CLINICAL STAFF DOCUMENTATION    Dr. Mihir Mcneil  1950  [id number]    Have you had any Chest Pain recently? - No        Have you had any Shortness of Breath - No      Have you had any dizziness - No    Have you had any palpitations recently? - No      Do you have any edema - swelling in No        Is the patient on any of the following medications - Dofetilide (Tikosyn)  If Yes DO EKG - Needs done every 3 months  last EKG 5/2025    When did you have your last labs drawn no lipid       If we do not have these labs, you are retrieve these labs for the provider!    Do you need any prescriptions refilled? - No    Do you have a surgery or procedure scheduled in the near future - No      Do use tobacco products? - No  Do you drink alcohol? - occasionally   Do you use any illicit drugs? - No  Caffeine? - Yes  How much caffeine? .Coffee in the morning 1 cup         Check medication list thoroughly!!! AND RECONCILE OUTSIDE MEDICATIONS  If dose has changed change the entire order not just the MG  BE SURE TO ASK PATIENT IF THEY NEED MEDICATION REFILLS  Verify Pharmacy and update if incorrect    Add to every patient's \"wrap up\" the following dot phrase AFTERVISITCARDIOHEARTHOUSE and ensure we explain this to our patients

## 2025-06-25 ENCOUNTER — TELEPHONE (OUTPATIENT)
Dept: CARDIOLOGY CLINIC | Age: 75
End: 2025-06-25

## 2025-06-25 ENCOUNTER — OFFICE VISIT (OUTPATIENT)
Dept: BARIATRICS/WEIGHT MGMT | Age: 75
End: 2025-06-25

## 2025-06-25 ENCOUNTER — HOSPITAL ENCOUNTER (OUTPATIENT)
Dept: CARDIAC REHAB | Age: 75
Setting detail: THERAPIES SERIES
Discharge: HOME | End: 2025-06-25
Payer: MEDICARE

## 2025-06-25 VITALS — BODY MASS INDEX: 45.08 KG/M2 | WEIGHT: 304.4 LBS | HEIGHT: 69 IN

## 2025-06-25 DIAGNOSIS — E66.01 MORBID OBESITY WITH BMI OF 40.0-44.9, ADULT (HCC): Primary | ICD-10-CM

## 2025-06-25 PROCEDURE — G0423 INTENS CARDIAC REHAB NO EXER: HCPCS

## 2025-06-25 PROCEDURE — G0422 INTENS CARDIAC REHAB W/EXERC: HCPCS

## 2025-06-25 NOTE — PROGRESS NOTES
Outpatient Nutrition Counseling - Non-Surgical Weight Loss Program    REASON FOR VISIT: Weigh In    Chief Complaint:    Chief Complaint   Patient presents with    Weight Management       SUBJECTIVE:  The patient is a 75 y.o. male being seen for obesity, enrolled in Non-Surgical Weight Loss Program; Napoleon's, Height: 175.3 cm (5' 9\"), Weight - Scale: (!) 138.1 kg (304 lb 6.4 oz), Current Body mass index is 44.95 kg/m².  patient's PCP is Jama Richards MD     Comorbid Conditions:  Significant diseases affecting this patient are   Past Medical History:   Diagnosis Date    Atrial fibrillation (HCC)     Cardiac pacemaker 02/19/2014    BIV ICD Serial # BL [id number] Moderl # [id number]    Cardiomyopathy (HCC)     COPD (chronic obstructive pulmonary disease) (HCC)     Sees Dr. 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     History of echocardiogram 04/01/2019    History of left heart catheterization (LHC) 05/02/2025    Left main is a large tubular vessel has no significant stenosis The left and descending artery has a 99% stenosis in its proximal segment it appears to be diffusely diseased vessel was reduced to 0% angioplasty and stenting we dilated the proximal end of the stent with a 3.5 balloon taken dimensions up to 3.8 Circumflex vessel has diffuse disease does not appear to have hemodynamic...SEE EPIC    History of transesophageal echocardiography (KVNG) for monitoring 12/04/2013 12/13 no clots    Hyperlipidemia     Hypertelorism     Hypertension     Obesity     S/P ablation of atrial fibrillation 02/14/2014    for a-fib by

## 2025-06-30 ENCOUNTER — HOSPITAL ENCOUNTER (OUTPATIENT)
Dept: CARDIAC REHAB | Age: 75
Setting detail: THERAPIES SERIES
Discharge: HOME | End: 2025-06-30
Payer: MEDICARE

## 2025-06-30 PROCEDURE — G0422 INTENS CARDIAC REHAB W/EXERC: HCPCS

## 2025-07-01 ENCOUNTER — HOSPITAL ENCOUNTER (OUTPATIENT)
Dept: CARDIAC REHAB | Age: 75
Setting detail: THERAPIES SERIES
Discharge: HOME | End: 2025-07-01
Payer: MEDICARE

## 2025-07-01 PROCEDURE — G0422 INTENS CARDIAC REHAB W/EXERC: HCPCS

## 2025-07-02 ENCOUNTER — TELEPHONE (OUTPATIENT)
Dept: CARDIOLOGY CLINIC | Age: 75
End: 2025-07-02

## 2025-07-02 NOTE — TELEPHONE ENCOUNTER
Spoke with patient and per his request, rescheduled office visit follow up with Charity Sharma NP to 8/8/25 @ 300pm.    Sooner dates/times were offered but not available for Patient. Patient advised understanding.

## 2025-07-07 ENCOUNTER — HOSPITAL ENCOUNTER (OUTPATIENT)
Dept: CARDIAC REHAB | Age: 75
Setting detail: THERAPIES SERIES
Discharge: HOME | End: 2025-07-07
Payer: MEDICARE

## 2025-07-07 PROCEDURE — G0422 INTENS CARDIAC REHAB W/EXERC: HCPCS

## 2025-07-08 ENCOUNTER — HOSPITAL ENCOUNTER (OUTPATIENT)
Dept: CARDIAC REHAB | Age: 75
Setting detail: THERAPIES SERIES
Discharge: HOME | End: 2025-07-08
Payer: MEDICARE

## 2025-07-08 PROCEDURE — G0422 INTENS CARDIAC REHAB W/EXERC: HCPCS

## 2025-07-14 ENCOUNTER — HOSPITAL ENCOUNTER (OUTPATIENT)
Dept: CARDIAC REHAB | Age: 75
Setting detail: THERAPIES SERIES
Discharge: HOME | End: 2025-07-14
Payer: MEDICARE

## 2025-07-14 PROCEDURE — G0422 INTENS CARDIAC REHAB W/EXERC: HCPCS

## 2025-07-15 ENCOUNTER — HOSPITAL ENCOUNTER (OUTPATIENT)
Dept: CARDIAC REHAB | Age: 75
Setting detail: THERAPIES SERIES
Discharge: HOME | End: 2025-07-15
Payer: MEDICARE

## 2025-07-15 PROCEDURE — G0422 INTENS CARDIAC REHAB W/EXERC: HCPCS

## 2025-07-28 ENCOUNTER — HOSPITAL ENCOUNTER (OUTPATIENT)
Dept: CARDIAC REHAB | Age: 75
Setting detail: THERAPIES SERIES
Discharge: HOME | End: 2025-07-28
Payer: MEDICARE

## 2025-07-28 PROCEDURE — G0422 INTENS CARDIAC REHAB W/EXERC: HCPCS

## 2025-07-29 ENCOUNTER — HOSPITAL ENCOUNTER (OUTPATIENT)
Dept: CARDIAC REHAB | Age: 75
Setting detail: THERAPIES SERIES
Discharge: HOME | End: 2025-07-29
Payer: MEDICARE

## 2025-07-29 PROCEDURE — G0422 INTENS CARDIAC REHAB W/EXERC: HCPCS

## 2025-07-30 ENCOUNTER — OFFICE VISIT (OUTPATIENT)
Dept: BARIATRICS/WEIGHT MGMT | Age: 75
End: 2025-07-30

## 2025-07-30 VITALS — HEIGHT: 69 IN | BODY MASS INDEX: 44.46 KG/M2 | WEIGHT: 300.2 LBS

## 2025-07-30 DIAGNOSIS — E66.01 MORBID OBESITY WITH BMI OF 40.0-44.9, ADULT (HCC): Primary | ICD-10-CM

## 2025-07-30 NOTE — PROGRESS NOTES
Outpatient Nutrition Counseling - Non-Surgical Weight Loss Program    REASON FOR VISIT: Weigh In    Chief Complaint:    Chief Complaint   Patient presents with    Weight Management       SUBJECTIVE:  The patient is a 75 y.o. male being seen for obesity, enrolled in Non-Surgical Weight Loss Program; Napoleon's, Height: 175.9 cm (5' 9.25\"), Weight - Scale: (!) 136.2 kg (300 lb 3.2 oz), Current Body mass index is 44.01 kg/m².  patient's PCP is Jama Richards MD     Comorbid Conditions:  Significant diseases affecting this patient are   Past Medical History:   Diagnosis Date    Atrial fibrillation (HCC)     Cardiac pacemaker 02/19/2014    BIV ICD Serial # BL [id number] Moderl # [id number]    Cardiomyopathy (HCC)     COPD (chronic obstructive pulmonary disease) (HCC)     Sees Dr. 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     History of echocardiogram 04/01/2019    History of left heart catheterization (LHC) 05/02/2025    Left main is a large tubular vessel has no significant stenosis The left and descending artery has a 99% stenosis in its proximal segment it appears to be diffusely diseased vessel was reduced to 0% angioplasty and stenting we dilated the proximal end of the stent with a 3.5 balloon taken dimensions up to 3.8 Circumflex vessel has diffuse disease does not appear to have hemodynamic...SEE EPIC    History of transesophageal echocardiography (KVNG) for monitoring 12/04/2013 12/13 no clots    Hyperlipidemia     Hypertelorism     Hypertension     Obesity     S/P ablation of atrial fibrillation 02/14/2014    for a-fib

## 2025-07-31 ENCOUNTER — APPOINTMENT (OUTPATIENT)
Dept: CARDIAC REHAB | Age: 75
End: 2025-07-31
Payer: MEDICARE

## 2025-08-04 ENCOUNTER — HOSPITAL ENCOUNTER (OUTPATIENT)
Dept: CARDIAC REHAB | Age: 75
Setting detail: THERAPIES SERIES
Discharge: HOME | End: 2025-08-04
Payer: MEDICARE

## 2025-08-04 PROCEDURE — G0422 INTENS CARDIAC REHAB W/EXERC: HCPCS

## 2025-08-05 ENCOUNTER — HOSPITAL ENCOUNTER (OUTPATIENT)
Dept: CARDIAC REHAB | Age: 75
Setting detail: THERAPIES SERIES
Discharge: HOME | End: 2025-08-05
Payer: MEDICARE

## 2025-08-05 PROCEDURE — G0422 INTENS CARDIAC REHAB W/EXERC: HCPCS

## 2025-08-11 ENCOUNTER — HOSPITAL ENCOUNTER (OUTPATIENT)
Dept: CARDIAC REHAB | Age: 75
Setting detail: THERAPIES SERIES
Discharge: HOME | End: 2025-08-11
Payer: MEDICARE

## 2025-08-11 PROCEDURE — G0422 INTENS CARDIAC REHAB W/EXERC: HCPCS

## 2025-08-12 ENCOUNTER — HOSPITAL ENCOUNTER (OUTPATIENT)
Dept: CARDIAC REHAB | Age: 75
Setting detail: THERAPIES SERIES
Discharge: HOME | End: 2025-08-12
Payer: MEDICARE

## 2025-08-12 PROCEDURE — G0422 INTENS CARDIAC REHAB W/EXERC: HCPCS

## 2025-08-13 ENCOUNTER — OFFICE VISIT (OUTPATIENT)
Age: 75
End: 2025-08-13

## 2025-08-13 VITALS
HEART RATE: 74 BPM | SYSTOLIC BLOOD PRESSURE: 132 MMHG | BODY MASS INDEX: 44.91 KG/M2 | DIASTOLIC BLOOD PRESSURE: 74 MMHG | WEIGHT: 303.2 LBS | HEIGHT: 69 IN

## 2025-08-13 DIAGNOSIS — Z79.899 VISIT FOR MONITORING TIKOSYN THERAPY: Primary | ICD-10-CM

## 2025-08-13 DIAGNOSIS — D68.69 HYPERCOAGULABLE STATE DUE TO PAROXYSMAL ATRIAL FIBRILLATION (HCC): ICD-10-CM

## 2025-08-13 DIAGNOSIS — I48.0 HYPERCOAGULABLE STATE DUE TO PAROXYSMAL ATRIAL FIBRILLATION (HCC): ICD-10-CM

## 2025-08-13 DIAGNOSIS — Z51.81 VISIT FOR MONITORING TIKOSYN THERAPY: Primary | ICD-10-CM

## 2025-08-13 DIAGNOSIS — I10 PRIMARY HYPERTENSION: ICD-10-CM

## 2025-08-13 DIAGNOSIS — Z95.810 ICD (IMPLANTABLE CARDIOVERTER-DEFIBRILLATOR), BIVENTRICULAR, IN SITU: ICD-10-CM

## 2025-08-13 DIAGNOSIS — Z98.890 S/P ABLATION OF ATRIAL FLUTTER: ICD-10-CM

## 2025-08-13 DIAGNOSIS — Z98.890 S/P ABLATION OF ATRIAL FIBRILLATION: ICD-10-CM

## 2025-08-13 DIAGNOSIS — Z86.79 S/P ABLATION OF ATRIAL FIBRILLATION: ICD-10-CM

## 2025-08-13 DIAGNOSIS — Z86.79 S/P ABLATION OF ATRIAL FLUTTER: ICD-10-CM

## 2025-08-18 ENCOUNTER — HOSPITAL ENCOUNTER (OUTPATIENT)
Dept: CARDIAC REHAB | Age: 75
Setting detail: THERAPIES SERIES
Discharge: HOME | End: 2025-08-18
Payer: MEDICARE

## 2025-08-18 PROBLEM — D68.69 HYPERCOAGULABLE STATE DUE TO PAROXYSMAL ATRIAL FIBRILLATION (HCC): Status: ACTIVE | Noted: 2025-08-18

## 2025-08-18 PROBLEM — I48.0 HYPERCOAGULABLE STATE DUE TO PAROXYSMAL ATRIAL FIBRILLATION (HCC): Status: ACTIVE | Noted: 2025-08-18

## 2025-08-18 PROCEDURE — G0422 INTENS CARDIAC REHAB W/EXERC: HCPCS

## 2025-08-19 ENCOUNTER — HOSPITAL ENCOUNTER (OUTPATIENT)
Dept: CARDIAC REHAB | Age: 75
Setting detail: THERAPIES SERIES
Discharge: HOME | End: 2025-08-19
Payer: MEDICARE

## 2025-08-19 PROCEDURE — G0422 INTENS CARDIAC REHAB W/EXERC: HCPCS

## 2025-08-21 DIAGNOSIS — Z95.810 CARDIAC DEFIBRILLATOR IN PLACE: Primary | ICD-10-CM

## 2025-08-25 ENCOUNTER — HOSPITAL ENCOUNTER (OUTPATIENT)
Dept: CARDIAC REHAB | Age: 75
Setting detail: THERAPIES SERIES
Discharge: HOME | End: 2025-08-25
Payer: MEDICARE

## 2025-08-25 PROCEDURE — G0422 INTENS CARDIAC REHAB W/EXERC: HCPCS

## 2025-08-26 ENCOUNTER — HOSPITAL ENCOUNTER (OUTPATIENT)
Dept: CARDIAC REHAB | Age: 75
Setting detail: THERAPIES SERIES
Discharge: HOME | End: 2025-08-26
Payer: MEDICARE

## 2025-08-26 PROCEDURE — G0422 INTENS CARDIAC REHAB W/EXERC: HCPCS

## 2025-08-27 ENCOUNTER — OFFICE VISIT (OUTPATIENT)
Dept: BARIATRICS/WEIGHT MGMT | Age: 75
End: 2025-08-27

## 2025-08-27 VITALS — WEIGHT: 294.4 LBS | BODY MASS INDEX: 43.6 KG/M2 | HEIGHT: 69 IN

## 2025-08-27 DIAGNOSIS — E66.01 MORBID OBESITY WITH BMI OF 40.0-44.9, ADULT (HCC): Primary | ICD-10-CM

## 2025-09-02 ENCOUNTER — OFFICE VISIT (OUTPATIENT)
Dept: PULMONOLOGY | Age: 75
End: 2025-09-02

## 2025-09-02 ENCOUNTER — HOSPITAL ENCOUNTER (OUTPATIENT)
Dept: CARDIAC REHAB | Age: 75
Setting detail: THERAPIES SERIES
Discharge: HOME | End: 2025-09-02
Payer: MEDICARE

## 2025-09-02 VITALS
HEART RATE: 81 BPM | OXYGEN SATURATION: 96 % | BODY MASS INDEX: 42.09 KG/M2 | DIASTOLIC BLOOD PRESSURE: 70 MMHG | HEIGHT: 70 IN | SYSTOLIC BLOOD PRESSURE: 126 MMHG | WEIGHT: 294 LBS

## 2025-09-02 DIAGNOSIS — G47.33 OBSTRUCTIVE SLEEP APNEA: Primary | ICD-10-CM

## 2025-09-02 DIAGNOSIS — I27.20 MILD PULMONARY HYPERTENSION (HCC): ICD-10-CM

## 2025-09-02 DIAGNOSIS — J41.0 SIMPLE CHRONIC BRONCHITIS (HCC): ICD-10-CM

## 2025-09-02 PROCEDURE — G0422 INTENS CARDIAC REHAB W/EXERC: HCPCS

## (undated) DEVICE — GLIDESHEATH SLENDER ACCESS KIT: Brand: GLIDESHEATH SLENDER

## (undated) DEVICE — CATH BLLN ANGIO 2X15MM SC EUPHORA RX

## (undated) DEVICE — ANGIOGRAPHY KIT CUST MANIFOLD

## (undated) DEVICE — GUIDEWIRE WITH ICE™ HYDROPHILIC COATING: Brand: LUGE™

## (undated) DEVICE — BAND COMPR L24CM REG CLR PLAS HEMSTAT EXT HK AND LOOP RETEN

## (undated) DEVICE — GUIDEWIRE VASC L260CM DIA0.035IN RAD 3MM J TIP L7CM PTFE

## (undated) DEVICE — CATH BLLN ANGIO 2.75X12MM NC EUPHORIA RX

## (undated) DEVICE — CATHETER GUID 6FR L150CM RAP EXCHG L25CM TIP 5.1FR PUSHROD

## (undated) DEVICE — CATH BLLN ANGIO 3.50X12MM SC EUPHORA RX

## (undated) DEVICE — CATH BLLN ANGIO 3X20MM NC EUPHORIA RX

## (undated) DEVICE — RADIFOCUS OPTITORQUE ANGIOGRAPHIC CATHETER: Brand: OPTITORQUE

## (undated) DEVICE — NEEDLE ANGIO L1IN DIA21GA 1 THN WALL SMOOTH STD HUB

## (undated) DEVICE — INFLATION DEVICE: Brand: ENCORE™ 26

## (undated) DEVICE — DEVICE INFLATION KT 60031246] ANGIODYNAMICS INC]

## (undated) DEVICE — CATHETER GUID XB 3.5 6 FRX100 CM VISTA BRT TIP

## (undated) DEVICE — Device